# Patient Record
Sex: MALE | Race: WHITE | Employment: UNEMPLOYED | URBAN - METROPOLITAN AREA
[De-identification: names, ages, dates, MRNs, and addresses within clinical notes are randomized per-mention and may not be internally consistent; named-entity substitution may affect disease eponyms.]

---

## 2020-01-22 ENCOUNTER — HOSPITAL ENCOUNTER (INPATIENT)
Age: 75
LOS: 12 days | Discharge: HOME HEALTH | DRG: 233 | End: 2020-02-03
Attending: INTERNAL MEDICINE | Admitting: THORACIC SURGERY (CARDIOTHORACIC VASCULAR SURGERY)
Payer: MEDICARE

## 2020-01-22 ENCOUNTER — HOSPITAL ENCOUNTER (EMERGENCY)
Dept: HOSPITAL 83 - ED | Age: 75
Discharge: ANOTHER HOSPITAL | End: 2020-01-22
Payer: COMMERCIAL

## 2020-01-22 VITALS — WEIGHT: 191 LBS | BODY MASS INDEX: 29.98 KG/M2 | HEIGHT: 66.97 IN

## 2020-01-22 DIAGNOSIS — Z88.7: ICD-10-CM

## 2020-01-22 DIAGNOSIS — I21.9: Primary | ICD-10-CM

## 2020-01-22 DIAGNOSIS — K30: ICD-10-CM

## 2020-01-22 PROBLEM — I25.10 CAD IN NATIVE ARTERY: Status: ACTIVE | Noted: 2020-01-22

## 2020-01-22 LAB
ABO/RH: NORMAL
ALBUMIN SERPL-MCNC: 4.1 GM/DL (ref 3.1–4.5)
ALP SERPL-CCNC: 78 U/L (ref 45–117)
ALT SERPL W P-5'-P-CCNC: 35 U/L (ref 12–78)
ANTIBODY SCREEN: NORMAL
APTT PPP: 24.4 SECONDS (ref 20–32.1)
AST SERPL-CCNC: 35 IU/L (ref 3–35)
BASOPHILS # BLD AUTO: 0.1 10*3/UL (ref 0–0.1)
BASOPHILS NFR BLD AUTO: 0.5 % (ref 0–1)
BUN SERPL-MCNC: 11 MG/DL (ref 7–24)
CHLORIDE SERPL-SCNC: 103 MMOL/L (ref 98–107)
CREAT SERPL-MCNC: 1 MG/DL (ref 0.7–1.3)
EOSINOPHIL # BLD AUTO: 0.1 10*3/UL (ref 0–0.4)
EOSINOPHIL # BLD AUTO: 0.5 % (ref 1–4)
ERYTHROCYTE [DISTWIDTH] IN BLOOD BY AUTOMATED COUNT: 12.9 % (ref 0–14.5)
HCT VFR BLD AUTO: 56.5 % (ref 42–52)
HGB BLD-MCNC: 18.6 G/DL (ref 14–18)
INR BLD: 0.9 (ref 2–3.5)
LV EF: 20 %
LVEF MODALITY: NORMAL
LYMPHOCYTES # BLD AUTO: 1.7 10*3/UL (ref 1.3–4.4)
LYMPHOCYTES NFR BLD AUTO: 9.6 % (ref 27–41)
MCH RBC QN AUTO: 29 PG (ref 27–31)
MCHC RBC AUTO-ENTMCNC: 32.9 G/DL (ref 33–37)
MCV RBC AUTO: 88.1 FL (ref 80–94)
MONOCYTES # BLD AUTO: 0.8 10*3/UL (ref 0.1–1)
MONOCYTES NFR BLD MANUAL: 4.3 % (ref 3–9)
NEUT #: 14.9 10*3/UL (ref 2.3–7.9)
NEUT %: 84.4 % (ref 47–73)
NRBC BLD QL AUTO: 0 % (ref 0–0)
PLATELET # BLD AUTO: 363 10*3/UL (ref 130–400)
PMV BLD AUTO: 10.6 FL (ref 9.6–12.3)
POTASSIUM SERPL-SCNC: 4.6 MMOL/L (ref 3.5–5.1)
PROT SERPL-MCNC: 7.9 GM/DL (ref 6.4–8.2)
RBC # BLD AUTO: 6.41 10*6/UL (ref 4.5–5.9)
SODIUM SERPL-SCNC: 138 MMOL/L (ref 136–145)
TROPONIN I SERPL-MCNC: 1.42 NG/ML (ref ?–0.04)
WBC NRBC COR # BLD AUTO: 17.7 10*3/UL (ref 4.8–10.8)

## 2020-01-22 PROCEDURE — 2500000003 HC RX 250 WO HCPCS: Performed by: INTERNAL MEDICINE

## 2020-01-22 PROCEDURE — 93458 L HRT ARTERY/VENTRICLE ANGIO: CPT | Performed by: INTERNAL MEDICINE

## 2020-01-22 PROCEDURE — 2500000003 HC RX 250 WO HCPCS

## 2020-01-22 PROCEDURE — 33967 INSERT I-AORT PERCUT DEVICE: CPT | Performed by: INTERNAL MEDICINE

## 2020-01-22 PROCEDURE — 2580000003 HC RX 258: Performed by: INTERNAL MEDICINE

## 2020-01-22 PROCEDURE — 86850 RBC ANTIBODY SCREEN: CPT

## 2020-01-22 PROCEDURE — 6360000002 HC RX W HCPCS: Performed by: INTERNAL MEDICINE

## 2020-01-22 PROCEDURE — 4A023N7 MEASUREMENT OF CARDIAC SAMPLING AND PRESSURE, LEFT HEART, PERCUTANEOUS APPROACH: ICD-10-PCS | Performed by: INTERNAL MEDICINE

## 2020-01-22 PROCEDURE — C1894 INTRO/SHEATH, NON-LASER: HCPCS

## 2020-01-22 PROCEDURE — 6370000000 HC RX 637 (ALT 250 FOR IP): Performed by: INTERNAL MEDICINE

## 2020-01-22 PROCEDURE — 86900 BLOOD TYPING SEROLOGIC ABO: CPT

## 2020-01-22 PROCEDURE — 5A02210 ASSISTANCE WITH CARDIAC OUTPUT USING BALLOON PUMP, CONTINUOUS: ICD-10-PCS | Performed by: INTERNAL MEDICINE

## 2020-01-22 PROCEDURE — 99222 1ST HOSP IP/OBS MODERATE 55: CPT | Performed by: INTERNAL MEDICINE

## 2020-01-22 PROCEDURE — C1769 GUIDE WIRE: HCPCS

## 2020-01-22 PROCEDURE — B2151ZZ FLUOROSCOPY OF LEFT HEART USING LOW OSMOLAR CONTRAST: ICD-10-PCS | Performed by: INTERNAL MEDICINE

## 2020-01-22 PROCEDURE — 86923 COMPATIBILITY TEST ELECTRIC: CPT

## 2020-01-22 PROCEDURE — 93005 ELECTROCARDIOGRAM TRACING: CPT | Performed by: INTERNAL MEDICINE

## 2020-01-22 PROCEDURE — 2709999900 HC NON-CHARGEABLE SUPPLY

## 2020-01-22 PROCEDURE — 86901 BLOOD TYPING SEROLOGIC RH(D): CPT

## 2020-01-22 PROCEDURE — 6360000002 HC RX W HCPCS

## 2020-01-22 PROCEDURE — C1725 CATH, TRANSLUMIN NON-LASER: HCPCS

## 2020-01-22 PROCEDURE — 2000000000 HC ICU R&B

## 2020-01-22 PROCEDURE — B2111ZZ FLUOROSCOPY OF MULTIPLE CORONARY ARTERIES USING LOW OSMOLAR CONTRAST: ICD-10-PCS | Performed by: INTERNAL MEDICINE

## 2020-01-22 RX ORDER — SODIUM CHLORIDE 9 MG/ML
INJECTION, SOLUTION INTRAVENOUS CONTINUOUS
Status: DISCONTINUED | OUTPATIENT
Start: 2020-01-22 | End: 2020-01-22

## 2020-01-22 RX ORDER — ACETAMINOPHEN 325 MG/1
650 TABLET ORAL EVERY 4 HOURS PRN
Status: DISCONTINUED | OUTPATIENT
Start: 2020-01-22 | End: 2020-01-22 | Stop reason: SDUPTHER

## 2020-01-22 RX ORDER — NITROGLYCERIN 20 MG/100ML
10 INJECTION INTRAVENOUS CONTINUOUS
Status: DISCONTINUED | OUTPATIENT
Start: 2020-01-22 | End: 2020-01-22

## 2020-01-22 RX ORDER — ZOLPIDEM TARTRATE 5 MG/1
5 TABLET ORAL NIGHTLY PRN
Status: DISCONTINUED | OUTPATIENT
Start: 2020-01-22 | End: 2020-01-24

## 2020-01-22 RX ORDER — CALCIUM CARBONATE 200(500)MG
500 TABLET,CHEWABLE ORAL 3 TIMES DAILY PRN
Status: DISCONTINUED | OUTPATIENT
Start: 2020-01-22 | End: 2020-01-24

## 2020-01-22 RX ORDER — ATORVASTATIN CALCIUM 40 MG/1
80 TABLET, FILM COATED ORAL NIGHTLY
Status: DISCONTINUED | OUTPATIENT
Start: 2020-01-22 | End: 2020-02-03 | Stop reason: HOSPADM

## 2020-01-22 RX ORDER — ONDANSETRON 2 MG/ML
4 INJECTION INTRAMUSCULAR; INTRAVENOUS EVERY 6 HOURS PRN
Status: DISCONTINUED | OUTPATIENT
Start: 2020-01-22 | End: 2020-01-24

## 2020-01-22 RX ORDER — FUROSEMIDE 10 MG/ML
20 INJECTION INTRAMUSCULAR; INTRAVENOUS ONCE
Status: COMPLETED | OUTPATIENT
Start: 2020-01-22 | End: 2020-01-22

## 2020-01-22 RX ORDER — CARVEDILOL 6.25 MG/1
6.25 TABLET ORAL 2 TIMES DAILY WITH MEALS
Status: DISCONTINUED | OUTPATIENT
Start: 2020-01-22 | End: 2020-01-24

## 2020-01-22 RX ORDER — SODIUM CHLORIDE 0.9 % (FLUSH) 0.9 %
10 SYRINGE (ML) INJECTION PRN
Status: DISCONTINUED | OUTPATIENT
Start: 2020-01-22 | End: 2020-01-23 | Stop reason: SDUPTHER

## 2020-01-22 RX ORDER — SODIUM CHLORIDE 0.9 % (FLUSH) 0.9 %
10 SYRINGE (ML) INJECTION EVERY 12 HOURS SCHEDULED
Status: DISCONTINUED | OUTPATIENT
Start: 2020-01-22 | End: 2020-01-23 | Stop reason: SDUPTHER

## 2020-01-22 RX ORDER — PANTOPRAZOLE SODIUM 40 MG/1
40 TABLET, DELAYED RELEASE ORAL
Status: DISCONTINUED | OUTPATIENT
Start: 2020-01-23 | End: 2020-01-24

## 2020-01-22 RX ORDER — ACETAMINOPHEN 325 MG/1
650 TABLET ORAL EVERY 4 HOURS PRN
Status: DISCONTINUED | OUTPATIENT
Start: 2020-01-22 | End: 2020-01-24

## 2020-01-22 RX ORDER — ASPIRIN 81 MG/1
81 TABLET ORAL DAILY
Status: DISCONTINUED | OUTPATIENT
Start: 2020-01-22 | End: 2020-01-24

## 2020-01-22 RX ORDER — CHOLECALCIFEROL (VITAMIN D3) 125 MCG
500 CAPSULE ORAL DAILY
COMMUNITY

## 2020-01-22 RX ORDER — SPIRONOLACTONE 25 MG/1
25 TABLET ORAL DAILY
Status: DISCONTINUED | OUTPATIENT
Start: 2020-01-22 | End: 2020-01-24

## 2020-01-22 RX ORDER — MULTIVIT-MIN/IRON/FOLIC ACID/K 18-600-40
CAPSULE ORAL
COMMUNITY

## 2020-01-22 RX ADMIN — ATORVASTATIN CALCIUM 80 MG: 40 TABLET, FILM COATED ORAL at 21:39

## 2020-01-22 RX ADMIN — FUROSEMIDE 20 MG: 20 INJECTION, SOLUTION INTRAMUSCULAR; INTRAVENOUS at 21:40

## 2020-01-22 RX ADMIN — SODIUM CHLORIDE: 9 INJECTION, SOLUTION INTRAVENOUS at 12:47

## 2020-01-22 RX ADMIN — CARVEDILOL 6.25 MG: 6.25 TABLET, FILM COATED ORAL at 21:41

## 2020-01-22 RX ADMIN — SPIRONOLACTONE 25 MG: 25 TABLET ORAL at 19:22

## 2020-01-22 RX ADMIN — SODIUM CHLORIDE: 9 INJECTION, SOLUTION INTRAVENOUS at 12:48

## 2020-01-22 RX ADMIN — Medication 10 ML: at 21:40

## 2020-01-22 RX ADMIN — NITROGLYCERIN 10 MCG/MIN: 20 INJECTION INTRAVENOUS at 18:30

## 2020-01-22 RX ADMIN — ASPIRIN 81 MG: 81 TABLET, COATED ORAL at 21:41

## 2020-01-22 ASSESSMENT — PAIN SCALES - GENERAL: PAINLEVEL_OUTOF10: 0

## 2020-01-22 NOTE — H&P
then the rest of the mid segment of the   RCA is mildly diffusely diseased  5. Left ventriculography showed estimated ejection fraction of   20%, with apical dyskinesis, global wall hypokinesis, plus for   angiographic mitral valve regurgitation with enlarged left atrium  6. LVEDP of 27 and there was no gradient across aortic valve on   pullback. He is now being evaluated for cardiac bypass grafting  Past Medical History:          Diagnosis Date    CAD in native artery 1/22/2020    Hernia, umbilical     MI ([facility] infarction) [facility])        Past Surgical History:          Procedure Laterality Date    HERNIA REPAIR         Medications Prior to Admission:      Prior to Admission medications    Medication Sig Start Date End Date Taking? Authorizing Provider   vitamin B-12 (CYANOCOBALAMIN) 500 MCG tablet Take 500 mcg by mouth daily   Yes Historical Provider, MD   Cholecalciferol (VITAMIN D) 50 MCG (2000 UT) CAPS capsule Take by mouth   Yes Historical Provider, MD       Allergies:  Tetanus toxoids    Social History:      The patient currently lives with his wife and [facility]    TOBACCO:   reports that he quit smoking about 15 years ago. His smokeless tobacco use includes chew. ETOH:   has no history on file for alcohol. Family History:      Reviewed in detail and negative for DM, CAD, Cancer, CVA. Positive as follows:    History reviewed. No pertinent family history. REVIEW OF SYSTEMS:   Pertinent positives as noted in the HPI. All other systems reviewed and negative. PHYSICAL EXAM:    /77   Pulse 85   Temp 97.7 °F (36.5 °C) (Temporal)   Resp 20   Ht 5' 7\" (1.702 m)   Wt 173 lb (78.5 kg)   SpO2 98%   BMI 27.10 kg/m²     General appearance:  No apparent distress, appears stated age and cooperative. HEENT:  Normal cephalic, atraumatic without obvious deformity. Pupils equal, round, and reactive to light. Extra ocular muscles intact. Conjunctivae/corneas clear.   Neck: Supple, with full

## 2020-01-22 NOTE — PROGRESS NOTES
SOPHIA Suero Notified of CTS consult via perfect serve. Joi Ponce notified of admisstion via perfect serve.

## 2020-01-22 NOTE — PLAN OF CARE
Problem: Risk for Impaired Skin Integrity  Goal: Tissue integrity - skin and mucous membranes  Description  Structural intactness and normal physiological function of skin and  mucous membranes.   Outcome: Met This Shift  Note:   Reposition pt as oumou pt in reverse trendelenberg iabp intact to right groin     Problem: Cardiac Output - Decreased:  Goal: Hemodynamic stability will improve  Description  Hemodynamic stability will improve  Outcome: Met This Shift  Note:   Monitor vital sgns and monitor chest pain monitor iabp adm medications as ordered

## 2020-01-22 NOTE — PROCEDURES
PROVIDER:     Stacy Villa MD     DATE OF PROCEDURE:    1/22/2020     PROCEDURES PERFORMED:  1. Coronary angiography. 2.   Left heart catheterization  3. Left ventriculography  4. Insertion of intra-aortic balloon pump  5. Conscious sedation using Versed and fentanyl. INDICATION:    1.  Non-ST elevation MI  2. AUC score  for dianostic procedure is 8, AUC indication  for diagnostic procedure is 4    Patient 67years old male no significant past medical history, has been having chest pain described as indigestion for the last few weeks, pain got worse over the last 3 days, last night pain was significant enough that prompted him to seek medical attention, patient was found to have elevated troponin and continued to have chest pain, patient was transferred from [facility] to [facility] for urgent cardiac catheterization. DESCRIPTION OF PROCEDURE:  After the appropriate informed consent, the  right wrist area was prepped and draped in the usual sterile fashion. timeout was completed. Carol Mallet test was normal  The right wrist area was locally anesthetized with 3 mL of 2% lidocaine. A 21-gauge needle was used to access the right  radial artery. 6-Slovak introducer sheath was used to cannulate the right radial artery. 6-Slovak JL3.5 and 6-Slovak JR4 catheters were used for coronary angiography in multiple projections. A 6 Slovak pigtail catheter was used for left heart catheterization and left ventriculography in the CORDOVA 30 projection. At the end of the procedure the catheter and the wire were pulled out from the body, a VASC band was applied to the right wrist area with effective hemostasis.     After completing the coronary angiography and since patient continued to have mild chest pain, even after giving him IV metoprolol and start him on IV nitroglycerin, and due to the severity of his CAD and poor LV function, it was decided to insert intra-aortic balloon pump for regurgitation with enlarged left atrium  6. LVEDP of 27 and there was no gradient across aortic valve on pullback. COMPLICATIONS:  None. ESTIMATED TOTAL BLOOD LOSS:   20-30 mL. MEDICATIONS USED DURING THE PROCEDURE:  We used intraarterial heparin  for anticoagulation for the diagnostic procedure. We used intraarterial verapamil to prevent vasospasm. CONSCIOUS SEDATION:  We used Versed and fentanyl for conscious sedation. First dose was given at  1534. Procedure ended at  1630    . There were  56 minutes of direct face-to-face supervision during conscious sedation  Administration. Total fluoroscopy time was  7  minutes. Total contrast used was  100  mL. IMPRESSION:  1. Severe multivessel disease  2. Severe cardiomyopathy  3. Severe angiographic mitral valve regurgitation (the position of the pigtail catheter might have worsening the severity of the mitral valve regurgitation)  4. Elevated LVEDP  5. Intra-aortic balloon pump insertion for chest pain not responsive to medical therapy,       RECOMMENDATIONS:  1. Continue current treatment  2. CT surgery consultation for evaluation for possible CABG  3.    Echocardiogram  4.   Patient will be transferred to CVICU for further evaluation and management       Nieves Pacheco MD

## 2020-01-22 NOTE — CONSULTS
[facility] Cardiology consult  Dr. Stacy Villa      Reason for Consult: Non-ST elevation MI  Requesting Physician: Dr. Teri Jauregui: Chest pain  History Obtained From: patient  HISTORY OF PRESENT ILLNESS:   Patient is a 67years old male without significant past medical history, [facility] of [facility] pain, patient was found to have non-ST elevation MI with nonspecific T changes in the anterior leads, patient was transferred to [facility] on urgent basis for cardiac catheterization with intention to intervene as warranted. Patient stated for the last couple of weeks he has been having worsening chest pain he described as indigestion, he tried vinegar with some relief, discomfort was exertional, better with rest, associated symptoms included exertional dyspnea, easy fatigability, on further questioning patient stated he has been having these symptoms for the last few months, but they got progressively worse recently, denies any lightheadedness or dizziness, symptoms were significant enough that prompted him to seek medical attention, denies any pedal edema, no PND, no orthopnea, no syncope, no presyncopal episodes.         Past Medical History:   Diagnosis Date    CAD in native artery 1/22/2020    Hernia, umbilical     MI (myocardial infarction) [facility])        Past Surgical History:   Procedure Laterality Date    HERNIA REPAIR           Current Facility-Administered Medications:     0.9 % sodium chloride infusion, , Intravenous, Continuous, Stacy Villa MD, Last Rate: 75 mL/hr at 01/22/20 1800    0.9 % sodium chloride infusion, , Intravenous, Continuous, Stacy Villa MD, Last Rate: 20 mL/hr at 01/22/20 1247    sodium chloride flush 0.9 % injection 10 mL, 10 mL, Intravenous, 2 times per day, Stacy Villa MD    sodium chloride flush 0.9 % injection 10 mL, 10 mL, Intravenous, PRN, Stacy Villa MD    acetaminophen (TYLENOL) tablet 650 mg, 650 mg, Oral, Q4H PRN, Vivien Bush MD    magnesium hydroxide (MILK OF MAGNESIA) 400 MG/5ML suspension 30 mL, 30 mL, Oral, Daily PRN, Vivien Bush MD    zolpidem (AMBIEN) tablet 5 mg, 5 mg, Oral, Nightly PRN, Emely Chavez, DO    [facility]ron [facility] injection 4 mg, 4 mg, Intravenous, Q6H PRN, Emely Chavez, DO    calcium carbonate (TUMS) chewable tablet 500 mg, 500 mg, Oral, TID PRN, Emely Chavez, DO    [START ON 1/23/2020] pantoprazole (PROTONIX) tablet 40 mg, 40 mg, Oral, QAM AC, Emely Chavez, DO    nitroGLYCERIN 50 mg in dextrose 5% 250 mL infusion, 10 mcg/min, Intravenous, Continuous, Fransisco Castañeda MD    Allergies as of 01/22/2020 - Review Complete 01/22/2020   Allergen Reaction Noted    Tetanus toxoids  01/22/2020       Social History     Socioeconomic History    Marital status:      Spouse name: Not on file    Number of children: Not on file    Years of education: Not on file    Highest education level: Not on file   Occupational History    Not on file   Social Needs    Financial resource strain: Not on file    Food insecurity:     Worry: Not on file     Inability: Not on file    Transportation needs:     Medical: Not on file     Non-medical: Not on file   Tobacco Use    Smoking status: Former Smoker     Last attempt to quit: 1/1/2005     Years since quitting: 15.0    Smokeless tobacco: Current User     Types: Chew   Substance and Sexual Activity    Alcohol use: Not on file    Drug use: Not on file    Sexual activity: Not on file   Lifestyle    Physical activity:     Days per week: Not on file     Minutes per session: Not on file    Stress: Not on file   Relationships    Social connections:     Talks on phone: Not on file     Gets together: Not on file     Attends [employer] service: Not on file     Active member of club or organization: Not on file     Attends meetings of clubs or organizations: Not on file     Relationship status: Not on file    Intimate partner MI versus late presentation for ST elevation MI: Still having chest pain, will need urgent cardiac catheterization, procedure, alternatives, risks, benefits, discussed with him, is willing to proceed. RECOMMENDATIONS:   1. Cardiac catheterization: Indications, procedures, risks and benefits of cardiac catheterization were discussed with the patient with risks including, but not limited to, infection, vessel damage, bleeding, dye allergy, nephrotoxicity, dysrhythmia, MI, CVA and death as well as the potential need for emergent cardiac surgery. Patient verbalized understanding and is agreeable to proceed. 2.  Further cardiac recommendations will be forthcoming pending his clinical course and diagnostic test findings    I have reviewed my findings and recommendations with patient and his family at bedside    Thank you for the consult  Electronically signed by Rubin Jo MD on 1/22/2020 at 6:30 PM  NOTE: This report was transcribed using voice recognition software.  Every effort was made to ensure accuracy; however, inadvertent computerized transcription errors may be present

## 2020-01-23 ENCOUNTER — APPOINTMENT (OUTPATIENT)
Dept: CT IMAGING | Age: 75
DRG: 233 | End: 2020-01-23
Attending: INTERNAL MEDICINE
Payer: MEDICARE

## 2020-01-23 ENCOUNTER — APPOINTMENT (OUTPATIENT)
Dept: NON INVASIVE DIAGNOSTICS | Age: 75
DRG: 233 | End: 2020-01-23
Attending: INTERNAL MEDICINE
Payer: MEDICARE

## 2020-01-23 ENCOUNTER — ANESTHESIA (OUTPATIENT)
Dept: NON INVASIVE DIAGNOSTICS | Age: 75
DRG: 233 | End: 2020-01-23
Payer: MEDICARE

## 2020-01-23 ENCOUNTER — ANESTHESIA EVENT (OUTPATIENT)
Dept: OPERATING ROOM | Age: 75
DRG: 233 | End: 2020-01-23
Payer: MEDICARE

## 2020-01-23 ENCOUNTER — APPOINTMENT (OUTPATIENT)
Dept: INTERVENTIONAL RADIOLOGY/VASCULAR | Age: 75
DRG: 233 | End: 2020-01-23
Attending: INTERNAL MEDICINE
Payer: MEDICARE

## 2020-01-23 ENCOUNTER — APPOINTMENT (OUTPATIENT)
Dept: GENERAL RADIOLOGY | Age: 75
DRG: 233 | End: 2020-01-23
Attending: INTERNAL MEDICINE
Payer: MEDICARE

## 2020-01-23 ENCOUNTER — APPOINTMENT (OUTPATIENT)
Dept: ULTRASOUND IMAGING | Age: 75
DRG: 233 | End: 2020-01-23
Attending: INTERNAL MEDICINE
Payer: MEDICARE

## 2020-01-23 ENCOUNTER — ANESTHESIA EVENT (OUTPATIENT)
Dept: NON INVASIVE DIAGNOSTICS | Age: 75
DRG: 233 | End: 2020-01-23
Payer: MEDICARE

## 2020-01-23 VITALS
SYSTOLIC BLOOD PRESSURE: 120 MMHG | OXYGEN SATURATION: 96 % | DIASTOLIC BLOOD PRESSURE: 69 MMHG | RESPIRATION RATE: 19 BRPM

## 2020-01-23 LAB
ALBUMIN SERPL-MCNC: 3.5 G/DL (ref 3.5–5.2)
ALP BLD-CCNC: 63 U/L (ref 40–129)
ALT SERPL-CCNC: 62 U/L (ref 0–40)
ANION GAP SERPL CALCULATED.3IONS-SCNC: 16 MMOL/L (ref 7–16)
APTT: 30.2 SEC (ref 24.5–35.1)
AST SERPL-CCNC: 232 U/L (ref 0–39)
BACTERIA: NORMAL /HPF
BILIRUB SERPL-MCNC: 0.6 MG/DL (ref 0–1.2)
BILIRUBIN URINE: NEGATIVE
BLOOD, URINE: ABNORMAL
BUN BLDV-MCNC: 17 MG/DL (ref 8–23)
CALCIUM SERPL-MCNC: 8.5 MG/DL (ref 8.6–10.2)
CHLORIDE BLD-SCNC: 99 MMOL/L (ref 98–107)
CLARITY: CLEAR
CO2: 21 MMOL/L (ref 22–29)
COLOR: YELLOW
CREAT SERPL-MCNC: 1 MG/DL (ref 0.7–1.2)
EKG ATRIAL RATE: 80 BPM
EKG P AXIS: 72 DEGREES
EKG P-R INTERVAL: 144 MS
EKG Q-T INTERVAL: 404 MS
EKG QRS DURATION: 84 MS
EKG QTC CALCULATION (BAZETT): 465 MS
EKG R AXIS: -9 DEGREES
EKG T AXIS: -23 DEGREES
EKG VENTRICULAR RATE: 80 BPM
GFR AFRICAN AMERICAN: >60
GFR NON-AFRICAN AMERICAN: >60 ML/MIN/1.73
GLUCOSE BLD-MCNC: 141 MG/DL (ref 74–99)
GLUCOSE URINE: NEGATIVE MG/DL
HBA1C MFR BLD: 5.9 % (ref 4–5.6)
HCT VFR BLD CALC: 46.1 % (ref 37–54)
HEMOGLOBIN: 15.2 G/DL (ref 12.5–16.5)
INR BLD: 1.1
KETONES, URINE: NEGATIVE MG/DL
LEUKOCYTE ESTERASE, URINE: ABNORMAL
LV EF: 43 %
LVEF MODALITY: NORMAL
MAGNESIUM: 2 MG/DL (ref 1.6–2.6)
MCH RBC QN AUTO: 28.6 PG (ref 26–35)
MCHC RBC AUTO-ENTMCNC: 33 % (ref 32–34.5)
MCV RBC AUTO: 86.8 FL (ref 80–99.9)
NITRITE, URINE: NEGATIVE
PDW BLD-RTO: 13.5 FL (ref 11.5–15)
PH UA: 5.5 (ref 5–9)
PLATELET # BLD: 244 E9/L (ref 130–450)
PMV BLD AUTO: 10.6 FL (ref 7–12)
POTASSIUM REFLEX MAGNESIUM: 4.6 MMOL/L (ref 3.5–5)
POTASSIUM SERPL-SCNC: 4.6 MMOL/L (ref 3.5–5)
PROTEIN UA: NEGATIVE MG/DL
PROTHROMBIN TIME: 12 SEC (ref 9.3–12.4)
RBC # BLD: 5.31 E12/L (ref 3.8–5.8)
RBC UA: NORMAL /HPF (ref 0–2)
SODIUM BLD-SCNC: 136 MMOL/L (ref 132–146)
SPECIFIC GRAVITY UA: <=1.005 (ref 1–1.03)
TOTAL PROTEIN: 6.1 G/DL (ref 6.4–8.3)
TROPONIN: 6.45 NG/ML (ref 0–0.03)
UROBILINOGEN, URINE: 0.2 E.U./DL
WBC # BLD: 13.4 E9/L (ref 4.5–11.5)
WBC UA: NORMAL /HPF (ref 0–5)

## 2020-01-23 PROCEDURE — 36415 COLL VENOUS BLD VENIPUNCTURE: CPT

## 2020-01-23 PROCEDURE — 80053 COMPREHEN METABOLIC PANEL: CPT

## 2020-01-23 PROCEDURE — 85027 COMPLETE CBC AUTOMATED: CPT

## 2020-01-23 PROCEDURE — 93312 ECHO TRANSESOPHAGEAL: CPT | Performed by: INTERNAL MEDICINE

## 2020-01-23 PROCEDURE — 93321 DOPPLER ECHO F-UP/LMTD STD: CPT

## 2020-01-23 PROCEDURE — 84484 ASSAY OF TROPONIN QUANT: CPT

## 2020-01-23 PROCEDURE — 3700000001 HC ADD 15 MINUTES (ANESTHESIA)

## 2020-01-23 PROCEDURE — 93010 ELECTROCARDIOGRAM REPORT: CPT | Performed by: INTERNAL MEDICINE

## 2020-01-23 PROCEDURE — 3700000000 HC ANESTHESIA ATTENDED CARE

## 2020-01-23 PROCEDURE — 6370000000 HC RX 637 (ALT 250 FOR IP): Performed by: HOSPITALIST

## 2020-01-23 PROCEDURE — 93325 DOPPLER ECHO COLOR FLOW MAPG: CPT

## 2020-01-23 PROCEDURE — 2580000003 HC RX 258: Performed by: INTERNAL MEDICINE

## 2020-01-23 PROCEDURE — 99291 CRITICAL CARE FIRST HOUR: CPT | Performed by: INTERNAL MEDICINE

## 2020-01-23 PROCEDURE — 85730 THROMBOPLASTIN TIME PARTIAL: CPT

## 2020-01-23 PROCEDURE — 93880 EXTRACRANIAL BILAT STUDY: CPT

## 2020-01-23 PROCEDURE — 93325 DOPPLER ECHO COLOR FLOW MAPG: CPT | Performed by: INTERNAL MEDICINE

## 2020-01-23 PROCEDURE — 85610 PROTHROMBIN TIME: CPT

## 2020-01-23 PROCEDURE — 71045 X-RAY EXAM CHEST 1 VIEW: CPT

## 2020-01-23 PROCEDURE — 87088 URINE BACTERIA CULTURE: CPT

## 2020-01-23 PROCEDURE — 2000000000 HC ICU R&B

## 2020-01-23 PROCEDURE — 6360000002 HC RX W HCPCS: Performed by: NURSE ANESTHETIST, CERTIFIED REGISTERED

## 2020-01-23 PROCEDURE — 36591 DRAW BLOOD OFF VENOUS DEVICE: CPT

## 2020-01-23 PROCEDURE — 71250 CT THORAX DX C-: CPT

## 2020-01-23 PROCEDURE — 6370000000 HC RX 637 (ALT 250 FOR IP): Performed by: DENTIST

## 2020-01-23 PROCEDURE — 2580000003 HC RX 258: Performed by: NURSE ANESTHETIST, CERTIFIED REGISTERED

## 2020-01-23 PROCEDURE — 6370000000 HC RX 637 (ALT 250 FOR IP): Performed by: INTERNAL MEDICINE

## 2020-01-23 PROCEDURE — 93312 ECHO TRANSESOPHAGEAL: CPT

## 2020-01-23 PROCEDURE — 93922 UPR/L XTREMITY ART 2 LEVELS: CPT

## 2020-01-23 PROCEDURE — 83735 ASSAY OF MAGNESIUM: CPT

## 2020-01-23 PROCEDURE — 6370000000 HC RX 637 (ALT 250 FOR IP): Performed by: PHYSICIAN ASSISTANT

## 2020-01-23 PROCEDURE — 99222 1ST HOSP IP/OBS MODERATE 55: CPT | Performed by: THORACIC SURGERY (CARDIOTHORACIC VASCULAR SURGERY)

## 2020-01-23 PROCEDURE — 80048 BASIC METABOLIC PNL TOTAL CA: CPT

## 2020-01-23 PROCEDURE — 2580000003 HC RX 258: Performed by: PHYSICIAN ASSISTANT

## 2020-01-23 PROCEDURE — 83036 HEMOGLOBIN GLYCOSYLATED A1C: CPT

## 2020-01-23 PROCEDURE — 81001 URINALYSIS AUTO W/SCOPE: CPT

## 2020-01-23 PROCEDURE — 93970 EXTREMITY STUDY: CPT

## 2020-01-23 RX ORDER — CEFAZOLIN SODIUM 2 G/50ML
2 SOLUTION INTRAVENOUS
Status: DISCONTINUED | OUTPATIENT
Start: 2020-01-24 | End: 2020-01-24 | Stop reason: HOSPADM

## 2020-01-23 RX ORDER — CHLORHEXIDINE GLUCONATE 0.12 MG/ML
15 RINSE ORAL 2 TIMES DAILY
Status: DISCONTINUED | OUTPATIENT
Start: 2020-01-23 | End: 2020-01-24

## 2020-01-23 RX ORDER — CHLORHEXIDINE GLUCONATE 4 G/100ML
SOLUTION TOPICAL SEE ADMIN INSTRUCTIONS
Status: DISCONTINUED | OUTPATIENT
Start: 2020-01-23 | End: 2020-01-24 | Stop reason: HOSPADM

## 2020-01-23 RX ORDER — SODIUM CHLORIDE 0.9 % (FLUSH) 0.9 %
10 SYRINGE (ML) INJECTION PRN
Status: DISCONTINUED | OUTPATIENT
Start: 2020-01-23 | End: 2020-01-24

## 2020-01-23 RX ORDER — PROPOFOL 10 MG/ML
INJECTION, EMULSION INTRAVENOUS PRN
Status: DISCONTINUED | OUTPATIENT
Start: 2020-01-23 | End: 2020-01-23 | Stop reason: SDUPTHER

## 2020-01-23 RX ORDER — CHLORHEXIDINE GLUCONATE 0.12 MG/ML
15 RINSE ORAL ONCE
Status: COMPLETED | OUTPATIENT
Start: 2020-01-24 | End: 2020-01-24

## 2020-01-23 RX ORDER — SODIUM CHLORIDE 9 MG/ML
INJECTION, SOLUTION INTRAVENOUS CONTINUOUS PRN
Status: DISCONTINUED | OUTPATIENT
Start: 2020-01-23 | End: 2020-01-23 | Stop reason: SDUPTHER

## 2020-01-23 RX ORDER — SODIUM CHLORIDE 0.9 % (FLUSH) 0.9 %
10 SYRINGE (ML) INJECTION EVERY 12 HOURS SCHEDULED
Status: DISCONTINUED | OUTPATIENT
Start: 2020-01-23 | End: 2020-01-24

## 2020-01-23 RX ORDER — LANOLIN ALCOHOL/MO/W.PET/CERES
500 CREAM (GRAM) TOPICAL DAILY
Status: DISCONTINUED | OUTPATIENT
Start: 2020-01-23 | End: 2020-01-24

## 2020-01-23 RX ADMIN — PANTOPRAZOLE SODIUM 40 MG: 40 TABLET, DELAYED RELEASE ORAL at 06:45

## 2020-01-23 RX ADMIN — MUPIROCIN: 20 OINTMENT TOPICAL at 21:19

## 2020-01-23 RX ADMIN — Medication 10 ML: at 08:49

## 2020-01-23 RX ADMIN — CARVEDILOL 6.25 MG: 6.25 TABLET, FILM COATED ORAL at 17:21

## 2020-01-23 RX ADMIN — ZOLPIDEM TARTRATE 5 MG: 5 TABLET ORAL at 22:11

## 2020-01-23 RX ADMIN — CARVEDILOL 6.25 MG: 6.25 TABLET, FILM COATED ORAL at 07:48

## 2020-01-23 RX ADMIN — SODIUM CHLORIDE: 9 INJECTION, SOLUTION INTRAVENOUS at 15:57

## 2020-01-23 RX ADMIN — CHLORHEXIDINE GLUCONATE 0.12% ORAL RINSE 15 ML: 1.2 LIQUID ORAL at 21:18

## 2020-01-23 RX ADMIN — ATORVASTATIN CALCIUM 80 MG: 40 TABLET, FILM COATED ORAL at 21:18

## 2020-01-23 RX ADMIN — Medication 500 MCG: at 17:22

## 2020-01-23 RX ADMIN — SPIRONOLACTONE 25 MG: 25 TABLET ORAL at 08:48

## 2020-01-23 RX ADMIN — PROPOFOL 140 MG: 10 INJECTION, EMULSION INTRAVENOUS at 16:07

## 2020-01-23 RX ADMIN — ASPIRIN 81 MG: 81 TABLET, COATED ORAL at 08:48

## 2020-01-23 RX ADMIN — CHLORHEXIDINE GLUCONATE 0.12% ORAL RINSE 15 ML: 1.2 LIQUID ORAL at 15:13

## 2020-01-23 RX ADMIN — Medication 10 ML: at 21:18

## 2020-01-23 ASSESSMENT — PAIN SCALES - GENERAL
PAINLEVEL_OUTOF10: 0

## 2020-01-23 NOTE — CONSULTS
off to midsize OM branches, the distal left circumflex artery has 80% stenosis at the takeoff of OM branch and then there is another 90% stenosis distally, the first OM branch is totally occluded, the second OM branch is a large vessel with the inferior subbranch has 60 to 70% disease in the midsegment  4. The right coronary artery is a midsize vessel, otherwise normally from the right sinus of Valsalva, there is 80 to 90% lesion in the midsegment, then the rest of the mid segment of the RCA is mildly diffusely diseased  5. Left ventriculography showed estimated ejection fraction of 20%, with apical dyskinesis, global wall hypokinesis, plus for angiographic mitral valve regurgitation with enlarged left atrium  6. LVEDP of 27 and there was no gradient across aortic valve on pullback. Assessment/Plan:     I discussed the risks, benefits, and alternatives for surgery including the chance of mortality, and incidence of stroke. We also discussed the risks of infection, bleeding, transfusion risks, postoperative arrhythmias, and postoperative recovery. The patient understood the risks and agrees to proceed with surgery. Severe MVCAD, EF 20%, MR on LV gram.  He will need preop studies prior to surgery including dental clearance, vein mapping, carotid US, and KAMI to quantify LV function and MR to determine MVR/CABG vs PCI +/- MitraClip.

## 2020-01-23 NOTE — CONSULTS
ConsultationNote    Patient's Name/Date of Birth: Kushal Lee / 1945 (58 y.o.)    Date: January 23, 2020     Reason for Consult: Heart valve clearance    Requesting Physician:  Inocencio Kelly MD    HPI  Read & reviewed    Past Medical History:   Diagnosis Date    CAD in native artery 1/22/2020    Hernia, umbilical     [facility]) [facility])        Past Surgical History:   Procedure Laterality Date    HERNIA REPAIR         Prior to Admission medications    Medication Sig Start Date End Date Taking? Authorizing Provider   vitamin B-12 (CYANOCOBALAMIN) 500 MCG tablet Take 500 mcg by mouth daily   Yes Historical Provider, MD   Cholecalciferol (VITAMIN D) 50 MCG (2000 UT) CAPS capsule Take by mouth   Yes Historical Provider, MD       Allergies   Allergen Reactions    Tetanus Toxoids      Flu like symptoms       History reviewed. No pertinent family history. Vitals:    01/23/20 0900 01/23/20 1000 01/23/20 1100 01/23/20 1200   BP: 118/64 108/77 (!) 106/45 (!) 108/42   Pulse: 85 84 81 81   Resp: 22 20 23 15   Temp:    98.5 °F (36.9 °C)   TempSrc:    Temporal   SpO2: 95% 93% 93% 92%   Weight:       Height:         Physical Exam  Intraoral exam: severe attrition of remaining dentition and amalgam staining present on the the occlusal surfaces of previously treated teeth. Extraoral exam: flush and diaphoretic appearance. Assessment:  Active Problems:    CAD in native artery    Non-ST elevation MI (NSTEMI) ([facility])    Chest pain  Resolved Problems:    * No resolved hospital problems. *      Plan:  1. Oral exam:  1. Intraoral exam: severe attrition of remaining dentition and amalgam staining present on the the occlusal surfaces of previously treated teeth. Xray taken which shows no active infection. 2. Extraoral exam: flush and diaphoretic appearance. 2. Consult with Dr. Demetrius Monterroso.  Dr. Demetrius Monterroso reports that, although entire dentition is worn, there are no current signs of infection and advises that the patient is cleared for surgery from a dental standpoint. It is strongly advised that the patient seek dental treatment once health has improved for further evaluation and necessary extractions. 3. Patient requires meticulous oral hygiene including the use of chlorhexidine gluconate 0.12%. 4. Prescribed medication:  1. Chlorhexidine gluconate 0.12%, Disp 1 bottle, Apply to toothbrush and brush for 2 minutes. TID. Do not eat drink or rinse for 30 minutes. 5. Please feel free to contact the dental clinic with any further questions or concerns at [phone]      Electronically signed by Demetria Roa DMD on 1/23/20 at 12:58 PM    I personally evaluated the patient with the above resident and agree with the assessment and proposed treatment plan.   Electronically signed by Luisito Monique DDS on 1/23/2020 at 1:23 PM

## 2020-01-23 NOTE — FLOWSHEET NOTE
Hgb A1C drawn earlier at 1015 after multiple calls to the lab about result lab mo[facility] admits they have lost specimen will redraw after pt has KAMI

## 2020-01-23 NOTE — PLAN OF CARE
Problem: Risk for Impaired Skin Integrity  Goal: Tissue integrity - skin and mucous membranes  1/23/2020 0023 by Mae Leyva RN  Outcome: Met This Shift  1/22/2020 1831 by Cecilia Schulz RN  Outcome: Met This Shift  Note:   Reposition pt as oumou pt in reverse trendelenberg iabp intact to right groin     Problem: Cardiac Output - Decreased:  Goal: Hemodynamic stability will improve  1/23/2020 0023 by Mae Leyva RN  Outcome: Met This Shift  1/22/2020 1831 by Cecilia Schulz RN  Outcome: Met This Shift  Note:   Monitor vital sgns and monitor chest pain monitor iabp adm medications as ordered

## 2020-01-23 NOTE — FLOWSHEET NOTE
Dr Faisal Chakraborty  To do KAMI at 1600 today Anesthesia notified of KAMI at 1600 in room 3819, pt remains npo.

## 2020-01-23 NOTE — CARE COORDINATION
Met with wife and daughter in room, pt asleep. Per wife, pt follows with a Dr at the [facility]. (? Dr Kelley Giordano sp.)  [facility] and was in turn sent here for cardiac w/u. Notified [facility] of admission. Wife states that tentative plan is for cabg in am. [place] with a full set of straight stairs to the 2nd floor. Bathroom is on 1st floor, but pt will likely need a hosp bed or recliner to sleep in when discharged. Daughter inquired if the [place] would cover either bed or recliner. Awaiting return call from the [place] case mger. Will ask about coverage for bed or recliner. Provided wife with hhc and Ynes Solorzano to review. Will await post op therapy evals to determine needs.

## 2020-01-23 NOTE — PLAN OF CARE
Problem: Cardiac:  Goal: Ability to maintain an adequate cardiac output will improve  Description  Ability to maintain an adequate cardiac output will improve  Outcome: Met This Shift  Note:   IABP set to 1:1. Helps perfuse myocardial tissue. Maintains adequate cardiac output     Problem: Coping:  Goal: Ability to verbalize feelings about condition will improve  Description  Ability to verbalize feelings about condition will improve  Outcome: Ongoing  Note:   Pt does not wish to have CABG surgery. Dr Kiley Smart will speak with pt and family today     Problem: Coping:  Goal: Ability to identify strategies to decrease anxiety will improve  Description  Ability to identify strategies to decrease anxiety will improve  Outcome: Ongoing  Note:   Pt anxious about having CABG surgery.  Dr Kiley Smart will speak to pt and family today

## 2020-01-23 NOTE — ANESTHESIA PRE PROCEDURE
Department of Anesthesiology  Preprocedure Note       Name:  Yusuf Amos   Age:  67 y.o.  :  1945                                          MRN:  [id number]         Date:  2020      Surgeon: DR. Jasbir Grey  Procedure: KAMI (BEDSIDE)    Medications prior to admission:   Prior to Admission medications    Medication Sig Start Date End Date Taking? Authorizing Provider   vitamin B-12 (CYANOCOBALAMIN) 500 MCG tablet Take 500 mcg by mouth daily    Historical Provider, MD   Cholecalciferol (VITAMIN D) 50 MCG (2000) CAPS capsule Take by mouth    Historical Provider, MD       Current medications:    No current facility-administered medications for this visit. No current outpatient medications on file.      Facility-Administered Medications Ordered in Other Visits   Medication Dose Route Frequency Provider Last Rate Last Dose    vitamin B-12 (CYANOCOBALAMIN) tablet 500 mcg  500 mcg Oral Daily Flako Valenzuela MD        sodium chloride flush 0.9 % injection 10 mL  10 mL Intravenous 2 times per day Yun Cummins PA-C        sodium chloride flush 0.9 % injection 10 mL  10 mL Intravenous PRN Yun Cummins PA-C        chlorhexidine (PERIDEX) 0.12 % solution 15 mL  15 mL Mouth/Throat BID Mariana Celeste, DMD   15 mL at 20 1513    acetaminophen (TYLENOL) tablet 650 mg  650 mg Oral Q4H PRN Silvia Chavez MD        magnesium hydroxide (MILK OF MAGNESIA) 400 MG/5ML suspension 30 mL  30 mL Oral Daily PRN Silvia Chavez MD        zolpidem (AMBIEN) tablet 5 mg  5 mg Oral Nightly PRN Jose Alberto Kaplan DO        [facility]ansetron [facility]) injection 4 mg  4 mg Intravenous Q6H PRN Jose Alberto Kaplan DO        calcium carbonate (TUMS) chewable tablet 500 mg  500 mg Oral TID PRN Jose Alberto Kaplan DO        pantoprazole (PROTONIX) tablet 40 mg  40 mg Oral QAM AC Jose Alberto Kaplan DO   40 mg at 20 0645    aspirin EC tablet 81 mg  81 mg Oral Daily Silvia Chavez MD   81 mg at 20 0848    atorvastatin  100  mL.       IMPRESSION:  1. Severe multivessel disease  2. Severe cardiomyopathy  3. Severe angiographic mitral valve regurgitation (the position   of the pigtail catheter might have worsening the severity of the   mitral valve regurgitation)  4. Elevated LVEDP  5.  Intra-aortic balloon pump insertion for chest pain not   responsive to medical therapy,       RECOMMENDATIONS:  1.  Continue current treatment  2.    CT surgery consultation for evaluation for possible CABG  3.    Echocardiogram  4.  Patient will be transferred to CVICU for further evaluation   and management    EKG 1/22/20  Component Value Ref Range & Units Status Collected Lab   Ventricular Rate 80  BPM Preliminary 01/22/2020  6:33 PM [facility]   Atrial Rate 80  BPM Preliminary 01/22/2020  6:33 PM [facility]   P-R Interval 144  ms Preliminary 01/22/2020  6:33 PM [facility]   QRS Duration 84  ms Preliminary 01/22/2020  6:33 PM [facility]   Q-T Interval 404  ms Preliminary 01/22/2020  6:33 PM [facility]   QTc Calculation (Bazett) 465  ms Preliminary 01/22/2020  6:33 PM [facility]   P Winnie 72  degrees Preliminary 01/22/2020  6:33 PM [facility]   R Winnie -9  degrees Preliminary 01/22/2020  6:33 PM [facility]   T Winnie -23  degrees Preliminary 01/22/2020  6:33 PM [facility]   Testing Performed By     Lab - Abbreviation Name Director Address Valid Date Range   360-[facility] MUSE Unknown Unknown 04/18/16 0721-Present   Narrative & Impression     Normal sinus rhythm  Low voltage QRS  Septal infarct , age undetermined  T wave abnormality, consider lateral ischemia  Abnormal ECG  No previous ECGs available         NPO Status:                           pt instructed to be NPO at midnight                                                      BMI:   Wt Readings from Last 3 Encounters:   01/22/20 173 lb (78.5 kg)     There is no height or weight on file to calculate BMI.    CBC:   Lab Results   Component Value Date    WBC 13.4 01/23/2020    RBC 5.31 01/23/2020    HGB 15.2 20  PIV L AC 20)  Induction: intravenous. Anesthetic plan and risks discussed with patient. Plan discussed with CRNA.                   Kyle Chilel MD   1/23/2020

## 2020-01-24 ENCOUNTER — ANESTHESIA (OUTPATIENT)
Dept: OPERATING ROOM | Age: 75
DRG: 233 | End: 2020-01-24
Payer: MEDICARE

## 2020-01-24 ENCOUNTER — APPOINTMENT (OUTPATIENT)
Dept: GENERAL RADIOLOGY | Age: 75
DRG: 233 | End: 2020-01-24
Attending: INTERNAL MEDICINE
Payer: MEDICARE

## 2020-01-24 VITALS
SYSTOLIC BLOOD PRESSURE: 135 MMHG | OXYGEN SATURATION: 94 % | RESPIRATION RATE: 12 BRPM | DIASTOLIC BLOOD PRESSURE: 58 MMHG

## 2020-01-24 LAB
AADO2: 205.3 MMHG
AADO2: 330 MMHG
AADO2: 388.2 MMHG
AADO2: 534.1 MMHG
ACTIVATED CLOTTING TIME: 113 SECONDS (ref 99–130)
ACTIVATED CLOTTING TIME: 130 SECONDS (ref 99–130)
ACTIVATED CLOTTING TIME: 493 SECONDS (ref 99–130)
ACTIVATED CLOTTING TIME: 512 SECONDS (ref 99–130)
ACTIVATED CLOTTING TIME: 528 SECONDS (ref 99–130)
ALBUMIN SERPL-MCNC: 3.5 G/DL (ref 3.5–5.2)
ALP BLD-CCNC: 60 U/L (ref 40–129)
ALT SERPL-CCNC: 44 U/L (ref 0–40)
ANION GAP SERPL CALCULATED.3IONS-SCNC: 14 MMOL/L (ref 7–16)
ANION GAP SERPL CALCULATED.3IONS-SCNC: 8 MMOL/L (ref 7–16)
APTT: 32 SEC (ref 24.5–35.1)
AST SERPL-CCNC: 91 U/L (ref 0–39)
B.E.: -2.4 MMOL/L (ref -3–3)
B.E.: -2.6 MMOL/L (ref -3–0)
B.E.: -3 MMOL/L (ref -3–3)
B.E.: -5.3 MMOL/L (ref -3–3)
B.E.: -5.8 MMOL/L (ref -3–3)
B.E.: 0.5 MMOL/L (ref -3–0)
BILIRUB SERPL-MCNC: 0.6 MG/DL (ref 0–1.2)
BUN BLDV-MCNC: 15 MG/DL (ref 8–23)
BUN BLDV-MCNC: 15 MG/DL (ref 8–23)
CALCIUM SERPL-MCNC: 8.5 MG/DL (ref 8.6–10.2)
CALCIUM SERPL-MCNC: 8.8 MG/DL (ref 8.6–10.2)
CARDIOPULMONARY BYPASS: YES
CARDIOPULMONARY BYPASS: YES
CHLORIDE BLD-SCNC: 101 MMOL/L (ref 98–107)
CHLORIDE BLD-SCNC: 102 MMOL/L (ref 98–107)
CO2: 23 MMOL/L (ref 22–29)
CO2: 25 MMOL/L (ref 22–29)
COHB: 0.2 % (ref 0–1.5)
COHB: 0.4 % (ref 0–1.5)
COHB: 0.7 % (ref 0–1.5)
COHB: 1.6 % (ref 0–1.5)
CREAT SERPL-MCNC: 0.8 MG/DL (ref 0.7–1.2)
CREAT SERPL-MCNC: 0.8 MG/DL (ref 0.7–1.2)
CRITICAL: ABNORMAL
DATE ANALYZED: ABNORMAL
DATE OF COLLECTION: ABNORMAL
DEVICE: ABNORMAL
DEVICE: ABNORMAL
FIO2 ARTERIAL: 70
FIO2 ARTERIAL: 70
FIO2: 100 %
FIO2: 50 %
FIO2: 80 %
FIO2: 80 %
GFR AFRICAN AMERICAN: >60
GFR AFRICAN AMERICAN: >60
GFR NON-AFRICAN AMERICAN: >60 ML/MIN/1.73
GFR NON-AFRICAN AMERICAN: >60 ML/MIN/1.73
GLUCOSE BLD-MCNC: 130 MG/DL (ref 74–99)
GLUCOSE BLD-MCNC: 209 MG/DL (ref 74–99)
HCO3 ARTERIAL: 23.2 MMOL/L (ref 22–26)
HCO3 ARTERIAL: 26 MMOL/L (ref 22–26)
HCO3: 21.1 MMOL/L (ref 22–26)
HCO3: 22.4 MMOL/L (ref 22–26)
HCO3: 22.6 MMOL/L (ref 22–26)
HCO3: 23.7 MMOL/L (ref 22–26)
HCT (EST): 30 % (ref 37–54)
HCT (EST): 30 % (ref 37–54)
HCT VFR BLD CALC: 42.8 % (ref 37–54)
HCT VFR BLD CALC: 43 % (ref 37–54)
HEMOGLOBIN: 14 G/DL (ref 12.5–16.5)
HEMOGLOBIN: 14.1 G/DL (ref 12.5–16.5)
HGB, (EST): 10.3 G/DL (ref 12.5–15.5)
HGB, (EST): 10.3 G/DL (ref 12.5–15.5)
HHB: 1.6 % (ref 0–5)
HHB: 2.5 % (ref 0–5)
HHB: 3.4 % (ref 0–5)
HHB: 3.7 % (ref 0–5)
INR BLD: 1.1
LAB: ABNORMAL
MAGNESIUM: 3.2 MG/DL (ref 1.6–2.6)
MCH RBC QN AUTO: 28.6 PG (ref 26–35)
MCH RBC QN AUTO: 28.9 PG (ref 26–35)
MCHC RBC AUTO-ENTMCNC: 32.7 % (ref 32–34.5)
MCHC RBC AUTO-ENTMCNC: 32.8 % (ref 32–34.5)
MCV RBC AUTO: 87.2 FL (ref 80–99.9)
MCV RBC AUTO: 88.2 FL (ref 80–99.9)
METER GLUCOSE: 123 MG/DL (ref 74–99)
METER GLUCOSE: 125 MG/DL (ref 74–99)
METER GLUCOSE: 126 MG/DL (ref 74–99)
METER GLUCOSE: 139 MG/DL (ref 74–99)
METER GLUCOSE: 143 MG/DL (ref 74–99)
METER GLUCOSE: 145 MG/DL (ref 74–99)
METER GLUCOSE: 152 MG/DL (ref 74–99)
METER GLUCOSE: 163 MG/DL (ref 74–99)
METER GLUCOSE: 190 MG/DL (ref 74–99)
METHB: 0.1 % (ref 0–1.5)
METHB: 0.2 % (ref 0–1.5)
METHB: 0.2 % (ref 0–1.5)
METHB: 0.4 % (ref 0–1.5)
MODE: ABNORMAL
MODE: ABNORMAL
MODE: AC
MODE: AC
O2 CONTENT: 18.1 ML/DL
O2 CONTENT: 18.9 ML/DL
O2 CONTENT: 20.1 ML/DL
O2 CONTENT: 20.7 ML/DL
O2 SATURATION: 100 % (ref 92–98.5)
O2 SATURATION: 96.3 % (ref 92–98.5)
O2 SATURATION: 96.5 % (ref 92–98.5)
O2 SATURATION: 97.5 % (ref 92–98.5)
O2 SATURATION: 98.4 % (ref 92–98.5)
O2 SATURATION: 99.9 % (ref 92–98.5)
O2HB: 94.8 % (ref 94–97)
O2HB: 95.7 % (ref 94–97)
O2HB: 96.7 % (ref 94–97)
O2HB: 97.8 % (ref 94–97)
OPERATOR ID: 4510
OPERATOR ID: 4510
OPERATOR ID: 467
OPERATOR ID: 467
OPERATOR ID: ABNORMAL
OPERATOR ID: ABNORMAL
PATIENT TEMP: 37 C
PCO2 ARTERIAL: 43 MMHG (ref 35–45)
PCO2 ARTERIAL: 44.8 MMHG (ref 35–45)
PCO2: 42.3 MMHG (ref 35–45)
PCO2: 46 MMHG (ref 35–45)
PCO2: 46.4 MMHG (ref 35–45)
PCO2: 51.9 MMHG (ref 35–45)
PDW BLD-RTO: 13.3 FL (ref 11.5–15)
PDW BLD-RTO: 13.3 FL (ref 11.5–15)
PEEP/CPAP: 5 CMH2O
PEEP/CPAP: 8 CMH2O
PFO2: 1.02 MMHG/%
PFO2: 1.42 MMHG/%
PFO2: 1.82 MMHG/%
PFO2: 2.15 MMHG/%
PH BLOOD GAS: 7.25 (ref 7.35–7.45)
PH BLOOD GAS: 7.28 (ref 7.35–7.45)
PH BLOOD GAS: 7.33 (ref 7.35–7.45)
PH BLOOD GAS: 7.34 (ref 7.35–7.45)
PH BLOOD GAS: 7.35 (ref 7.35–7.45)
PH BLOOD GAS: 7.37 (ref 7.35–7.45)
PLATELET # BLD: 176 E9/L (ref 130–450)
PLATELET # BLD: 190 E9/L (ref 130–450)
PMV BLD AUTO: 10.5 FL (ref 7–12)
PMV BLD AUTO: 10.8 FL (ref 7–12)
PO2 ARTERIAL: 329 MMHG (ref 60–80)
PO2 ARTERIAL: 410.3 MMHG (ref 60–80)
PO2: 102 MMHG (ref 75–100)
PO2: 113.5 MMHG (ref 75–100)
PO2: 172.1 MMHG (ref 75–100)
PO2: 91.1 MMHG (ref 75–100)
POTASSIUM REFLEX MAGNESIUM: 4.4 MMOL/L (ref 3.5–5)
POTASSIUM SERPL-SCNC: 4.68 MMOL/L (ref 3.5–5)
POTASSIUM SERPL-SCNC: 4.8 MMOL/L (ref 3.5–5)
POTASSIUM SERPL-SCNC: 4.9 MMOL/L (ref 3.5–5)
POTASSIUM SERPL-SCNC: 4.93 MMOL/L (ref 3.5–5)
POTASSIUM SERPL-SCNC: 5.1 MMOL/L (ref 3.5–5.5)
POTASSIUM SERPL-SCNC: 5.5 MMOL/L (ref 3.5–5.5)
PROTHROMBIN TIME: 12.7 SEC (ref 9.3–12.4)
PS: 10 CMH20
PS: 15 CMH20
RBC # BLD: 4.85 E12/L (ref 3.8–5.8)
RBC # BLD: 4.93 E12/L (ref 3.8–5.8)
RI(T): 192 %
RI(T): 225 %
RI(T): 342 %
RI(T): 5.24
RR MECHANICAL: 14 B/MIN
RR MECHANICAL: 14 B/MIN
SODIUM BLD-SCNC: 134 MMOL/L (ref 132–146)
SODIUM BLD-SCNC: 139 MMOL/L (ref 132–146)
SOURCE, BLOOD GAS: ABNORMAL
THB: 13.4 G/DL (ref 11.5–16.5)
THB: 13.5 G/DL (ref 11.5–16.5)
THB: 15 G/DL (ref 11.5–16.5)
THB: 15.1 G/DL (ref 11.5–16.5)
TIME ANALYZED: 1109
TIME ANALYZED: 1221
TIME ANALYZED: 1511
TIME ANALYZED: 1654
TOTAL PROTEIN: 6 G/DL (ref 6.4–8.3)
VT MECHANICAL: 500 ML
VT MECHANICAL: 500 ML
WBC # BLD: 12.1 E9/L (ref 4.5–11.5)
WBC # BLD: 26.7 E9/L (ref 4.5–11.5)

## 2020-01-24 PROCEDURE — 2709999900 HC NON-CHARGEABLE SUPPLY: Performed by: THORACIC SURGERY (CARDIOTHORACIC VASCULAR SURGERY)

## 2020-01-24 PROCEDURE — 71045 X-RAY EXAM CHEST 1 VIEW: CPT

## 2020-01-24 PROCEDURE — 3600000018 HC SURGERY OHS ADDTL 15MIN: Performed by: THORACIC SURGERY (CARDIOTHORACIC VASCULAR SURGERY)

## 2020-01-24 PROCEDURE — 6360000002 HC RX W HCPCS: Performed by: NURSE ANESTHETIST, CERTIFIED REGISTERED

## 2020-01-24 PROCEDURE — 6370000000 HC RX 637 (ALT 250 FOR IP): Performed by: THORACIC SURGERY (CARDIOTHORACIC VASCULAR SURGERY)

## 2020-01-24 PROCEDURE — 82962 GLUCOSE BLOOD TEST: CPT

## 2020-01-24 PROCEDURE — 02100Z9 BYPASS CORONARY ARTERY, ONE ARTERY FROM LEFT INTERNAL MAMMARY, OPEN APPROACH: ICD-10-PCS | Performed by: THORACIC SURGERY (CARDIOTHORACIC VASCULAR SURGERY)

## 2020-01-24 PROCEDURE — C9113 INJ PANTOPRAZOLE SODIUM, VIA: HCPCS | Performed by: THORACIC SURGERY (CARDIOTHORACIC VASCULAR SURGERY)

## 2020-01-24 PROCEDURE — 84132 ASSAY OF SERUM POTASSIUM: CPT

## 2020-01-24 PROCEDURE — 6370000000 HC RX 637 (ALT 250 FOR IP): Performed by: NURSE ANESTHETIST, CERTIFIED REGISTERED

## 2020-01-24 PROCEDURE — 021009W BYPASS CORONARY ARTERY, ONE ARTERY FROM AORTA WITH AUTOLOGOUS VENOUS TISSUE, OPEN APPROACH: ICD-10-PCS | Performed by: THORACIC SURGERY (CARDIOTHORACIC VASCULAR SURGERY)

## 2020-01-24 PROCEDURE — 6360000002 HC RX W HCPCS

## 2020-01-24 PROCEDURE — 2580000003 HC RX 258

## 2020-01-24 PROCEDURE — 94664 DEMO&/EVAL PT USE INHALER: CPT

## 2020-01-24 PROCEDURE — 2720000010 HC SURG SUPPLY STERILE: Performed by: THORACIC SURGERY (CARDIOTHORACIC VASCULAR SURGERY)

## 2020-01-24 PROCEDURE — 82805 BLOOD GASES W/O2 SATURATION: CPT

## 2020-01-24 PROCEDURE — 80048 BASIC METABOLIC PNL TOTAL CA: CPT

## 2020-01-24 PROCEDURE — P9045 ALBUMIN (HUMAN), 5%, 250 ML: HCPCS | Performed by: THORACIC SURGERY (CARDIOTHORACIC VASCULAR SURGERY)

## 2020-01-24 PROCEDURE — 94640 AIRWAY INHALATION TREATMENT: CPT

## 2020-01-24 PROCEDURE — 3600000008 HC SURGERY OHS BASE: Performed by: THORACIC SURGERY (CARDIOTHORACIC VASCULAR SURGERY)

## 2020-01-24 PROCEDURE — 2580000003 HC RX 258: Performed by: NURSE PRACTITIONER

## 2020-01-24 PROCEDURE — 6360000002 HC RX W HCPCS: Performed by: THORACIC SURGERY (CARDIOTHORACIC VASCULAR SURGERY)

## 2020-01-24 PROCEDURE — 36415 COLL VENOUS BLD VENIPUNCTURE: CPT

## 2020-01-24 PROCEDURE — 94002 VENT MGMT INPAT INIT DAY: CPT

## 2020-01-24 PROCEDURE — 36556 INSERT NON-TUNNEL CV CATH: CPT

## 2020-01-24 PROCEDURE — 2700000000 HC OXYGEN THERAPY PER DAY

## 2020-01-24 PROCEDURE — 2580000003 HC RX 258: Performed by: THORACIC SURGERY (CARDIOTHORACIC VASCULAR SURGERY)

## 2020-01-24 PROCEDURE — 2500000003 HC RX 250 WO HCPCS

## 2020-01-24 PROCEDURE — 94660 CPAP INITIATION&MGMT: CPT

## 2020-01-24 PROCEDURE — B246ZZ4 ULTRASONOGRAPHY OF RIGHT AND LEFT HEART, TRANSESOPHAGEAL: ICD-10-PCS | Performed by: THORACIC SURGERY (CARDIOTHORACIC VASCULAR SURGERY)

## 2020-01-24 PROCEDURE — 2580000003 HC RX 258: Performed by: NURSE ANESTHETIST, CERTIFIED REGISTERED

## 2020-01-24 PROCEDURE — 2500000003 HC RX 250 WO HCPCS: Performed by: NURSE ANESTHETIST, CERTIFIED REGISTERED

## 2020-01-24 PROCEDURE — 85027 COMPLETE CBC AUTOMATED: CPT

## 2020-01-24 PROCEDURE — 82803 BLOOD GASES ANY COMBINATION: CPT

## 2020-01-24 PROCEDURE — P9045 ALBUMIN (HUMAN), 5%, 250 ML: HCPCS | Performed by: NURSE PRACTITIONER

## 2020-01-24 PROCEDURE — 7100000000 HC PACU RECOVERY - FIRST 15 MIN

## 2020-01-24 PROCEDURE — 80053 COMPREHEN METABOLIC PANEL: CPT

## 2020-01-24 PROCEDURE — 83735 ASSAY OF MAGNESIUM: CPT

## 2020-01-24 PROCEDURE — 7100000001 HC PACU RECOVERY - ADDTL 15 MIN

## 2020-01-24 PROCEDURE — 2000000000 HC ICU R&B

## 2020-01-24 PROCEDURE — 33517 CABG ARTERY-VEIN SINGLE: CPT | Performed by: THORACIC SURGERY (CARDIOTHORACIC VASCULAR SURGERY)

## 2020-01-24 PROCEDURE — 85730 THROMBOPLASTIN TIME PARTIAL: CPT

## 2020-01-24 PROCEDURE — 85610 PROTHROMBIN TIME: CPT

## 2020-01-24 PROCEDURE — 33533 CABG ARTERIAL SINGLE: CPT | Performed by: THORACIC SURGERY (CARDIOTHORACIC VASCULAR SURGERY)

## 2020-01-24 PROCEDURE — 3700000000 HC ANESTHESIA ATTENDED CARE: Performed by: THORACIC SURGERY (CARDIOTHORACIC VASCULAR SURGERY)

## 2020-01-24 PROCEDURE — 85347 COAGULATION TIME ACTIVATED: CPT

## 2020-01-24 PROCEDURE — 03HY32Z INSERTION OF MONITORING DEVICE INTO UPPER ARTERY, PERCUTANEOUS APPROACH: ICD-10-PCS | Performed by: THORACIC SURGERY (CARDIOTHORACIC VASCULAR SURGERY)

## 2020-01-24 PROCEDURE — 36620 INSERTION CATHETER ARTERY: CPT

## 2020-01-24 PROCEDURE — 5A1221Z PERFORMANCE OF CARDIAC OUTPUT, CONTINUOUS: ICD-10-PCS | Performed by: THORACIC SURGERY (CARDIOTHORACIC VASCULAR SURGERY)

## 2020-01-24 PROCEDURE — 02HV33Z INSERTION OF INFUSION DEVICE INTO SUPERIOR VENA CAVA, PERCUTANEOUS APPROACH: ICD-10-PCS | Performed by: THORACIC SURGERY (CARDIOTHORACIC VASCULAR SURGERY)

## 2020-01-24 PROCEDURE — 3700000001 HC ADD 15 MINUTES (ANESTHESIA): Performed by: THORACIC SURGERY (CARDIOTHORACIC VASCULAR SURGERY)

## 2020-01-24 PROCEDURE — 06BQ4ZZ EXCISION OF LEFT SAPHENOUS VEIN, PERCUTANEOUS ENDOSCOPIC APPROACH: ICD-10-PCS | Performed by: THORACIC SURGERY (CARDIOTHORACIC VASCULAR SURGERY)

## 2020-01-24 PROCEDURE — 33508 ENDOSCOPIC VEIN HARVEST: CPT | Performed by: THORACIC SURGERY (CARDIOTHORACIC VASCULAR SURGERY)

## 2020-01-24 PROCEDURE — 6370000000 HC RX 637 (ALT 250 FOR IP): Performed by: PHYSICIAN ASSISTANT

## 2020-01-24 PROCEDURE — 6360000002 HC RX W HCPCS: Performed by: NURSE PRACTITIONER

## 2020-01-24 RX ORDER — FENTANYL CITRATE 50 UG/ML
25 INJECTION, SOLUTION INTRAMUSCULAR; INTRAVENOUS
Status: DISCONTINUED | OUTPATIENT
Start: 2020-01-24 | End: 2020-01-28

## 2020-01-24 RX ORDER — FENTANYL CITRATE 0.05 MG/ML
INJECTION, SOLUTION INTRAMUSCULAR; INTRAVENOUS PRN
Status: DISCONTINUED | OUTPATIENT
Start: 2020-01-24 | End: 2020-01-24 | Stop reason: SDUPTHER

## 2020-01-24 RX ORDER — ASPIRIN 300 MG/1
300 SUPPOSITORY RECTAL ONCE
Status: COMPLETED | OUTPATIENT
Start: 2020-01-24 | End: 2020-01-24

## 2020-01-24 RX ORDER — SODIUM CHLORIDE 0.9 % (FLUSH) 0.9 %
10 SYRINGE (ML) INJECTION EVERY 12 HOURS SCHEDULED
Status: DISCONTINUED | OUTPATIENT
Start: 2020-01-24 | End: 2020-02-03 | Stop reason: HOSPADM

## 2020-01-24 RX ORDER — OXYCODONE HYDROCHLORIDE 5 MG/1
10 TABLET ORAL EVERY 4 HOURS PRN
Status: DISCONTINUED | OUTPATIENT
Start: 2020-01-24 | End: 2020-01-29

## 2020-01-24 RX ORDER — SODIUM CHLORIDE 0.9 % (FLUSH) 0.9 %
10 SYRINGE (ML) INJECTION PRN
Status: DISCONTINUED | OUTPATIENT
Start: 2020-01-24 | End: 2020-02-03 | Stop reason: HOSPADM

## 2020-01-24 RX ORDER — ALBUMIN, HUMAN INJ 5% 5 %
25 SOLUTION INTRAVENOUS PRN
Status: DISCONTINUED | OUTPATIENT
Start: 2020-01-24 | End: 2020-01-29

## 2020-01-24 RX ORDER — PANTOPRAZOLE SODIUM 40 MG/10ML
40 INJECTION, POWDER, LYOPHILIZED, FOR SOLUTION INTRAVENOUS DAILY
Status: COMPLETED | OUTPATIENT
Start: 2020-01-24 | End: 2020-01-24

## 2020-01-24 RX ORDER — ALBUMIN, HUMAN INJ 5% 5 %
25 SOLUTION INTRAVENOUS ONCE
Status: COMPLETED | OUTPATIENT
Start: 2020-01-24 | End: 2020-01-24

## 2020-01-24 RX ORDER — ACETAMINOPHEN 325 MG/1
650 TABLET ORAL EVERY 4 HOURS PRN
Status: DISCONTINUED | OUTPATIENT
Start: 2020-01-24 | End: 2020-01-29

## 2020-01-24 RX ORDER — MAGNESIUM SULFATE IN WATER 40 MG/ML
2 INJECTION, SOLUTION INTRAVENOUS PRN
Status: DISCONTINUED | OUTPATIENT
Start: 2020-01-24 | End: 2020-01-29

## 2020-01-24 RX ORDER — SODIUM CHLORIDE 9 MG/ML
INJECTION, SOLUTION INTRAVENOUS CONTINUOUS PRN
Status: DISCONTINUED | OUTPATIENT
Start: 2020-01-24 | End: 2020-01-24 | Stop reason: SDUPTHER

## 2020-01-24 RX ORDER — GLYCOPYRROLATE 1 MG/5 ML
SYRINGE (ML) INTRAVENOUS PRN
Status: DISCONTINUED | OUTPATIENT
Start: 2020-01-24 | End: 2020-01-24 | Stop reason: SDUPTHER

## 2020-01-24 RX ORDER — DEXTROSE MONOHYDRATE 50 MG/ML
100 INJECTION, SOLUTION INTRAVENOUS PRN
Status: DISCONTINUED | OUTPATIENT
Start: 2020-01-24 | End: 2020-02-03 | Stop reason: HOSPADM

## 2020-01-24 RX ORDER — DEXTROSE MONOHYDRATE 25 G/50ML
12.5 INJECTION, SOLUTION INTRAVENOUS PRN
Status: DISCONTINUED | OUTPATIENT
Start: 2020-01-24 | End: 2020-02-03 | Stop reason: HOSPADM

## 2020-01-24 RX ORDER — NEOSTIGMINE METHYLSULFATE 1 MG/ML
INJECTION, SOLUTION INTRAVENOUS PRN
Status: DISCONTINUED | OUTPATIENT
Start: 2020-01-24 | End: 2020-01-24 | Stop reason: SDUPTHER

## 2020-01-24 RX ORDER — KETOROLAC TROMETHAMINE 30 MG/ML
15 INJECTION, SOLUTION INTRAMUSCULAR; INTRAVENOUS EVERY 6 HOURS PRN
Status: DISPENSED | OUTPATIENT
Start: 2020-01-24 | End: 2020-01-27

## 2020-01-24 RX ORDER — HEPARIN SODIUM 10000 [USP'U]/ML
INJECTION, SOLUTION INTRAVENOUS; SUBCUTANEOUS PRN
Status: DISCONTINUED | OUTPATIENT
Start: 2020-01-24 | End: 2020-01-24 | Stop reason: SDUPTHER

## 2020-01-24 RX ORDER — TAMSULOSIN HYDROCHLORIDE 0.4 MG/1
0.4 CAPSULE ORAL DAILY
Status: DISCONTINUED | OUTPATIENT
Start: 2020-01-25 | End: 2020-02-03 | Stop reason: HOSPADM

## 2020-01-24 RX ORDER — SENNA PLUS 8.6 MG/1
1 TABLET ORAL NIGHTLY
Status: DISCONTINUED | OUTPATIENT
Start: 2020-01-25 | End: 2020-01-26

## 2020-01-24 RX ORDER — CEFAZOLIN SODIUM 1 G/3ML
INJECTION, POWDER, FOR SOLUTION INTRAMUSCULAR; INTRAVENOUS PRN
Status: DISCONTINUED | OUTPATIENT
Start: 2020-01-24 | End: 2020-01-24 | Stop reason: SDUPTHER

## 2020-01-24 RX ORDER — SODIUM CHLORIDE, SODIUM LACTATE, POTASSIUM CHLORIDE, CALCIUM CHLORIDE 600; 310; 30; 20 MG/100ML; MG/100ML; MG/100ML; MG/100ML
INJECTION, SOLUTION INTRAVENOUS CONTINUOUS PRN
Status: DISCONTINUED | OUTPATIENT
Start: 2020-01-24 | End: 2020-01-24 | Stop reason: SDUPTHER

## 2020-01-24 RX ORDER — SODIUM CHLORIDE 9 MG/ML
30 INJECTION, SOLUTION INTRAVENOUS CONTINUOUS
Status: DISCONTINUED | OUTPATIENT
Start: 2020-01-24 | End: 2020-01-29

## 2020-01-24 RX ORDER — MAGNESIUM HYDROXIDE/ALUMINUM HYDROXICE/SIMETHICONE 120; 1200; 1200 MG/30ML; MG/30ML; MG/30ML
30 SUSPENSION ORAL EVERY 4 HOURS PRN
Status: DISCONTINUED | OUTPATIENT
Start: 2020-01-24 | End: 2020-01-29

## 2020-01-24 RX ORDER — MIDAZOLAM HYDROCHLORIDE 1 MG/ML
INJECTION INTRAMUSCULAR; INTRAVENOUS
Status: COMPLETED
Start: 2020-01-24 | End: 2020-01-24

## 2020-01-24 RX ORDER — MIDAZOLAM HYDROCHLORIDE 1 MG/ML
2 INJECTION INTRAMUSCULAR; INTRAVENOUS ONCE
Status: COMPLETED | OUTPATIENT
Start: 2020-01-24 | End: 2020-01-24

## 2020-01-24 RX ORDER — DOCUSATE SODIUM 100 MG/1
100 CAPSULE, LIQUID FILLED ORAL 2 TIMES DAILY
Status: DISCONTINUED | OUTPATIENT
Start: 2020-01-24 | End: 2020-01-29

## 2020-01-24 RX ORDER — CALCIUM CHLORIDE 100 MG/ML
INJECTION INTRAVENOUS; INTRAVENTRICULAR PRN
Status: DISCONTINUED | OUTPATIENT
Start: 2020-01-24 | End: 2020-01-24 | Stop reason: SDUPTHER

## 2020-01-24 RX ORDER — 0.9 % SODIUM CHLORIDE 0.9 %
250 INTRAVENOUS SOLUTION INTRAVENOUS CONTINUOUS PRN
Status: DISCONTINUED | OUTPATIENT
Start: 2020-01-24 | End: 2020-01-29

## 2020-01-24 RX ORDER — ETOMIDATE 2 MG/ML
INJECTION INTRAVENOUS PRN
Status: DISCONTINUED | OUTPATIENT
Start: 2020-01-24 | End: 2020-01-24 | Stop reason: SDUPTHER

## 2020-01-24 RX ORDER — PROPOFOL 10 MG/ML
10 INJECTION, EMULSION INTRAVENOUS CONTINUOUS PRN
Status: DISCONTINUED | OUTPATIENT
Start: 2020-01-24 | End: 2020-01-25

## 2020-01-24 RX ORDER — CLOPIDOGREL BISULFATE 75 MG/1
75 TABLET ORAL DAILY
Status: DISCONTINUED | OUTPATIENT
Start: 2020-01-25 | End: 2020-01-27

## 2020-01-24 RX ORDER — POTASSIUM CHLORIDE 29.8 MG/ML
20 INJECTION INTRAVENOUS PRN
Status: DISCONTINUED | OUTPATIENT
Start: 2020-01-24 | End: 2020-01-29

## 2020-01-24 RX ORDER — MEPERIDINE HYDROCHLORIDE 50 MG/ML
25 INJECTION INTRAMUSCULAR; INTRAVENOUS; SUBCUTANEOUS
Status: ACTIVE | OUTPATIENT
Start: 2020-01-24 | End: 2020-01-24

## 2020-01-24 RX ORDER — NITROGLYCERIN 20 MG/100ML
10 INJECTION INTRAVENOUS CONTINUOUS PRN
Status: DISCONTINUED | OUTPATIENT
Start: 2020-01-24 | End: 2020-01-27

## 2020-01-24 RX ORDER — DEXTROSE MONOHYDRATE 25 G/50ML
INJECTION, SOLUTION INTRAVENOUS PRN
Status: DISCONTINUED | OUTPATIENT
Start: 2020-01-24 | End: 2020-01-24 | Stop reason: SDUPTHER

## 2020-01-24 RX ORDER — PROPOFOL 10 MG/ML
INJECTION, EMULSION INTRAVENOUS PRN
Status: DISCONTINUED | OUTPATIENT
Start: 2020-01-24 | End: 2020-01-24 | Stop reason: SDUPTHER

## 2020-01-24 RX ORDER — KETOROLAC TROMETHAMINE 30 MG/ML
INJECTION, SOLUTION INTRAMUSCULAR; INTRAVENOUS
Status: COMPLETED
Start: 2020-01-24 | End: 2020-01-24

## 2020-01-24 RX ORDER — ACETAMINOPHEN 650 MG/1
650 SUPPOSITORY RECTAL EVERY 4 HOURS PRN
Status: DISCONTINUED | OUTPATIENT
Start: 2020-01-24 | End: 2020-01-29

## 2020-01-24 RX ORDER — IPRATROPIUM BROMIDE AND ALBUTEROL SULFATE 2.5; .5 MG/3ML; MG/3ML
1 SOLUTION RESPIRATORY (INHALATION)
Status: DISCONTINUED | OUTPATIENT
Start: 2020-01-24 | End: 2020-02-03 | Stop reason: HOSPADM

## 2020-01-24 RX ORDER — INSULIN GLARGINE 100 [IU]/ML
0.15 INJECTION, SOLUTION SUBCUTANEOUS NIGHTLY
Status: DISCONTINUED | OUTPATIENT
Start: 2020-01-25 | End: 2020-02-03 | Stop reason: HOSPADM

## 2020-01-24 RX ORDER — PROPOFOL 10 MG/ML
INJECTION, EMULSION INTRAVENOUS
Status: COMPLETED
Start: 2020-01-24 | End: 2020-01-24

## 2020-01-24 RX ORDER — CHLORHEXIDINE GLUCONATE 0.12 MG/ML
15 RINSE ORAL 2 TIMES DAILY
Status: DISCONTINUED | OUTPATIENT
Start: 2020-01-24 | End: 2020-01-25

## 2020-01-24 RX ORDER — PROTAMINE SULFATE 10 MG/ML
INJECTION, SOLUTION INTRAVENOUS PRN
Status: DISCONTINUED | OUTPATIENT
Start: 2020-01-24 | End: 2020-01-24 | Stop reason: SDUPTHER

## 2020-01-24 RX ORDER — SODIUM CHLORIDE 9 MG/ML
10 INJECTION INTRAVENOUS DAILY
Status: COMPLETED | OUTPATIENT
Start: 2020-01-24 | End: 2020-01-24

## 2020-01-24 RX ORDER — NICOTINE POLACRILEX 4 MG
15 LOZENGE BUCCAL PRN
Status: DISCONTINUED | OUTPATIENT
Start: 2020-01-24 | End: 2020-02-03 | Stop reason: HOSPADM

## 2020-01-24 RX ORDER — ASPIRIN 81 MG/1
81 TABLET ORAL DAILY
Status: DISCONTINUED | OUTPATIENT
Start: 2020-01-25 | End: 2020-01-29

## 2020-01-24 RX ORDER — FENTANYL CITRATE 50 UG/ML
100 INJECTION, SOLUTION INTRAMUSCULAR; INTRAVENOUS ONCE
Status: COMPLETED | OUTPATIENT
Start: 2020-01-24 | End: 2020-01-24

## 2020-01-24 RX ORDER — CEFAZOLIN SODIUM 2 G/50ML
2 SOLUTION INTRAVENOUS EVERY 8 HOURS
Status: COMPLETED | OUTPATIENT
Start: 2020-01-24 | End: 2020-01-26

## 2020-01-24 RX ORDER — MIDAZOLAM HYDROCHLORIDE 1 MG/ML
INJECTION INTRAMUSCULAR; INTRAVENOUS PRN
Status: DISCONTINUED | OUTPATIENT
Start: 2020-01-24 | End: 2020-01-24 | Stop reason: SDUPTHER

## 2020-01-24 RX ORDER — PHENYLEPHRINE HYDROCHLORIDE 10 MG/ML
INJECTION INTRAVENOUS PRN
Status: DISCONTINUED | OUTPATIENT
Start: 2020-01-24 | End: 2020-01-24 | Stop reason: SDUPTHER

## 2020-01-24 RX ORDER — ONDANSETRON 2 MG/ML
4 INJECTION INTRAMUSCULAR; INTRAVENOUS EVERY 8 HOURS PRN
Status: DISCONTINUED | OUTPATIENT
Start: 2020-01-24 | End: 2020-01-29

## 2020-01-24 RX ORDER — FENTANYL CITRATE 50 UG/ML
50 INJECTION, SOLUTION INTRAMUSCULAR; INTRAVENOUS
Status: DISCONTINUED | OUTPATIENT
Start: 2020-01-24 | End: 2020-01-28

## 2020-01-24 RX ORDER — VECURONIUM BROMIDE 1 MG/ML
INJECTION, POWDER, LYOPHILIZED, FOR SOLUTION INTRAVENOUS PRN
Status: DISCONTINUED | OUTPATIENT
Start: 2020-01-24 | End: 2020-01-24 | Stop reason: SDUPTHER

## 2020-01-24 RX ORDER — OXYCODONE HYDROCHLORIDE 5 MG/1
5 TABLET ORAL EVERY 4 HOURS PRN
Status: DISCONTINUED | OUTPATIENT
Start: 2020-01-24 | End: 2020-01-29

## 2020-01-24 RX ORDER — AMINOCAPROIC ACID 250 MG/ML
INJECTION, SOLUTION INTRAVENOUS PRN
Status: DISCONTINUED | OUTPATIENT
Start: 2020-01-24 | End: 2020-01-24 | Stop reason: SDUPTHER

## 2020-01-24 RX ORDER — PANTOPRAZOLE SODIUM 40 MG/1
40 TABLET, DELAYED RELEASE ORAL DAILY
Status: DISCONTINUED | OUTPATIENT
Start: 2020-01-25 | End: 2020-01-29

## 2020-01-24 RX ADMIN — INSULIN LISPRO 3 UNITS: 100 INJECTION, SOLUTION INTRAVENOUS; SUBCUTANEOUS at 20:12

## 2020-01-24 RX ADMIN — MIDAZOLAM 2 MG: 1 INJECTION INTRAMUSCULAR; INTRAVENOUS at 06:38

## 2020-01-24 RX ADMIN — PHENYLEPHRINE HYDROCHLORIDE 100 MCG: 10 INJECTION INTRAVENOUS at 07:21

## 2020-01-24 RX ADMIN — CHLORHEXIDINE GLUCONATE 0.12% ORAL RINSE 15 ML: 1.2 LIQUID ORAL at 05:43

## 2020-01-24 RX ADMIN — VECURONIUM BROMIDE FOR INJECTION 20 MG: 1 INJECTION, POWDER, LYOPHILIZED, FOR SOLUTION INTRAVENOUS at 06:58

## 2020-01-24 RX ADMIN — KETOROLAC TROMETHAMINE 15 MG: 30 INJECTION, SOLUTION INTRAMUSCULAR; INTRAVENOUS at 17:26

## 2020-01-24 RX ADMIN — PROPOFOL 50 MG: 10 INJECTION, EMULSION INTRAVENOUS at 10:43

## 2020-01-24 RX ADMIN — MUPIROCIN: 20 OINTMENT TOPICAL at 12:41

## 2020-01-24 RX ADMIN — ATORVASTATIN CALCIUM 80 MG: 40 TABLET, FILM COATED ORAL at 21:22

## 2020-01-24 RX ADMIN — SODIUM CHLORIDE, PRESERVATIVE FREE 10 ML: 5 INJECTION INTRAVENOUS at 11:56

## 2020-01-24 RX ADMIN — PHENYLEPHRINE HYDROCHLORIDE 100 MCG: 10 INJECTION INTRAVENOUS at 07:56

## 2020-01-24 RX ADMIN — SODIUM CHLORIDE, PRESERVATIVE FREE 10 ML: 5 INJECTION INTRAVENOUS at 21:23

## 2020-01-24 RX ADMIN — HEPARIN SODIUM 35000 UNITS: 10000 INJECTION, SOLUTION INTRAVENOUS; SUBCUTANEOUS at 07:53

## 2020-01-24 RX ADMIN — CHLORHEXIDINE GLUCONATE 0.12% ORAL RINSE 15 ML: 1.2 LIQUID ORAL at 12:41

## 2020-01-24 RX ADMIN — FENTANYL CITRATE 250 MCG: 50 INJECTION, SOLUTION INTRAMUSCULAR; INTRAVENOUS at 07:27

## 2020-01-24 RX ADMIN — MIDAZOLAM 2 MG: 1 INJECTION INTRAMUSCULAR; INTRAVENOUS at 06:49

## 2020-01-24 RX ADMIN — FENTANYL CITRATE 50 MCG: 50 INJECTION, SOLUTION INTRAMUSCULAR; INTRAVENOUS at 06:58

## 2020-01-24 RX ADMIN — PHENYLEPHRINE HYDROCHLORIDE 100 MCG: 10 INJECTION INTRAVENOUS at 07:59

## 2020-01-24 RX ADMIN — PHENYLEPHRINE HYDROCHLORIDE 100 MCG: 10 INJECTION INTRAVENOUS at 07:34

## 2020-01-24 RX ADMIN — SODIUM CHLORIDE, POTASSIUM CHLORIDE, SODIUM LACTATE AND CALCIUM CHLORIDE: 600; 310; 30; 20 INJECTION, SOLUTION INTRAVENOUS at 06:54

## 2020-01-24 RX ADMIN — CALCIUM CHLORIDE 1 G: 100 INJECTION INTRAVENOUS; INTRAVENTRICULAR at 09:30

## 2020-01-24 RX ADMIN — SODIUM CHLORIDE 30 ML/HR: 9 INJECTION, SOLUTION INTRAVENOUS at 11:49

## 2020-01-24 RX ADMIN — PROTAMINE SULFATE 300 MG: 10 INJECTION, SOLUTION INTRAVENOUS at 09:21

## 2020-01-24 RX ADMIN — SODIUM CHLORIDE, POTASSIUM CHLORIDE, SODIUM LACTATE AND CALCIUM CHLORIDE: 600; 310; 30; 20 INJECTION, SOLUTION INTRAVENOUS at 06:55

## 2020-01-24 RX ADMIN — EPINEPHRINE 0.04 MCG/KG/MIN: 1 INJECTION PARENTERAL at 09:09

## 2020-01-24 RX ADMIN — FENTANYL CITRATE 50 MCG: 50 INJECTION, SOLUTION INTRAMUSCULAR; INTRAVENOUS at 12:59

## 2020-01-24 RX ADMIN — PHENYLEPHRINE HYDROCHLORIDE 100 MCG: 10 INJECTION INTRAVENOUS at 07:31

## 2020-01-24 RX ADMIN — ETOMIDATE 16 MG: 2 INJECTION, SOLUTION INTRAVENOUS at 06:58

## 2020-01-24 RX ADMIN — IPRATROPIUM BROMIDE AND ALBUTEROL SULFATE 1 AMPULE: 2.5; .5 SOLUTION RESPIRATORY (INHALATION) at 16:00

## 2020-01-24 RX ADMIN — CHLORHEXIDINE GLUCONATE 0.12% ORAL RINSE 15 ML: 1.2 LIQUID ORAL at 21:22

## 2020-01-24 RX ADMIN — PHENYLEPHRINE HYDROCHLORIDE 100 MCG: 10 INJECTION INTRAVENOUS at 08:22

## 2020-01-24 RX ADMIN — DEXTROSE MONOHYDRATE 6.25 G: 25 INJECTION, SOLUTION INTRAVENOUS at 08:53

## 2020-01-24 RX ADMIN — SODIUM CHLORIDE, POTASSIUM CHLORIDE, SODIUM LACTATE AND CALCIUM CHLORIDE: 600; 310; 30; 20 INJECTION, SOLUTION INTRAVENOUS at 07:17

## 2020-01-24 RX ADMIN — Medication 3 MG: at 10:43

## 2020-01-24 RX ADMIN — FENTANYL CITRATE 250 MCG: 50 INJECTION, SOLUTION INTRAMUSCULAR; INTRAVENOUS at 07:25

## 2020-01-24 RX ADMIN — CEFAZOLIN 2000 MG: 1 INJECTION, POWDER, FOR SOLUTION INTRAMUSCULAR; INTRAVENOUS at 07:06

## 2020-01-24 RX ADMIN — EPINEPHRINE 3 MCG/MIN: 1 INJECTION INTRAMUSCULAR; INTRAVENOUS; SUBCUTANEOUS at 12:45

## 2020-01-24 RX ADMIN — ASPIRIN 300 MG: 300 SUPPOSITORY RECTAL at 12:41

## 2020-01-24 RX ADMIN — MIDAZOLAM HYDROCHLORIDE 2 MG: 1 INJECTION INTRAMUSCULAR; INTRAVENOUS at 11:51

## 2020-01-24 RX ADMIN — INSULIN LISPRO 3 UNITS: 100 INJECTION, SOLUTION INTRAVENOUS; SUBCUTANEOUS at 15:39

## 2020-01-24 RX ADMIN — Medication 0.6 MG: at 10:43

## 2020-01-24 RX ADMIN — MIDAZOLAM 2 MG: 1 INJECTION INTRAMUSCULAR; INTRAVENOUS at 11:51

## 2020-01-24 RX ADMIN — DOCUSATE SODIUM 100 MG: 100 CAPSULE, LIQUID FILLED ORAL at 21:22

## 2020-01-24 RX ADMIN — ALBUMIN (HUMAN) 25 G: 12.5 INJECTION, SOLUTION INTRAVENOUS at 12:46

## 2020-01-24 RX ADMIN — CEFAZOLIN 2000 MG: 1 INJECTION, POWDER, FOR SOLUTION INTRAMUSCULAR; INTRAVENOUS at 09:27

## 2020-01-24 RX ADMIN — PROPOFOL 10 MCG/KG/MIN: 10 INJECTION, EMULSION INTRAVENOUS at 11:00

## 2020-01-24 RX ADMIN — PANTOPRAZOLE SODIUM 40 MG: 40 INJECTION, POWDER, FOR SOLUTION INTRAVENOUS at 11:55

## 2020-01-24 RX ADMIN — FENTANYL CITRATE 25 MCG: 50 INJECTION, SOLUTION INTRAMUSCULAR; INTRAVENOUS at 20:10

## 2020-01-24 RX ADMIN — FENTANYL CITRATE 200 MCG: 50 INJECTION, SOLUTION INTRAMUSCULAR; INTRAVENOUS at 07:24

## 2020-01-24 RX ADMIN — CEFAZOLIN SODIUM 2 G: 2 SOLUTION INTRAVENOUS at 17:27

## 2020-01-24 RX ADMIN — PROPOFOL INJECTABLE EMULSION 10 MCG/KG/MIN: 10 INJECTION, EMULSION INTRAVENOUS at 11:00

## 2020-01-24 RX ADMIN — PHENYLEPHRINE HYDROCHLORIDE 100 MCG: 10 INJECTION INTRAVENOUS at 07:52

## 2020-01-24 RX ADMIN — INSULIN HUMAN 5 UNITS: 100 INJECTION, SOLUTION PARENTERAL at 08:53

## 2020-01-24 RX ADMIN — FENTANYL CITRATE 100 MCG: 50 INJECTION, SOLUTION INTRAMUSCULAR; INTRAVENOUS at 11:50

## 2020-01-24 RX ADMIN — INSULIN LISPRO 3 UNITS: 100 INJECTION, SOLUTION INTRAVENOUS; SUBCUTANEOUS at 11:59

## 2020-01-24 RX ADMIN — VASOPRESSIN 0.02 UNITS/MIN: 20 INJECTION INTRAVENOUS at 09:35

## 2020-01-24 RX ADMIN — ALBUMIN (HUMAN) 12.5 G: 12.5 INJECTION, SOLUTION INTRAVENOUS at 16:26

## 2020-01-24 RX ADMIN — AMINOCAPROIC ACID 5000 MG: 250 INJECTION, SOLUTION INTRAVENOUS at 07:00

## 2020-01-24 RX ADMIN — SODIUM CHLORIDE: 9 INJECTION, SOLUTION INTRAVENOUS at 06:34

## 2020-01-24 RX ADMIN — MUPIROCIN: 20 OINTMENT TOPICAL at 21:22

## 2020-01-24 RX ADMIN — AMINOCAPROIC ACID 1 G/HR: 250 INJECTION, SOLUTION INTRAVENOUS at 07:17

## 2020-01-24 ASSESSMENT — PULMONARY FUNCTION TESTS
PIF_VALUE: 24
PIF_VALUE: 24
PIF_VALUE: 23
PIF_VALUE: 0
PIF_VALUE: 23
PIF_VALUE: 1
PIF_VALUE: 0
PIF_VALUE: 0
PIF_VALUE: 1
PIF_VALUE: 27
PIF_VALUE: 26
PIF_VALUE: 1
PIF_VALUE: 1
PIF_VALUE: 23
PIF_VALUE: 1
PIF_VALUE: 23
PIF_VALUE: 23
PIF_VALUE: 25
PIF_VALUE: 25
PIF_VALUE: 23
PIF_VALUE: 1
PIF_VALUE: 0
PIF_VALUE: 1
PIF_VALUE: 24
PIF_VALUE: 23
PIF_VALUE: 24
PIF_VALUE: 23
PIF_VALUE: 22
PIF_VALUE: 1
PIF_VALUE: 25
PIF_VALUE: 1
PIF_VALUE: 24
PIF_VALUE: 24
PIF_VALUE: 26
PIF_VALUE: 1
PIF_VALUE: 24
PIF_VALUE: 17
PIF_VALUE: 1
PIF_VALUE: 20
PIF_VALUE: 4
PIF_VALUE: 25
PIF_VALUE: 27
PIF_VALUE: 26
PIF_VALUE: 21
PIF_VALUE: 1
PIF_VALUE: 23
PIF_VALUE: 21
PIF_VALUE: 23
PIF_VALUE: 22
PIF_VALUE: 24
PIF_VALUE: 18
PIF_VALUE: 24
PIF_VALUE: 24
PIF_VALUE: 1
PIF_VALUE: 2
PIF_VALUE: 23
PIF_VALUE: 1
PIF_VALUE: 15
PIF_VALUE: 30
PIF_VALUE: 1
PIF_VALUE: 1
PIF_VALUE: 31
PIF_VALUE: 1
PIF_VALUE: 32
PIF_VALUE: 22
PIF_VALUE: 1
PIF_VALUE: 24
PIF_VALUE: 1
PIF_VALUE: 23
PIF_VALUE: 25
PIF_VALUE: 25
PIF_VALUE: 24
PIF_VALUE: 1
PIF_VALUE: 27
PIF_VALUE: 28
PIF_VALUE: 25
PIF_VALUE: 24
PIF_VALUE: 29
PIF_VALUE: 1
PIF_VALUE: 1
PIF_VALUE: 23
PIF_VALUE: 25
PIF_VALUE: 1
PIF_VALUE: 1
PIF_VALUE: 24
PIF_VALUE: 22
PIF_VALUE: 23
PIF_VALUE: 27
PIF_VALUE: 25
PIF_VALUE: 30
PIF_VALUE: 1
PIF_VALUE: 27
PIF_VALUE: 25
PIF_VALUE: 24
PIF_VALUE: 1
PIF_VALUE: 29
PIF_VALUE: 20
PIF_VALUE: 2
PIF_VALUE: 28
PIF_VALUE: 23
PIF_VALUE: 23
PIF_VALUE: 32
PIF_VALUE: 22
PIF_VALUE: 23
PIF_VALUE: 25
PIF_VALUE: 23
PIF_VALUE: 24
PIF_VALUE: 23
PIF_VALUE: 14
PIF_VALUE: 23
PIF_VALUE: 24
PIF_VALUE: 23
PIF_VALUE: 27
PIF_VALUE: 22
PIF_VALUE: 3
PIF_VALUE: 26
PIF_VALUE: 18
PIF_VALUE: 24
PIF_VALUE: 26
PIF_VALUE: 24
PIF_VALUE: 24
PIF_VALUE: 1
PIF_VALUE: 26
PIF_VALUE: 25
PIF_VALUE: 37
PIF_VALUE: 1
PIF_VALUE: 24
PIF_VALUE: 25
PIF_VALUE: 24
PIF_VALUE: 24
PIF_VALUE: 31
PIF_VALUE: 23
PIF_VALUE: 23
PIF_VALUE: 19
PIF_VALUE: 25
PIF_VALUE: 27
PIF_VALUE: 24
PIF_VALUE: 23
PIF_VALUE: 25
PIF_VALUE: 24
PIF_VALUE: 1
PIF_VALUE: 20
PIF_VALUE: 1
PIF_VALUE: 1
PIF_VALUE: 21
PIF_VALUE: 24
PIF_VALUE: 24
PIF_VALUE: 1
PIF_VALUE: 21
PIF_VALUE: 26
PIF_VALUE: 1
PIF_VALUE: 15
PIF_VALUE: 22
PIF_VALUE: 19
PIF_VALUE: 24
PIF_VALUE: 20
PIF_VALUE: 1
PIF_VALUE: 25
PIF_VALUE: 19
PIF_VALUE: 24
PIF_VALUE: 1
PIF_VALUE: 25
PIF_VALUE: 24
PIF_VALUE: 24
PIF_VALUE: 25
PIF_VALUE: 24
PIF_VALUE: 1
PIF_VALUE: 23
PIF_VALUE: 0
PIF_VALUE: 24
PIF_VALUE: 1
PIF_VALUE: 1
PIF_VALUE: 22
PIF_VALUE: 27
PIF_VALUE: 0
PIF_VALUE: 3
PIF_VALUE: 25
PIF_VALUE: 26
PIF_VALUE: 24
PIF_VALUE: 30
PIF_VALUE: 1
PIF_VALUE: 23
PIF_VALUE: 12
PIF_VALUE: 26
PIF_VALUE: 23
PIF_VALUE: 24
PIF_VALUE: 33
PIF_VALUE: 26
PIF_VALUE: 2
PIF_VALUE: 23
PIF_VALUE: 0
PIF_VALUE: 26
PIF_VALUE: 25
PIF_VALUE: 28
PIF_VALUE: 1
PIF_VALUE: 19
PIF_VALUE: 25
PIF_VALUE: 0
PIF_VALUE: 34
PIF_VALUE: 23
PIF_VALUE: 23
PIF_VALUE: 25
PIF_VALUE: 19
PIF_VALUE: 24
PIF_VALUE: 0
PIF_VALUE: 23
PIF_VALUE: 25
PIF_VALUE: 28
PIF_VALUE: 24
PIF_VALUE: 25
PIF_VALUE: 2
PIF_VALUE: 27
PIF_VALUE: 0
PIF_VALUE: 22
PIF_VALUE: 27
PIF_VALUE: 24
PIF_VALUE: 22
PIF_VALUE: 0
PIF_VALUE: 27
PIF_VALUE: 34
PIF_VALUE: 1
PIF_VALUE: 1
PIF_VALUE: 16
PIF_VALUE: 1
PIF_VALUE: 21
PIF_VALUE: 23
PIF_VALUE: 27
PIF_VALUE: 25
PIF_VALUE: 24
PIF_VALUE: 23
PIF_VALUE: 26
PIF_VALUE: 23
PIF_VALUE: 2
PIF_VALUE: 21
PIF_VALUE: 22
PIF_VALUE: 23
PIF_VALUE: 25
PIF_VALUE: 1
PIF_VALUE: 24
PIF_VALUE: 23
PIF_VALUE: 24
PIF_VALUE: 22
PIF_VALUE: 22
PIF_VALUE: 24
PIF_VALUE: 21
PIF_VALUE: 22
PIF_VALUE: 24
PIF_VALUE: 26
PIF_VALUE: 23
PIF_VALUE: 23
PIF_VALUE: 28
PIF_VALUE: 22
PIF_VALUE: 22
PIF_VALUE: 24
PIF_VALUE: 33
PIF_VALUE: 24

## 2020-01-24 ASSESSMENT — PAIN SCALES - GENERAL
PAINLEVEL_OUTOF10: 0
PAINLEVEL_OUTOF10: 6
PAINLEVEL_OUTOF10: 0
PAINLEVEL_OUTOF10: 3
PAINLEVEL_OUTOF10: 7
PAINLEVEL_OUTOF10: 0
PAINLEVEL_OUTOF10: 0
PAINLEVEL_OUTOF10: 5
PAINLEVEL_OUTOF10: 8

## 2020-01-24 ASSESSMENT — PAIN DESCRIPTION - PROGRESSION: CLINICAL_PROGRESSION: NOT CHANGED

## 2020-01-24 ASSESSMENT — PAIN DESCRIPTION - FREQUENCY: FREQUENCY: CONTINUOUS

## 2020-01-24 ASSESSMENT — PAIN DESCRIPTION - ONSET: ONSET: ON-GOING

## 2020-01-24 ASSESSMENT — PAIN - FUNCTIONAL ASSESSMENT: PAIN_FUNCTIONAL_ASSESSMENT: PREVENTS OR INTERFERES SOME ACTIVE ACTIVITIES AND ADLS

## 2020-01-24 ASSESSMENT — PAIN DESCRIPTION - PAIN TYPE: TYPE: SURGICAL PAIN;ACUTE PAIN

## 2020-01-24 ASSESSMENT — PAIN DESCRIPTION - LOCATION: LOCATION: CHEST;GENERALIZED;INCISION

## 2020-01-24 ASSESSMENT — PAIN DESCRIPTION - DESCRIPTORS: DESCRIPTORS: ACHING;CONSTANT;SHARP

## 2020-01-24 NOTE — PROGRESS NOTES
Pt. Signed out of anesthesia, patient alert, follows commands, CASTREJON, hand grasps equal bilaterally. Restraints discontinued.

## 2020-01-24 NOTE — PROGRESS NOTES
Patient is seen in follow-up for Non-ST elevation MI    Subjective:     Mr. Karen Carrasco better today, denies any chest pain, no shortness of breath  Laying in bed in no apparent distress    ROS:  CONSTITUTIONAL:  negative for  fevers, chills  HEENT:  negative for earaches, nasal congestion and epistaxis  RESPIRATORY:  negative for  dry cough, cough with sputum,wheezing and hemoptysis  GASTROINTESTINAL:  negative for nausea, vomiting  MUSCULOSKELETAL:  negative for  myalgias, arthralgias  NEUROLOGICAL:  negative for visual disturbance, dysphagia    Medication side effects:none    Scheduled Meds:   vitamin B-12  500 mcg Oral Daily    sodium chloride flush  10 mL Intravenous 2 times per day    [START ON 1/24/2020] ceFAZolin (ANCEF) IVPB  2 g Intravenous On Call to OR    mupirocin   Nasal BID    [START ON 1/24/2020] chlorhexidine  15 mL Mouth/Throat Once    chlorhexidine   Topical See Admin Instructions    chlorhexidine  15 mL Mouth/Throat BID    pantoprazole  40 mg Oral QAM AC    aspirin  81 mg Oral Daily    atorvastatin  80 mg Oral Nightly    carvedilol  6.25 mg Oral BID WC    spironolactone  25 mg Oral Daily     Continuous Infusions:  PRN Meds:sodium chloride flush, acetaminophen, magnesium hydroxide, zolpidem, ondansetron, calcium carbonate      Objective:      Physical Exam:   BP (!) 119/56   Pulse 83   Temp 97.8 °F (36.6 °C) (Temporal)   Resp 21   Ht 5' 7\" (1.702 m)   Wt 183 lb 6.8 oz (83.2 kg)   SpO2 92%   BMI 28.73 kg/m²   CONSTITUTIONAL:  awake, alert, cooperative, no apparent distress, and appears stated age  HEAD:  normocepalic, without obvious abnormality, atraumatic  NECK:  Supple, symmetrical, trachea midline, no adenopathy, thyroid symmetric, not enlarged and no tenderness, skin normal  LUNGS:  No increased work of breathing, good air exchange, clear to auscultation bilaterally, no crackles or wheezing  CARDIOVASCULAR:  Normal apical impulse, regular rate and rhythm, normal S1 and S2, testing as outlined above:    Assessment:     1. Non-ST elevation MI: Chest pain free, stable on current medications and hemodynamic support of intra-aortic balloon pump  2. Multivessel CAD: For possible CABG, pending evaluation by CT surgery  3. Severe ischemic cardiomyopathy: We'll start Aldactone, would continue beta blocker, continue Intra-aortic balloon pump support  4. Severe mitral valve regurgitation noted on ventriculography: Will obtain echocardiogram for further evaluation      Recommendations:     1. Aldactone 25 mg one per month. 2.  Continue aggressive medications  3. Continue intra-aortic balloon pump Hemodynamic support  4. Further recommendations will be forthcoming pending his clinical course and diagnostic test findings    Discussed with patient and family at bedside  Discussed with nursing staff    Critical care time spent reviewing labs/films, examining patient,disussion with nursing staff and family, is 32 minutes. Electronically signed by Rubin Jo MD on 1/23/2020 at 10:38 PM  NOTE: This report was transcribed using voice recognition software.  Every effort was made to ensure accuracy; however, inadvertent computerized transcription errors may be present

## 2020-01-24 NOTE — FLOWSHEET NOTE
Verified with blood bank--2 units RBCs will be available for surgery tomorrow. Current crossmatch expires at midnight on 1/25/2020.

## 2020-01-24 NOTE — PROGRESS NOTES
01/23/2020    GLUCOSEU Negative 01/23/2020       Radiology:  US DUP LOWER EXTREMITY MAPPING BILAT VENOUS   Final Result   Mapping of the right and left great saphenous veins as detailed in the   body of the report. US CAROTID ARTERY BILATERAL   Final Result   Spectral Doppler sonographic findings consistent with the presence of   an intra-aortic balloon pump. Mild bilateral carotid atherosclerosis. Estimated stenosis in the right and left internal carotid arteries is   0-49% by NASCET criteria. VL ERROL BILATERAL LIMITED 1-2 LEVELS   Final Result      CT Chest WO Contrast   Final Result      1. Small bilateral pleural effusions and bibasilar atelectasis right   more than left. 2. Aorta and pulmonary arteries have normal size. Mild CAD. 3. Probable few tiny noncalcified gallstones.                XR CHEST PORTABLE   Final Result   Small amount of pleural thickening left costophrenic angle                       Assessment/Plan:    Active Hospital Problems    Diagnosis    CAD in native artery [I25.10]    Non-ST elevation MI (NSTEMI) ([facility] [address] [I21.4]    Chest pain [R07.9]     66 yo male hx ex smoker, vit d/b12 def, came to hospital with chest pain elevated trop, nstemi and s/p cath showing severe multivessel CAD, severe CMP EF 20% needing IABP, severe MR,     NSTEMI  CAD  Severe MR on angio  Ex smoker  VIT D/B12 def     PLAN  To OR today  Coreg/asa/statin/aldactone  Dental cleared for OR      DVT Prophylaxis: scd  Diet: Diet NPO Time Specified Exceptions are: Sips with Meds  Code Status: Full Code    PT/OT Eval Status: cvicu    Dispo - ip    Erin Ghosh MD

## 2020-01-24 NOTE — ANESTHESIA POSTPROCEDURE EVALUATION
Department of Anesthesiology  Postprocedure Note    Patient: Brandee De La Rosa  MRN: [id number]  YOB: 1945  Date of evaluation: 1/24/2020  Time:  6:10 AM     Procedure Summary     Date:  01/23/20 [facility] / [facility]    Anesthesia Start:  3229 Anesthesia Stop:  1629    Procedure:  KAMI (BEDSIDE) Diagnosis:      Scheduled Providers:   Responsible Provider:  Bri Rebollar MD    Anesthesia Type:  MAC ASA Status:  4          Anesthesia Type: MAC    Dax Phase I:      Dax Phase II:      Last vitals: Reviewed and per EMR flowsheets.        Anesthesia Post Evaluation    Patient location during evaluation: ICU  Patient participation: complete - patient participated  Level of consciousness: awake  Airway patency: patent  Nausea & Vomiting: no nausea and no vomiting  Complications: no  Cardiovascular status: hemodynamically stable  Respiratory status: acceptable  Hydration status: stable

## 2020-01-24 NOTE — ANESTHESIA PROCEDURE NOTES
Arterial Line:    An arterial line was placed using surface landmarks, in the OR for the following indication(s): Rach Evans A 20 gauge (size), 12 cm (length), (type) catheter was placed, Seldinger technique not used, into the right brachial artery, secured by tape and Tegaderm.   Anesthesia type: Local  Local infiltration: Injection  Anesthesiologist: Jimmy Hernandes DO  Resident/CRNA: SATHYA Corcoran - CRNA  Other anesthesia staff: Nedra Jain RN  Performed: Resident/CRNA and Other anesthesia staff   Preanesthetic Checklist  Completed: patient identified, site marked, surgical consent, pre-op evaluation, timeout performed, IV checked, risks and benefits discussed, monitors and equipment checked, anesthesia consent given, oxygen available and patient being monitored

## 2020-01-24 NOTE — ANESTHESIA PROCEDURE NOTES
Procedure Performed: KAMI     Start Time:        End Time:      Preanesthesia Checklist:  Patient identified, IV assessed, risks and benefits discussed, monitors and equipment assessed, procedure being performed at surgeon's request and anesthesia consent obtained. General Procedure Information  Diagnostic Indications for Echo:  assessment of surgical repair, hemodynamic monitoring and assessment of valve function  Physician Requesting Echo: Elisa Watson DO  Location performed:  OR  Intubated  Bite block placed  Heart visualized  Probe Insertion:  Easy  Probe Type:  3D and mulitplane  Modalities:  3D, color flow mapping, pulse wave Doppler and continuous wave Doppler    Echocardiographic and Doppler Measurements    Ventricles    Right Ventricle:  Cavity size normal.  Hypertrophy not present. Thrombus not present. Global function normal.    Left Ventricle:  Cavity size normal.  Hypertrophy not present. Thrombus not present. Global Function moderately impaired. Ejection Fraction 40%. Ventricular Regional Function:  1- Basal Anteroseptal:  hypokinetic  2- Basal Anterior:  hypokinetic  3- Basal Anterolateral:  hypokinetic  4- Basal Inferolateral:  normal  5- Basal Inferior:  hypokinetic  6- Basal Inferoseptal:  hypokinetic  7- Mid Anteroseptal:  akinetic  8- Mid Anterior:  hypokinetic  9- Mid Anterolateral:  hypokinetic  10- Mid Inferolateral:  normal  11- Mid Inferior:  hypokinetic  12- Mid Inferoseptal:  hypokinetic  13- Apical Anterior:  hypokinetic  14- Apical Lateral:  hypokinetic  15- Apical Inferior:  hypokinetic  16- Apical Septal:  akinetic  17- Wall:  hypokinetic      Valves    Aortic Valve: Annulus normal.  Stenosis not present. Leaflets normal.  Leaflet motions normal.      Mitral Valve: Annulus normal.  Stenosis not present. Regurgitation mild. Leaflets normal.  Leaflet motions normal.      Tricuspid Valve: Annulus normal.  Stenosis not present. Regurgitation mild.   Leaflets normal.

## 2020-01-24 NOTE — PROGRESS NOTES
The pt was suctioned prior to extubation. The pt was extubated to a 6 lpm nasal cannula with no stridor.

## 2020-01-24 NOTE — ANESTHESIA POSTPROCEDURE EVALUATION
Department of Anesthesiology  Postprocedure Note    Patient: Eric Clark  MRN: [id number]  YOB: 1945  Date of evaluation: 1/24/2020  Time:  1:20 PM     Procedure Summary     Date:  01/24/20 Room / Location:  [facility] OR 01 / [facility]    Anesthesia Start:  0582 Anesthesia Stop:  8764    Procedure:  , CORONARY ARTERY BYPASS, KAMI (N/A ) Diagnosis:  (.)    Surgeon:  Casandra Casas DO Responsible Provider:  Lani Patel DO    Anesthesia Type:  general ASA Status:  4          Anesthesia Type: general    Dax Phase I:      Dax Phase II:      Last vitals: Reviewed and per EMR flowsheets.        Anesthesia Post Evaluation    Patient location during evaluation: ICU  Patient participation: complete - patient cannot participate  Level of consciousness: sedated and ventilated  Airway patency: patent  Nausea & Vomiting: no nausea and no vomiting  Complications: no  Cardiovascular status: blood pressure returned to baseline  Respiratory status: acceptable  Hydration status: euvolemic

## 2020-01-24 NOTE — PROGRESS NOTES
Patient extubated to 6L NC per RT. Lung sounds clear, patient following commands, CASTREJON equally.  Voice intact

## 2020-01-25 ENCOUNTER — APPOINTMENT (OUTPATIENT)
Dept: GENERAL RADIOLOGY | Age: 75
DRG: 233 | End: 2020-01-25
Attending: INTERNAL MEDICINE
Payer: MEDICARE

## 2020-01-25 LAB
ANION GAP SERPL CALCULATED.3IONS-SCNC: 16 MMOL/L (ref 7–16)
BUN BLDV-MCNC: 23 MG/DL (ref 8–23)
CALCIUM SERPL-MCNC: 8 MG/DL (ref 8.6–10.2)
CHLORIDE BLD-SCNC: 106 MMOL/L (ref 98–107)
CO2: 19 MMOL/L (ref 22–29)
CREAT SERPL-MCNC: 1.1 MG/DL (ref 0.7–1.2)
GFR AFRICAN AMERICAN: >60
GFR NON-AFRICAN AMERICAN: >60 ML/MIN/1.73
GLUCOSE BLD-MCNC: 163 MG/DL (ref 74–99)
HCT VFR BLD CALC: 36.9 % (ref 37–54)
HEMOGLOBIN: 11.6 G/DL (ref 12.5–16.5)
MAGNESIUM: 2.5 MG/DL (ref 1.6–2.6)
MCH RBC QN AUTO: 28 PG (ref 26–35)
MCHC RBC AUTO-ENTMCNC: 31.4 % (ref 32–34.5)
MCV RBC AUTO: 88.9 FL (ref 80–99.9)
METER GLUCOSE: 121 MG/DL (ref 74–99)
METER GLUCOSE: 156 MG/DL (ref 74–99)
METER GLUCOSE: 230 MG/DL (ref 74–99)
METER GLUCOSE: 235 MG/DL (ref 74–99)
METER GLUCOSE: 254 MG/DL (ref 74–99)
METER GLUCOSE: 276 MG/DL (ref 74–99)
PDW BLD-RTO: 13.7 FL (ref 11.5–15)
PLATELET # BLD: 173 E9/L (ref 130–450)
PMV BLD AUTO: 11.1 FL (ref 7–12)
POTASSIUM SERPL-SCNC: 4.7 MMOL/L (ref 3.5–5)
RBC # BLD: 4.15 E12/L (ref 3.8–5.8)
SODIUM BLD-SCNC: 141 MMOL/L (ref 132–146)
URINE CULTURE, ROUTINE: NORMAL
WBC # BLD: 23.6 E9/L (ref 4.5–11.5)

## 2020-01-25 PROCEDURE — 83735 ASSAY OF MAGNESIUM: CPT

## 2020-01-25 PROCEDURE — 36415 COLL VENOUS BLD VENIPUNCTURE: CPT

## 2020-01-25 PROCEDURE — 6370000000 HC RX 637 (ALT 250 FOR IP): Performed by: THORACIC SURGERY (CARDIOTHORACIC VASCULAR SURGERY)

## 2020-01-25 PROCEDURE — 71045 X-RAY EXAM CHEST 1 VIEW: CPT

## 2020-01-25 PROCEDURE — 6360000002 HC RX W HCPCS: Performed by: INTERNAL MEDICINE

## 2020-01-25 PROCEDURE — 2000000000 HC ICU R&B

## 2020-01-25 PROCEDURE — 6360000002 HC RX W HCPCS: Performed by: THORACIC SURGERY (CARDIOTHORACIC VASCULAR SURGERY)

## 2020-01-25 PROCEDURE — 2580000003 HC RX 258: Performed by: THORACIC SURGERY (CARDIOTHORACIC VASCULAR SURGERY)

## 2020-01-25 PROCEDURE — 2580000003 HC RX 258: Performed by: INTERNAL MEDICINE

## 2020-01-25 PROCEDURE — P9045 ALBUMIN (HUMAN), 5%, 250 ML: HCPCS | Performed by: THORACIC SURGERY (CARDIOTHORACIC VASCULAR SURGERY)

## 2020-01-25 PROCEDURE — 94660 CPAP INITIATION&MGMT: CPT

## 2020-01-25 PROCEDURE — 85027 COMPLETE CBC AUTOMATED: CPT

## 2020-01-25 PROCEDURE — P9047 ALBUMIN (HUMAN), 25%, 50ML: HCPCS | Performed by: THORACIC SURGERY (CARDIOTHORACIC VASCULAR SURGERY)

## 2020-01-25 PROCEDURE — 82962 GLUCOSE BLOOD TEST: CPT

## 2020-01-25 PROCEDURE — 99233 SBSQ HOSP IP/OBS HIGH 50: CPT | Performed by: INTERNAL MEDICINE

## 2020-01-25 PROCEDURE — 2700000000 HC OXYGEN THERAPY PER DAY

## 2020-01-25 PROCEDURE — 80048 BASIC METABOLIC PNL TOTAL CA: CPT

## 2020-01-25 PROCEDURE — 94669 MECHANICAL CHEST WALL OSCILL: CPT

## 2020-01-25 PROCEDURE — 2580000003 HC RX 258: Performed by: NURSE PRACTITIONER

## 2020-01-25 PROCEDURE — 94640 AIRWAY INHALATION TREATMENT: CPT

## 2020-01-25 PROCEDURE — 6360000002 HC RX W HCPCS: Performed by: NURSE PRACTITIONER

## 2020-01-25 RX ORDER — DIGOXIN 0.25 MG/ML
125 INJECTION INTRAMUSCULAR; INTRAVENOUS ONCE
Status: COMPLETED | OUTPATIENT
Start: 2020-01-25 | End: 2020-01-25

## 2020-01-25 RX ORDER — ALBUMIN (HUMAN) 12.5 G/50ML
25 SOLUTION INTRAVENOUS ONCE
Status: COMPLETED | OUTPATIENT
Start: 2020-01-25 | End: 2020-01-25

## 2020-01-25 RX ORDER — DIGOXIN 0.25 MG/ML
125 INJECTION INTRAMUSCULAR; INTRAVENOUS DAILY
Status: DISCONTINUED | OUTPATIENT
Start: 2020-01-25 | End: 2020-01-25

## 2020-01-25 RX ORDER — DIGOXIN 0.25 MG/ML
250 INJECTION INTRAMUSCULAR; INTRAVENOUS ONCE
Status: COMPLETED | OUTPATIENT
Start: 2020-01-25 | End: 2020-01-25

## 2020-01-25 RX ADMIN — INSULIN LISPRO 6 UNITS: 100 INJECTION, SOLUTION INTRAVENOUS; SUBCUTANEOUS at 14:42

## 2020-01-25 RX ADMIN — IPRATROPIUM BROMIDE AND ALBUTEROL SULFATE 1 AMPULE: 2.5; .5 SOLUTION RESPIRATORY (INHALATION) at 19:10

## 2020-01-25 RX ADMIN — ACETAMINOPHEN 650 MG: 650 SUPPOSITORY RECTAL at 21:09

## 2020-01-25 RX ADMIN — TAMSULOSIN HYDROCHLORIDE 0.4 MG: 0.4 CAPSULE ORAL at 09:04

## 2020-01-25 RX ADMIN — INSULIN GLARGINE 12 UNITS: 100 INJECTION, SOLUTION SUBCUTANEOUS at 21:08

## 2020-01-25 RX ADMIN — CEFAZOLIN SODIUM 2 G: 2 SOLUTION INTRAVENOUS at 01:36

## 2020-01-25 RX ADMIN — IPRATROPIUM BROMIDE AND ALBUTEROL SULFATE 1 AMPULE: 2.5; .5 SOLUTION RESPIRATORY (INHALATION) at 13:45

## 2020-01-25 RX ADMIN — AMIODARONE HYDROCHLORIDE 1 MG/MIN: 50 INJECTION, SOLUTION INTRAVENOUS at 09:45

## 2020-01-25 RX ADMIN — AMIODARONE HYDROCHLORIDE 0.5 MG/MIN: 50 INJECTION, SOLUTION INTRAVENOUS at 18:24

## 2020-01-25 RX ADMIN — FENTANYL CITRATE 25 MCG: 50 INJECTION, SOLUTION INTRAMUSCULAR; INTRAVENOUS at 03:07

## 2020-01-25 RX ADMIN — DOCUSATE SODIUM 100 MG: 100 CAPSULE, LIQUID FILLED ORAL at 09:04

## 2020-01-25 RX ADMIN — SODIUM CHLORIDE, PRESERVATIVE FREE 10 ML: 5 INJECTION INTRAVENOUS at 21:09

## 2020-01-25 RX ADMIN — MAGNESIUM GLUCONATE 500 MG ORAL TABLET 400 MG: 500 TABLET ORAL at 09:01

## 2020-01-25 RX ADMIN — INSULIN LISPRO 6 UNITS: 100 INJECTION, SOLUTION INTRAVENOUS; SUBCUTANEOUS at 21:09

## 2020-01-25 RX ADMIN — IPRATROPIUM BROMIDE AND ALBUTEROL SULFATE 1 AMPULE: 2.5; .5 SOLUTION RESPIRATORY (INHALATION) at 08:10

## 2020-01-25 RX ADMIN — AMIODARONE HYDROCHLORIDE 150 MG: 50 INJECTION, SOLUTION INTRAVENOUS at 09:26

## 2020-01-25 RX ADMIN — PANTOPRAZOLE SODIUM 40 MG: 40 TABLET, DELAYED RELEASE ORAL at 09:04

## 2020-01-25 RX ADMIN — INSULIN LISPRO 3 UNITS: 100 INJECTION, SOLUTION INTRAVENOUS; SUBCUTANEOUS at 10:07

## 2020-01-25 RX ADMIN — KETOROLAC TROMETHAMINE 15 MG: 30 INJECTION, SOLUTION INTRAMUSCULAR; INTRAVENOUS at 00:05

## 2020-01-25 RX ADMIN — DIGOXIN 125 MCG: 0.25 INJECTION INTRAMUSCULAR; INTRAVENOUS at 21:29

## 2020-01-25 RX ADMIN — ALBUMIN (HUMAN) 25 G: 0.25 INJECTION, SOLUTION INTRAVENOUS at 15:53

## 2020-01-25 RX ADMIN — ALBUMIN (HUMAN) 12.5 G: 12.5 INJECTION, SOLUTION INTRAVENOUS at 23:45

## 2020-01-25 RX ADMIN — CEFAZOLIN SODIUM 2 G: 2 SOLUTION INTRAVENOUS at 18:30

## 2020-01-25 RX ADMIN — CEFAZOLIN SODIUM 2 G: 2 SOLUTION INTRAVENOUS at 09:04

## 2020-01-25 RX ADMIN — EPINEPHRINE 3 MCG/MIN: 1 INJECTION INTRAMUSCULAR; INTRAVENOUS; SUBCUTANEOUS at 05:07

## 2020-01-25 RX ADMIN — SODIUM CHLORIDE, PRESERVATIVE FREE 10 ML: 5 INJECTION INTRAVENOUS at 09:00

## 2020-01-25 RX ADMIN — KETOROLAC TROMETHAMINE 15 MG: 30 INJECTION, SOLUTION INTRAMUSCULAR; INTRAVENOUS at 21:29

## 2020-01-25 RX ADMIN — MUPIROCIN: 20 OINTMENT TOPICAL at 09:00

## 2020-01-25 RX ADMIN — INSULIN LISPRO 3 UNITS: 100 INJECTION, SOLUTION INTRAVENOUS; SUBCUTANEOUS at 03:45

## 2020-01-25 RX ADMIN — INSULIN LISPRO 9 UNITS: 100 INJECTION, SOLUTION INTRAVENOUS; SUBCUTANEOUS at 17:52

## 2020-01-25 RX ADMIN — MUPIROCIN: 20 OINTMENT TOPICAL at 21:24

## 2020-01-25 RX ADMIN — DOCUSATE SODIUM 100 MG: 100 CAPSULE, LIQUID FILLED ORAL at 21:09

## 2020-01-25 RX ADMIN — FENTANYL CITRATE 25 MCG: 50 INJECTION, SOLUTION INTRAMUSCULAR; INTRAVENOUS at 09:32

## 2020-01-25 RX ADMIN — ASPIRIN 81 MG: 81 TABLET ORAL at 09:01

## 2020-01-25 RX ADMIN — DIGOXIN 250 MCG: 0.25 INJECTION INTRAMUSCULAR; INTRAVENOUS at 22:27

## 2020-01-25 RX ADMIN — ATORVASTATIN CALCIUM 80 MG: 40 TABLET, FILM COATED ORAL at 21:09

## 2020-01-25 RX ADMIN — CLOPIDOGREL 75 MG: 75 TABLET, FILM COATED ORAL at 09:01

## 2020-01-25 RX ADMIN — IPRATROPIUM BROMIDE AND ALBUTEROL SULFATE 1 AMPULE: 2.5; .5 SOLUTION RESPIRATORY (INHALATION) at 15:30

## 2020-01-25 RX ADMIN — ONDANSETRON 4 MG: 2 INJECTION INTRAMUSCULAR; INTRAVENOUS at 12:16

## 2020-01-25 RX ADMIN — SENNOSIDES 8.6 MG: 8.6 TABLET, FILM COATED ORAL at 21:29

## 2020-01-25 ASSESSMENT — PAIN - FUNCTIONAL ASSESSMENT
PAIN_FUNCTIONAL_ASSESSMENT: PREVENTS OR INTERFERES SOME ACTIVE ACTIVITIES AND ADLS
PAIN_FUNCTIONAL_ASSESSMENT: PREVENTS OR INTERFERES SOME ACTIVE ACTIVITIES AND ADLS

## 2020-01-25 ASSESSMENT — PAIN SCALES - GENERAL
PAINLEVEL_OUTOF10: 5
PAINLEVEL_OUTOF10: 4
PAINLEVEL_OUTOF10: 3
PAINLEVEL_OUTOF10: 6
PAINLEVEL_OUTOF10: 6
PAINLEVEL_OUTOF10: 3

## 2020-01-25 ASSESSMENT — PAIN DESCRIPTION - PAIN TYPE
TYPE: SURGICAL PAIN;ACUTE PAIN
TYPE: SURGICAL PAIN;ACUTE PAIN

## 2020-01-25 ASSESSMENT — PAIN DESCRIPTION - PROGRESSION
CLINICAL_PROGRESSION: GRADUALLY IMPROVING
CLINICAL_PROGRESSION: GRADUALLY IMPROVING

## 2020-01-25 ASSESSMENT — PAIN DESCRIPTION - DESCRIPTORS
DESCRIPTORS: SHARP;ACHING
DESCRIPTORS: TENDER;SORE;ACHING

## 2020-01-25 ASSESSMENT — PAIN DESCRIPTION - ONSET
ONSET: ON-GOING
ONSET: ON-GOING

## 2020-01-25 ASSESSMENT — PAIN DESCRIPTION - LOCATION
LOCATION: CHEST;GENERALIZED;INCISION
LOCATION: GENERALIZED;CHEST;INCISION

## 2020-01-25 ASSESSMENT — PAIN DESCRIPTION - FREQUENCY
FREQUENCY: CONTINUOUS
FREQUENCY: CONTINUOUS

## 2020-01-25 NOTE — PLAN OF CARE
improve  Outcome: Ongoing     Problem: Discharge Planning:  Goal: Discharged to appropriate level of care  Outcome: Ongoing     Problem: Gas Exchange - Impaired:  Goal: Levels of oxygenation will improve  Outcome: Ongoing     Problem: Tobacco Use:  Goal: Will participate in inpatient tobacco-use cessation counseling  Outcome: Ongoing     Problem: Preoperative Routine:  Goal: Will comply with preparation for surgery or procedure  1/24/2020 2107 by Terry Busby RN  Outcome: Completed  1/24/2020 2106 by Terry Busby RN  Outcome: Completed     Problem: Pain:  Goal: Control of chronic pain  Outcome: Completed

## 2020-01-25 NOTE — PROGRESS NOTES
Patient went into afib rvr. Dr. Jamari Aguilar and Dr. Violetta Siemens notified. New orders obtained.

## 2020-01-25 NOTE — PLAN OF CARE
Problem: Preoperative Routine:  Goal: Will comply with preparation for surgery or procedure  Outcome: Completed

## 2020-01-25 NOTE — OP NOTE
dissected free from the undersurface of the left chest wall using a  pedicle technique. All branches were clipped and divided. The patient  was heparinized systemically for adequate ACT for bypass. Once this was  completed, the distal aspect of the left internal mammary artery was  clipped and divided. Next, the mammary retractor was removed, sternal  retractor was placed, and the pericardium was opened. The ascending  aorta was palpated and noted to be free of atherosclerotic disease. Suitable areas for cannulation, cross-clamping, and proximal anastomoses  were apparent. Next, the central cannulation was commenced in the  ascending aorta and the right atrium. The antegrade cardioplegia line  as well as aortic root vent were inserted as well. The patient was  placed on cardiopulmonary bypass. The targets were inspected. The LAD  was noted to be adequate for bypass in its mid portion, as well as the  right coronary artery system and the posterior descending artery was  noted to be the best suitable target for this distribution. The  circumflex distribution was inspected at length as well and the obtuse  marginal branches that were visible were severely atretic and not of  adequate size for bypass grafting. Next, the cross-clamp was applied. Heart was arrested with [place] cardioplegia and a single shot  antegrade. First, our attention was turned to the  right coronary  artery system. The posterior descending artery underwent arteriotomy  and an end-to-side vein graft anastomosis was created with a running 7-0  Prolene suture. This when tested had adequate flow and was hemostatic. Next, our attention was turned to the mid LAD and after arteriotomy, an  end-to-side LIMA to LAD anastomosis was created with a running 7-0  Prolene suture. This when tested had excellent runoff into the diagonal  branches as well as the distal LAD. Next, our attention was turned to  the proximal anastomosis.   One

## 2020-01-25 NOTE — PROGRESS NOTES
Date: 1/24/2020    Time: 11:48 PM    Patient Placed On BIPAP/CPAP/ Non-Invasive Ventilation? No    If no must comment. Facial area red/color change? No           If YES are Blister/Lesion present? No   If yes must notify nursing staff  BIPAP/CPAP skin barrier?   Yes    Skin barrier type:mepilex       Comments:pt wearing bipap from previous shift        Leatha Shells

## 2020-01-25 NOTE — FLOWSHEET NOTE
Patient placed on 6L high-flow NC during post-op bath. SpO2 maintained between 92-94%, but dropped to 88% with activity. Resps remain unlabored but shallow. Patient encouraged to take deep breaths and splint incision. Placed back on PAP mask at 50% O2.

## 2020-01-25 NOTE — PROGRESS NOTES
Hospitalist Progress Note      PCP: Yobani Mojica DO    Date of Admission: 1/22/2020      Subjective:     Postop on NC  aox3  No distress  Wants to eat  Got IABP out this am  Has bit chest tightness   He is on epi/amio/nitro/    Medications:  Reviewed    Infusion Medications    amiodarone 450mg/250ml D5W infusion 1 mg/min (01/25/20 0945)    sodium chloride 30 mL/hr (01/25/20 0000)    propofol Stopped (01/24/20 1240)    sodium chloride      norepinephrine      insulin      dextrose      nitroGLYCERIN      EPINEPHrine infusion 1 mcg/min (01/25/20 1000)     Scheduled Medications    sodium chloride flush  10 mL Intravenous 2 times per day    ceFAZolin (ANCEF) IVPB  2 g Intravenous Q8H    docusate sodium  100 mg Oral BID    pantoprazole  40 mg Oral Daily    chlorhexidine  15 mL Mouth/Throat BID    magnesium oxide  400 mg Oral Daily    mupirocin   Nasal BID    aspirin  81 mg Oral Daily    ipratropium-albuterol  1 ampule Inhalation Q4H WA    insulin glargine  0.15 Units/kg Subcutaneous Nightly    clopidogrel  75 mg Oral Daily    insulin lispro  0-18 Units Subcutaneous Q4H    insulin lispro  0-18 Units Subcutaneous Q4H    tamsulosin  0.4 mg Oral Daily    senna  1 tablet Oral Nightly    atorvastatin  80 mg Oral Nightly     PRN Meds: sodium chloride flush, potassium chloride, magnesium sulfate, acetaminophen, acetaminophen, oxyCODONE **OR** oxyCODONE, fentanNYL **OR** fentanNYL, magnesium hydroxide, ondansetron, propofol, albumin human, sodium chloride, norepinephrine, insulin, glucose, dextrose, glucagon (rDNA), dextrose, nitroGLYCERIN, aluminum & magnesium hydroxide-simethicone, calcium gluconate IVPB, ketorolac      Intake/Output Summary (Last 24 hours) at 1/25/2020 1033  Last data filed at 1/25/2020 1000  Gross per 24 hour   Intake 2725 ml   Output 2394 ml   Net 331 ml       Physical Exam Performed:    BP (!) 108/48   Pulse 85   Temp 99.4 °F (37.4 °C) (Oral)   Resp 23   Ht 5' 7\" (1.702 45 Juanita Oconnor 2-5 01/23/2020    BACTERIA NONE 01/23/2020    RBCUA NONE 01/23/2020    BLOODU TRACE-INTACT 01/23/2020    SPECGRAV <=1.005 01/23/2020    GLUCOSEU Negative 01/23/2020       Radiology:  XR CHEST PORTABLE   Final Result   Extubated. Postoperative changes. Two mediastinal drains and left pleural catheter stable in position. Bilateral pleural effusions and atelectasis, worse on the right   compared to the previous study. XR CHEST PORTABLE   Final Result      1. Postoperative changes from recent CABG. 2. Bibasilar opacities, somewhat increased along the left lung base   likely represent atelectasis. Pneumonia cannot be excluded. 3. The life support lines and tubes appear well positioned. US DUP LOWER EXTREMITY MAPPING BILAT VENOUS   Final Result   Mapping of the right and left great saphenous veins as detailed in the   body of the report. US CAROTID ARTERY BILATERAL   Final Result   Spectral Doppler sonographic findings consistent with the presence of   an intra-aortic balloon pump. Mild bilateral carotid atherosclerosis. Estimated stenosis in the right and left internal carotid arteries is   0-49% by NASCET criteria. VL ERROL BILATERAL LIMITED 1-2 LEVELS   Final Result      CT Chest WO Contrast   Final Result      1. Small bilateral pleural effusions and bibasilar atelectasis right   more than left. 2. Aorta and pulmonary arteries have normal size. Mild CAD. 3. Probable few tiny noncalcified gallstones.                XR CHEST PORTABLE   Final Result   Small amount of pleural thickening left costophrenic angle               XR CHEST PORTABLE    (Results Pending)           Assessment/Plan:    Active Hospital Problems    Diagnosis    CAD in native artery [I25.10]    Non-ST elevation MI (NSTEMI) ([address] [I21.4]    Chest pain [R07.9]     66 yo male hx ex smoker, vit d/b12 def, came to hospital with chest pain elevated trop, nstemi and s/p cath showing severe

## 2020-01-26 ENCOUNTER — APPOINTMENT (OUTPATIENT)
Dept: GENERAL RADIOLOGY | Age: 75
DRG: 233 | End: 2020-01-26
Attending: INTERNAL MEDICINE
Payer: MEDICARE

## 2020-01-26 LAB
ANION GAP SERPL CALCULATED.3IONS-SCNC: 17 MMOL/L (ref 7–16)
BLOOD BANK DISPENSE STATUS: NORMAL
BLOOD BANK PRODUCT CODE: NORMAL
BPU ID: NORMAL
BUN BLDV-MCNC: 36 MG/DL (ref 8–23)
CALCIUM SERPL-MCNC: 8.4 MG/DL (ref 8.6–10.2)
CHLORIDE BLD-SCNC: 101 MMOL/L (ref 98–107)
CO2: 18 MMOL/L (ref 22–29)
CREAT SERPL-MCNC: 1.1 MG/DL (ref 0.7–1.2)
DESCRIPTION BLOOD BANK: NORMAL
GFR AFRICAN AMERICAN: >60
GFR NON-AFRICAN AMERICAN: >60 ML/MIN/1.73
GLUCOSE BLD-MCNC: 203 MG/DL (ref 74–99)
HCT VFR BLD CALC: 34.3 % (ref 37–54)
HEMOGLOBIN: 11 G/DL (ref 12.5–16.5)
MAGNESIUM: 2.6 MG/DL (ref 1.6–2.6)
MCH RBC QN AUTO: 28.3 PG (ref 26–35)
MCHC RBC AUTO-ENTMCNC: 32.1 % (ref 32–34.5)
MCV RBC AUTO: 88.2 FL (ref 80–99.9)
METER GLUCOSE: 119 MG/DL (ref 74–99)
METER GLUCOSE: 143 MG/DL (ref 74–99)
METER GLUCOSE: 167 MG/DL (ref 74–99)
METER GLUCOSE: 174 MG/DL (ref 74–99)
METER GLUCOSE: 180 MG/DL (ref 74–99)
METER GLUCOSE: 182 MG/DL (ref 74–99)
PDW BLD-RTO: 13.5 FL (ref 11.5–15)
PLATELET # BLD: 163 E9/L (ref 130–450)
PMV BLD AUTO: 12 FL (ref 7–12)
POTASSIUM SERPL-SCNC: 4.3 MMOL/L (ref 3.5–5)
RBC # BLD: 3.89 E12/L (ref 3.8–5.8)
SODIUM BLD-SCNC: 136 MMOL/L (ref 132–146)
WBC # BLD: 9.8 E9/L (ref 4.5–11.5)

## 2020-01-26 PROCEDURE — 93798 PHYS/QHP OP CAR RHAB W/ECG: CPT

## 2020-01-26 PROCEDURE — 85027 COMPLETE CBC AUTOMATED: CPT

## 2020-01-26 PROCEDURE — 94669 MECHANICAL CHEST WALL OSCILL: CPT

## 2020-01-26 PROCEDURE — 2000000000 HC ICU R&B

## 2020-01-26 PROCEDURE — 6370000000 HC RX 637 (ALT 250 FOR IP): Performed by: THORACIC SURGERY (CARDIOTHORACIC VASCULAR SURGERY)

## 2020-01-26 PROCEDURE — 99233 SBSQ HOSP IP/OBS HIGH 50: CPT | Performed by: INTERNAL MEDICINE

## 2020-01-26 PROCEDURE — 82962 GLUCOSE BLOOD TEST: CPT

## 2020-01-26 PROCEDURE — 6360000002 HC RX W HCPCS: Performed by: THORACIC SURGERY (CARDIOTHORACIC VASCULAR SURGERY)

## 2020-01-26 PROCEDURE — 83735 ASSAY OF MAGNESIUM: CPT

## 2020-01-26 PROCEDURE — 2580000003 HC RX 258: Performed by: INTERNAL MEDICINE

## 2020-01-26 PROCEDURE — 94660 CPAP INITIATION&MGMT: CPT

## 2020-01-26 PROCEDURE — 6360000002 HC RX W HCPCS: Performed by: INTERNAL MEDICINE

## 2020-01-26 PROCEDURE — 2700000000 HC OXYGEN THERAPY PER DAY

## 2020-01-26 PROCEDURE — 71045 X-RAY EXAM CHEST 1 VIEW: CPT

## 2020-01-26 PROCEDURE — 94640 AIRWAY INHALATION TREATMENT: CPT

## 2020-01-26 PROCEDURE — 36415 COLL VENOUS BLD VENIPUNCTURE: CPT

## 2020-01-26 PROCEDURE — 2580000003 HC RX 258: Performed by: THORACIC SURGERY (CARDIOTHORACIC VASCULAR SURGERY)

## 2020-01-26 PROCEDURE — 6370000000 HC RX 637 (ALT 250 FOR IP): Performed by: HOSPITALIST

## 2020-01-26 PROCEDURE — 80048 BASIC METABOLIC PNL TOTAL CA: CPT

## 2020-01-26 RX ORDER — SENNA PLUS 8.6 MG/1
1 TABLET ORAL 2 TIMES DAILY
Status: DISCONTINUED | OUTPATIENT
Start: 2020-01-26 | End: 2020-01-27

## 2020-01-26 RX ADMIN — INSULIN LISPRO 3 UNITS: 100 INJECTION, SOLUTION INTRAVENOUS; SUBCUTANEOUS at 18:49

## 2020-01-26 RX ADMIN — IPRATROPIUM BROMIDE AND ALBUTEROL SULFATE 1 AMPULE: 2.5; .5 SOLUTION RESPIRATORY (INHALATION) at 15:40

## 2020-01-26 RX ADMIN — TAMSULOSIN HYDROCHLORIDE 0.4 MG: 0.4 CAPSULE ORAL at 10:25

## 2020-01-26 RX ADMIN — OXYCODONE 10 MG: 5 TABLET ORAL at 10:24

## 2020-01-26 RX ADMIN — ATORVASTATIN CALCIUM 80 MG: 40 TABLET, FILM COATED ORAL at 20:13

## 2020-01-26 RX ADMIN — IPRATROPIUM BROMIDE AND ALBUTEROL SULFATE 1 AMPULE: 2.5; .5 SOLUTION RESPIRATORY (INHALATION) at 12:44

## 2020-01-26 RX ADMIN — MAGNESIUM GLUCONATE 500 MG ORAL TABLET 400 MG: 500 TABLET ORAL at 09:00

## 2020-01-26 RX ADMIN — SODIUM CHLORIDE, PRESERVATIVE FREE 10 ML: 5 INJECTION INTRAVENOUS at 20:14

## 2020-01-26 RX ADMIN — SODIUM CHLORIDE, PRESERVATIVE FREE 10 ML: 5 INJECTION INTRAVENOUS at 10:25

## 2020-01-26 RX ADMIN — INSULIN LISPRO 3 UNITS: 100 INJECTION, SOLUTION INTRAVENOUS; SUBCUTANEOUS at 05:17

## 2020-01-26 RX ADMIN — SENNOSIDES 8.6 MG: 8.6 TABLET, FILM COATED ORAL at 20:14

## 2020-01-26 RX ADMIN — SODIUM CHLORIDE 30 ML/HR: 9 INJECTION, SOLUTION INTRAVENOUS at 01:55

## 2020-01-26 RX ADMIN — AMIODARONE HYDROCHLORIDE 1 MG/MIN: 50 INJECTION, SOLUTION INTRAVENOUS at 23:56

## 2020-01-26 RX ADMIN — OXYCODONE 5 MG: 5 TABLET ORAL at 20:15

## 2020-01-26 RX ADMIN — IPRATROPIUM BROMIDE AND ALBUTEROL SULFATE 1 AMPULE: 2.5; .5 SOLUTION RESPIRATORY (INHALATION) at 08:16

## 2020-01-26 RX ADMIN — CALCIUM GLUCONATE 1 G: 98 INJECTION, SOLUTION INTRAVENOUS at 07:26

## 2020-01-26 RX ADMIN — ASPIRIN 81 MG: 81 TABLET ORAL at 09:00

## 2020-01-26 RX ADMIN — DOCUSATE SODIUM 100 MG: 100 CAPSULE, LIQUID FILLED ORAL at 20:13

## 2020-01-26 RX ADMIN — MUPIROCIN: 20 OINTMENT TOPICAL at 20:38

## 2020-01-26 RX ADMIN — PANTOPRAZOLE SODIUM 40 MG: 40 TABLET, DELAYED RELEASE ORAL at 10:25

## 2020-01-26 RX ADMIN — DOCUSATE SODIUM 100 MG: 100 CAPSULE, LIQUID FILLED ORAL at 10:25

## 2020-01-26 RX ADMIN — SODIUM CHLORIDE 30 ML: 9 INJECTION, SOLUTION INTRAVENOUS at 20:05

## 2020-01-26 RX ADMIN — MUPIROCIN: 20 OINTMENT TOPICAL at 10:25

## 2020-01-26 RX ADMIN — INSULIN GLARGINE 12 UNITS: 100 INJECTION, SOLUTION SUBCUTANEOUS at 20:38

## 2020-01-26 RX ADMIN — IPRATROPIUM BROMIDE AND ALBUTEROL SULFATE 1 AMPULE: 2.5; .5 SOLUTION RESPIRATORY (INHALATION) at 21:45

## 2020-01-26 RX ADMIN — AMIODARONE HYDROCHLORIDE 150 MG: 50 INJECTION, SOLUTION INTRAVENOUS at 23:56

## 2020-01-26 RX ADMIN — CLOPIDOGREL 75 MG: 75 TABLET, FILM COATED ORAL at 09:00

## 2020-01-26 RX ADMIN — INSULIN LISPRO 3 UNITS: 100 INJECTION, SOLUTION INTRAVENOUS; SUBCUTANEOUS at 01:12

## 2020-01-26 RX ADMIN — INSULIN LISPRO 3 UNITS: 100 INJECTION, SOLUTION INTRAVENOUS; SUBCUTANEOUS at 08:47

## 2020-01-26 RX ADMIN — INSULIN LISPRO 3 UNITS: 100 INJECTION, SOLUTION INTRAVENOUS; SUBCUTANEOUS at 14:50

## 2020-01-26 RX ADMIN — CEFAZOLIN SODIUM 2 G: 2 SOLUTION INTRAVENOUS at 01:55

## 2020-01-26 ASSESSMENT — PAIN SCALES - GENERAL
PAINLEVEL_OUTOF10: 8
PAINLEVEL_OUTOF10: 0
PAINLEVEL_OUTOF10: 5
PAINLEVEL_OUTOF10: 0
PAINLEVEL_OUTOF10: 0

## 2020-01-26 ASSESSMENT — PAIN DESCRIPTION - DESCRIPTORS: DESCRIPTORS: ACHING

## 2020-01-26 ASSESSMENT — PAIN DESCRIPTION - FREQUENCY: FREQUENCY: OTHER (COMMENT)

## 2020-01-26 ASSESSMENT — PAIN DESCRIPTION - LOCATION: LOCATION: CHEST

## 2020-01-26 ASSESSMENT — PAIN DESCRIPTION - PROGRESSION: CLINICAL_PROGRESSION: GRADUALLY WORSENING

## 2020-01-26 ASSESSMENT — PAIN DESCRIPTION - PAIN TYPE: TYPE: SURGICAL PAIN

## 2020-01-26 NOTE — PROGRESS NOTES
Afib 120s - HR improved from yesterday  On Amio gtt  U/o adequate     NAD  CTA  Sternum stable     Wean epi   Cont amio gtt - repeat bolus later if remains in afib   D/C CT today   Consider anticoagulation if afib persists until tomorrow  Keep in ICU today

## 2020-01-26 NOTE — PROGRESS NOTES
Hospitalist Progress Note      PCP: Savannah Laboy DO    Date of Admission: 1/22/2020      Subjective:     Feels good  On NC  On amio gtt for afib/rvr/sr  S/p iv dig prn  Dc CT today  Consider anticoagulation if afib persists until tomorrow    Medications:  Reviewed    Infusion Medications    amiodarone 450mg/250ml D5W infusion 0.5 mg/min (01/25/20 1957)    sodium chloride 30 mL/hr (01/26/20 0155)    sodium chloride      norepinephrine      insulin      dextrose      nitroGLYCERIN      EPINEPHrine infusion 2 mcg/min (01/26/20 0731)     Scheduled Medications    senna  1 tablet Oral BID    sodium chloride flush  10 mL Intravenous 2 times per day    docusate sodium  100 mg Oral BID    pantoprazole  40 mg Oral Daily    magnesium oxide  400 mg Oral Daily    mupirocin   Nasal BID    aspirin  81 mg Oral Daily    ipratropium-albuterol  1 ampule Inhalation Q4H WA    insulin glargine  0.15 Units/kg Subcutaneous Nightly    clopidogrel  75 mg Oral Daily    insulin lispro  0-18 Units Subcutaneous Q4H    tamsulosin  0.4 mg Oral Daily    atorvastatin  80 mg Oral Nightly     PRN Meds: sodium chloride flush, potassium chloride, magnesium sulfate, acetaminophen, acetaminophen, oxyCODONE **OR** oxyCODONE, fentanNYL **OR** fentanNYL, magnesium hydroxide, ondansetron, albumin human, sodium chloride, norepinephrine, insulin, glucose, dextrose, glucagon (rDNA), dextrose, nitroGLYCERIN, aluminum & magnesium hydroxide-simethicone, calcium gluconate IVPB, ketorolac      Intake/Output Summary (Last 24 hours) at 1/26/2020 1013  Last data filed at 1/26/2020 0800  Gross per 24 hour   Intake 1824.87 ml   Output 740 ml   Net 1084.87 ml       Physical Exam Performed:    BP (!) 108/48   Pulse 119   Temp 98.4 °F (36.9 °C) (Temporal)   Resp 25   Ht 5' 7\" (1.702 m)   Wt 182 lb 9.6 oz (82.8 kg)   SpO2 93%   BMI 28.60 kg/m²     General appearance:  No apparent distress, appears stated age and cooperative.   HEENT:  Normal cephalic, atraumatic without obvious deformity. Pupils equal, round, and reactive to light.  Extra ocular muscles intact. Conjunctivae/corneas clear. Neck: Supple, with full range of motion. No jugular venous distention. Trachea midline. Respiratory:  Normal respiratory effort. Clear to auscultation, bilaterally without Rales/Wheezes/Rhonchi. Cardiovascular:  Regular rate and rhythm with normal S1/S2 without murmurs, rubs or gallops. Abdomen: Soft, non-tender, non-distended with normal bowel sounds. Musculoskeletal:  No clubbing, cyanosis or edema bilaterally.  Full range of motion without deformity. Skin: Skin color, texture, turgor normal.  No rashes or lesions. Neurologic:  Neurovascularly intact without any focal sensory/motor deficits. Cranial nerves: II-XII intact, grossly non-focal.  Psychiatric:  Alert and oriented, thought content appropriate, normal insight    Labs:   Recent Labs     01/24/20  1056 01/25/20  0450 01/26/20  0512   WBC 26.7* 23.6* 9.8   HGB 14.0 11.6* 11.0*   HCT 42.8 36.9* 34.3*    173 163     Recent Labs     01/24/20  1056  01/24/20  1511 01/25/20  0450 01/26/20  0512     --   --  141 136   K 4.9   < > 4.68 4.7 4.3     --   --  106 101   CO2 25  --   --  19* 18*   BUN 15  --   --  23 36*   CREATININE 0.8  --   --  1.1 1.1   CALCIUM 8.8  --   --  8.0* 8.4*    < > = values in this interval not displayed. Recent Labs     01/24/20  0445   AST 91*   ALT 44*   BILITOT 0.6   ALKPHOS 60     Recent Labs     01/23/20  1230 01/24/20  1056   INR 1.1 1.1     No results for input(s): Monda Sadalilae in the last 72 hours. Urinalysis:      Lab Results   Component Value Date    NITRU Negative 01/23/2020    WBCUA 2-5 01/23/2020    BACTERIA NONE 01/23/2020    RBCUA NONE 01/23/2020    BLOODU TRACE-INTACT 01/23/2020    SPECGRAV <=1.005 01/23/2020    GLUCOSEU Negative 01/23/2020       Radiology:  XR CHEST PORTABLE   Final Result   Extubated. Postoperative changes.    Two BB/ENTRESTO when able  ppi  Life vest at dc  Postop care  Iv amio-- to po amio per cards-  Iv hep if okay w/ ct sx  CT tube out today    DVT Prophylaxis: scd  Diet: DIET CARDIAC; Carb Control: 5 carbs/meal (approximate 2000 kcals/day)  Dietary Nutrition Supplements: Diabetic Oral Supplement  Code Status: Full Code    PT/OT Eval Status: cvicu    Dispo - ip    Ryan Greenwood MD

## 2020-01-26 NOTE — PROGRESS NOTES
PT REFUSING BIPAP FOR TONIGHT, PT STATES HE IS SWEATING , RN NOTIFIED AND STATED THAT PT HAS A FEVER.

## 2020-01-27 ENCOUNTER — APPOINTMENT (OUTPATIENT)
Dept: GENERAL RADIOLOGY | Age: 75
DRG: 233 | End: 2020-01-27
Attending: INTERNAL MEDICINE
Payer: MEDICARE

## 2020-01-27 LAB
ANION GAP SERPL CALCULATED.3IONS-SCNC: 15 MMOL/L (ref 7–16)
APTT: 33.7 SEC (ref 24.5–35.1)
APTT: 46.8 SEC (ref 24.5–35.1)
APTT: 55.3 SEC (ref 24.5–35.1)
B.E.: -1.4 MMOL/L (ref -3–3)
BUN BLDV-MCNC: 30 MG/DL (ref 8–23)
CALCIUM SERPL-MCNC: 8.7 MG/DL (ref 8.6–10.2)
CHLORIDE BLD-SCNC: 100 MMOL/L (ref 98–107)
CO2: 20 MMOL/L (ref 22–29)
COHB: 0.6 % (ref 0–1.5)
CREAT SERPL-MCNC: 0.9 MG/DL (ref 0.7–1.2)
CRITICAL: ABNORMAL
DATE ANALYZED: ABNORMAL
DATE OF COLLECTION: ABNORMAL
GFR AFRICAN AMERICAN: >60
GFR NON-AFRICAN AMERICAN: >60 ML/MIN/1.73
GLUCOSE BLD-MCNC: 141 MG/DL (ref 74–99)
HCO3: 22.7 MMOL/L (ref 22–26)
HCT VFR BLD CALC: 32.9 % (ref 37–54)
HEMOGLOBIN: 10.6 G/DL (ref 12.5–16.5)
HHB: 5.8 % (ref 0–5)
LAB: ABNORMAL
Lab: ABNORMAL
MAGNESIUM: 2.5 MG/DL (ref 1.6–2.6)
MCH RBC QN AUTO: 28 PG (ref 26–35)
MCHC RBC AUTO-ENTMCNC: 32.2 % (ref 32–34.5)
MCV RBC AUTO: 86.8 FL (ref 80–99.9)
METER GLUCOSE: 129 MG/DL (ref 74–99)
METER GLUCOSE: 141 MG/DL (ref 74–99)
METER GLUCOSE: 151 MG/DL (ref 74–99)
METER GLUCOSE: 158 MG/DL (ref 74–99)
METER GLUCOSE: 176 MG/DL (ref 74–99)
METER GLUCOSE: 215 MG/DL (ref 74–99)
METHB: 0.1 % (ref 0–1.5)
MODE: ABNORMAL
O2 CONTENT: 15.4 ML/DL
O2 SATURATION: 94.2 % (ref 92–98.5)
O2HB: 93.5 % (ref 94–97)
OPERATOR ID: 7278
PATIENT TEMP: 37 C
PCO2: 36.2 MMHG (ref 35–45)
PDW BLD-RTO: 13.3 FL (ref 11.5–15)
PH BLOOD GAS: 7.42 (ref 7.35–7.45)
PLATELET # BLD: 168 E9/L (ref 130–450)
PMV BLD AUTO: 12.4 FL (ref 7–12)
PO2: 71 MMHG (ref 75–100)
POTASSIUM SERPL-SCNC: 4.17 MMOL/L (ref 3.5–5)
POTASSIUM SERPL-SCNC: 4.4 MMOL/L (ref 3.5–5)
POTASSIUM SERPL-SCNC: 4.4 MMOL/L (ref 3.5–5)
RBC # BLD: 3.79 E12/L (ref 3.8–5.8)
SODIUM BLD-SCNC: 135 MMOL/L (ref 132–146)
SOURCE, BLOOD GAS: ABNORMAL
THB: 11.7 G/DL (ref 11.5–16.5)
TIME ANALYZED: 1249
WBC # BLD: 12.3 E9/L (ref 4.5–11.5)

## 2020-01-27 PROCEDURE — 94640 AIRWAY INHALATION TREATMENT: CPT

## 2020-01-27 PROCEDURE — 83735 ASSAY OF MAGNESIUM: CPT

## 2020-01-27 PROCEDURE — 6370000000 HC RX 637 (ALT 250 FOR IP): Performed by: THORACIC SURGERY (CARDIOTHORACIC VASCULAR SURGERY)

## 2020-01-27 PROCEDURE — 6360000002 HC RX W HCPCS: Performed by: THORACIC SURGERY (CARDIOTHORACIC VASCULAR SURGERY)

## 2020-01-27 PROCEDURE — 97162 PT EVAL MOD COMPLEX 30 MIN: CPT

## 2020-01-27 PROCEDURE — 97530 THERAPEUTIC ACTIVITIES: CPT

## 2020-01-27 PROCEDURE — 80048 BASIC METABOLIC PNL TOTAL CA: CPT

## 2020-01-27 PROCEDURE — 85730 THROMBOPLASTIN TIME PARTIAL: CPT

## 2020-01-27 PROCEDURE — 6360000002 HC RX W HCPCS: Performed by: NURSE PRACTITIONER

## 2020-01-27 PROCEDURE — 84132 ASSAY OF SERUM POTASSIUM: CPT

## 2020-01-27 PROCEDURE — 36569 INSJ PICC 5 YR+ W/O IMAGING: CPT

## 2020-01-27 PROCEDURE — 2580000003 HC RX 258: Performed by: THORACIC SURGERY (CARDIOTHORACIC VASCULAR SURGERY)

## 2020-01-27 PROCEDURE — 36592 COLLECT BLOOD FROM PICC: CPT

## 2020-01-27 PROCEDURE — 2000000000 HC ICU R&B

## 2020-01-27 PROCEDURE — 6370000000 HC RX 637 (ALT 250 FOR IP): Performed by: HOSPITALIST

## 2020-01-27 PROCEDURE — 97166 OT EVAL MOD COMPLEX 45 MIN: CPT

## 2020-01-27 PROCEDURE — 97535 SELF CARE MNGMENT TRAINING: CPT

## 2020-01-27 PROCEDURE — 82962 GLUCOSE BLOOD TEST: CPT

## 2020-01-27 PROCEDURE — 2700000000 HC OXYGEN THERAPY PER DAY

## 2020-01-27 PROCEDURE — 36415 COLL VENOUS BLD VENIPUNCTURE: CPT

## 2020-01-27 PROCEDURE — 82805 BLOOD GASES W/O2 SATURATION: CPT

## 2020-01-27 PROCEDURE — 76937 US GUIDE VASCULAR ACCESS: CPT

## 2020-01-27 PROCEDURE — 94660 CPAP INITIATION&MGMT: CPT

## 2020-01-27 PROCEDURE — 71045 X-RAY EXAM CHEST 1 VIEW: CPT

## 2020-01-27 PROCEDURE — 85027 COMPLETE CBC AUTOMATED: CPT

## 2020-01-27 PROCEDURE — C1751 CATH, INF, PER/CENT/MIDLINE: HCPCS

## 2020-01-27 PROCEDURE — 99233 SBSQ HOSP IP/OBS HIGH 50: CPT | Performed by: INTERNAL MEDICINE

## 2020-01-27 RX ORDER — HEPARIN SODIUM (PORCINE) LOCK FLUSH IV SOLN 100 UNIT/ML 100 UNIT/ML
3 SOLUTION INTRAVENOUS PRN
Status: DISCONTINUED | OUTPATIENT
Start: 2020-01-27 | End: 2020-02-03 | Stop reason: HOSPADM

## 2020-01-27 RX ORDER — LIDOCAINE HYDROCHLORIDE 10 MG/ML
5 INJECTION, SOLUTION EPIDURAL; INFILTRATION; INTRACAUDAL; PERINEURAL ONCE
Status: DISCONTINUED | OUTPATIENT
Start: 2020-01-27 | End: 2020-01-29

## 2020-01-27 RX ORDER — SENNA PLUS 8.6 MG/1
2 TABLET ORAL 2 TIMES DAILY
Status: DISCONTINUED | OUTPATIENT
Start: 2020-01-27 | End: 2020-01-30

## 2020-01-27 RX ORDER — HEPARIN SODIUM 10000 [USP'U]/100ML
12 INJECTION, SOLUTION INTRAVENOUS CONTINUOUS
Status: DISCONTINUED | OUTPATIENT
Start: 2020-01-27 | End: 2020-02-03 | Stop reason: HOSPADM

## 2020-01-27 RX ORDER — FUROSEMIDE 10 MG/ML
40 INJECTION INTRAMUSCULAR; INTRAVENOUS 3 TIMES DAILY
Status: COMPLETED | OUTPATIENT
Start: 2020-01-27 | End: 2020-01-27

## 2020-01-27 RX ORDER — HEPARIN SODIUM (PORCINE) LOCK FLUSH IV SOLN 100 UNIT/ML 100 UNIT/ML
3 SOLUTION INTRAVENOUS EVERY 12 HOURS SCHEDULED
Status: DISCONTINUED | OUTPATIENT
Start: 2020-01-27 | End: 2020-02-03 | Stop reason: HOSPADM

## 2020-01-27 RX ORDER — SODIUM CHLORIDE 0.9 % (FLUSH) 0.9 %
10 SYRINGE (ML) INJECTION PRN
Status: DISCONTINUED | OUTPATIENT
Start: 2020-01-27 | End: 2020-02-03 | Stop reason: HOSPADM

## 2020-01-27 RX ORDER — METOPROLOL SUCCINATE 25 MG/1
25 TABLET, EXTENDED RELEASE ORAL DAILY
Status: DISCONTINUED | OUTPATIENT
Start: 2020-01-27 | End: 2020-02-03 | Stop reason: HOSPADM

## 2020-01-27 RX ADMIN — SENNOSIDES 8.6 MG: 8.6 TABLET, FILM COATED ORAL at 08:46

## 2020-01-27 RX ADMIN — IPRATROPIUM BROMIDE AND ALBUTEROL SULFATE 1 AMPULE: 2.5; .5 SOLUTION RESPIRATORY (INHALATION) at 08:01

## 2020-01-27 RX ADMIN — MUPIROCIN: 20 OINTMENT TOPICAL at 08:46

## 2020-01-27 RX ADMIN — SODIUM CHLORIDE, PRESERVATIVE FREE 10 ML: 5 INJECTION INTRAVENOUS at 08:48

## 2020-01-27 RX ADMIN — AMIODARONE HYDROCHLORIDE 1 MG/MIN: 50 INJECTION, SOLUTION INTRAVENOUS at 09:09

## 2020-01-27 RX ADMIN — METOPROLOL SUCCINATE 25 MG: 25 TABLET, EXTENDED RELEASE ORAL at 08:47

## 2020-01-27 RX ADMIN — INSULIN LISPRO 6 UNITS: 100 INJECTION, SOLUTION INTRAVENOUS; SUBCUTANEOUS at 00:28

## 2020-01-27 RX ADMIN — HEPARIN SODIUM 12 UNITS/KG/HR: 10000 INJECTION, SOLUTION INTRAVENOUS at 09:04

## 2020-01-27 RX ADMIN — INSULIN GLARGINE 12 UNITS: 100 INJECTION, SOLUTION SUBCUTANEOUS at 21:14

## 2020-01-27 RX ADMIN — OXYCODONE 10 MG: 5 TABLET ORAL at 05:01

## 2020-01-27 RX ADMIN — MUPIROCIN: 20 OINTMENT TOPICAL at 21:10

## 2020-01-27 RX ADMIN — IPRATROPIUM BROMIDE AND ALBUTEROL SULFATE 1 AMPULE: 2.5; .5 SOLUTION RESPIRATORY (INHALATION) at 11:10

## 2020-01-27 RX ADMIN — FUROSEMIDE 40 MG: 10 INJECTION, SOLUTION INTRAMUSCULAR; INTRAVENOUS at 08:47

## 2020-01-27 RX ADMIN — OXYCODONE 10 MG: 5 TABLET ORAL at 22:12

## 2020-01-27 RX ADMIN — INSULIN LISPRO 3 UNITS: 100 INJECTION, SOLUTION INTRAVENOUS; SUBCUTANEOUS at 12:40

## 2020-01-27 RX ADMIN — FUROSEMIDE 40 MG: 10 INJECTION, SOLUTION INTRAMUSCULAR; INTRAVENOUS at 21:09

## 2020-01-27 RX ADMIN — ASPIRIN 81 MG: 81 TABLET ORAL at 08:46

## 2020-01-27 RX ADMIN — AMIODARONE HYDROCHLORIDE 1 MG/MIN: 50 INJECTION, SOLUTION INTRAVENOUS at 22:11

## 2020-01-27 RX ADMIN — IPRATROPIUM BROMIDE AND ALBUTEROL SULFATE 1 AMPULE: 2.5; .5 SOLUTION RESPIRATORY (INHALATION) at 22:30

## 2020-01-27 RX ADMIN — OXYCODONE 10 MG: 5 TABLET ORAL at 00:12

## 2020-01-27 RX ADMIN — DOCUSATE SODIUM 100 MG: 100 CAPSULE, LIQUID FILLED ORAL at 08:46

## 2020-01-27 RX ADMIN — PANTOPRAZOLE SODIUM 40 MG: 40 TABLET, DELAYED RELEASE ORAL at 08:46

## 2020-01-27 RX ADMIN — POTASSIUM CHLORIDE 20 MEQ: 29.8 INJECTION, SOLUTION INTRAVENOUS at 13:18

## 2020-01-27 RX ADMIN — TAMSULOSIN HYDROCHLORIDE 0.4 MG: 0.4 CAPSULE ORAL at 08:46

## 2020-01-27 RX ADMIN — INSULIN LISPRO 3 UNITS: 100 INJECTION, SOLUTION INTRAVENOUS; SUBCUTANEOUS at 16:33

## 2020-01-27 RX ADMIN — POTASSIUM CHLORIDE 20 MEQ: 29.8 INJECTION, SOLUTION INTRAVENOUS at 14:16

## 2020-01-27 RX ADMIN — MAGNESIUM GLUCONATE 500 MG ORAL TABLET 400 MG: 500 TABLET ORAL at 08:46

## 2020-01-27 RX ADMIN — ATORVASTATIN CALCIUM 80 MG: 40 TABLET, FILM COATED ORAL at 21:09

## 2020-01-27 RX ADMIN — FUROSEMIDE 40 MG: 10 INJECTION, SOLUTION INTRAMUSCULAR; INTRAVENOUS at 13:19

## 2020-01-27 RX ADMIN — DOCUSATE SODIUM 100 MG: 100 CAPSULE, LIQUID FILLED ORAL at 21:09

## 2020-01-27 RX ADMIN — INSULIN LISPRO 3 UNITS: 100 INJECTION, SOLUTION INTRAVENOUS; SUBCUTANEOUS at 21:14

## 2020-01-27 RX ADMIN — IPRATROPIUM BROMIDE AND ALBUTEROL SULFATE 1 AMPULE: 2.5; .5 SOLUTION RESPIRATORY (INHALATION) at 18:10

## 2020-01-27 RX ADMIN — INSULIN LISPRO 3 UNITS: 100 INJECTION, SOLUTION INTRAVENOUS; SUBCUTANEOUS at 08:07

## 2020-01-27 RX ADMIN — SENNOSIDES 17.2 MG: 8.6 TABLET, FILM COATED ORAL at 21:11

## 2020-01-27 RX ADMIN — OXYCODONE 10 MG: 5 TABLET ORAL at 08:47

## 2020-01-27 ASSESSMENT — PAIN DESCRIPTION - DESCRIPTORS
DESCRIPTORS: DISCOMFORT;SORE
DESCRIPTORS: DISCOMFORT;ACHING
DESCRIPTORS: DISCOMFORT;SORE
DESCRIPTORS: ACHING;NAGGING;SORE

## 2020-01-27 ASSESSMENT — PAIN DESCRIPTION - PROGRESSION
CLINICAL_PROGRESSION: GRADUALLY WORSENING
CLINICAL_PROGRESSION: GRADUALLY IMPROVING
CLINICAL_PROGRESSION: NOT CHANGED
CLINICAL_PROGRESSION: NOT CHANGED

## 2020-01-27 ASSESSMENT — PAIN DESCRIPTION - PAIN TYPE
TYPE: SURGICAL PAIN
TYPE: SURGICAL PAIN;ACUTE PAIN

## 2020-01-27 ASSESSMENT — PAIN SCALES - GENERAL
PAINLEVEL_OUTOF10: 0
PAINLEVEL_OUTOF10: 6
PAINLEVEL_OUTOF10: 0
PAINLEVEL_OUTOF10: 7
PAINLEVEL_OUTOF10: 2
PAINLEVEL_OUTOF10: 4
PAINLEVEL_OUTOF10: 0
PAINLEVEL_OUTOF10: 8
PAINLEVEL_OUTOF10: 7
PAINLEVEL_OUTOF10: 0
PAINLEVEL_OUTOF10: 0

## 2020-01-27 ASSESSMENT — PAIN DESCRIPTION - LOCATION
LOCATION: GENERALIZED;CHEST;INCISION
LOCATION: CHEST

## 2020-01-27 ASSESSMENT — PAIN DESCRIPTION - ORIENTATION: ORIENTATION: MID

## 2020-01-27 ASSESSMENT — PAIN DESCRIPTION - ONSET
ONSET: ON-GOING
ONSET: GRADUAL
ONSET: GRADUAL
ONSET: PROGRESSIVE

## 2020-01-27 ASSESSMENT — PAIN DESCRIPTION - FREQUENCY
FREQUENCY: INTERMITTENT
FREQUENCY: CONTINUOUS

## 2020-01-27 ASSESSMENT — PAIN - FUNCTIONAL ASSESSMENT
PAIN_FUNCTIONAL_ASSESSMENT: PREVENTS OR INTERFERES SOME ACTIVE ACTIVITIES AND ADLS

## 2020-01-27 NOTE — PROGRESS NOTES
PICC Placement 1/27/2020    Product number: [id number]   Lot Number: [id number]      Ultrasound: yes   L Basilic      Upper Arm Circumference: 33cm    Size: 5f    Exposed Length: 3cm    Internal Length: 47cm   Cut: 0   Vein Measurement: .55cm    Kathya Mckeon  1/27/2020  12:36 PM                    Atrial Fib   Xray required

## 2020-01-27 NOTE — PROGRESS NOTES
OCCUPATIONAL THERAPY INITIAL EVALUATION      Date:2020  Patient Name: Rossy Langston  MRN: [id number]  : 1945  Room: [address] [place] [address]A      Evaluating OT: Marry Priest OTR/L [id number]    AM-PAC Daily Activity Raw Score:     Recommended Adaptive Equipment:  LB dressing AE, shower seat, raised commode as needed    Comments: Based on patient's functional performance as documented below and prior level of function, patient would benefit from continued skilled OT during/following hospital stay in an effort to increase functional safety, independence with ADLS/IADLS, and overall quality of life. Diagnosis: CAD; NSTEMI  Surgery:  CORONARY ARTERY BYPASS x2 (LIMA-LAD, SVG-PDA), LLE EVH, KAMI      Pertinent Medical History: hernia, MI, Afib/RVR    Precautions:  Falls, sternal, O2, arterial line     Home Living: Pt lives with wife  in a 2 floor plan with threshold  to enter and no rail(s); bed/bath on 2nd floor; pt reports his bed will be moved down to first floor. Full bath available on first floor. Bathroom setup: tub/shower; standard commode height   Equipment owned: no DME    Prior Level of Function: IND with ADLs;  IND with IADLs. No devce for ambulation.    Driving: yes  Occupation: retired; works out at [employer]; stays active    Pain Level: pt c/o 0/10 chest pain at rest; increasing with episode of coughing and mobiltiy this session- does not rate      Cognition: A&O: 4/4    Follows 1-2 step commands appropriately   Memory: G   Comprehension G   Problem solving: G; min cues to maintain precautions during transfers   Judgement/safety: G               Communication skills: WFL           Vision: WFL               Glasses:readers                                                   Hearing: WFL     RASS: 0  CAM-ICU: (NT) Delirium    UE Assessment:  Hand Dominance: Right [x]  Left []     ROM Strength STM goal: PRN   RUE  Grossly WFL within precautions Not formally tested; grossly WFL              WNL for ADLS LUE Grossly WFL within precautions Not formally tested; grossly WFL              WNL for ADLS       Sensation: No c/o numbness or tingling in extremities   Tone: WNL   Edema: [facility]     Functional Assessment   Initial Eval Status  Date: 1/27 Treatment Status  Date: Short Term Goals  Treatment frequency: PRN 1-3 tx/wk   Feeding S; set up                          IND   while seated up in chair to increase activity tolerance        Grooming Mod A  after set up                          Mod I   while standing sink level requiring no assist for balance and demonstrating G tolerance      UB dressing/bathing Max A                          Mod I    demonstrating G knowledge of precautions during tasks     LB dressing/bathing Dep    Max A after instruct on AE; reacher and sock aid segundo/doff socks  *pt interested in AE for ADLs                          Mod I   using AE as needed for safe reach/ energy conservation       Toileting NT                          Mod I     Bed Mobility  Supine to sit: NT    Sit to supine: Max A+2                         Min A  in prep of ADL tasks & transfers   Functional Transfers Sit to stand: Max A from lower chair surface    Stand to sit: Min A onto higher bed surface                        Mod I  sit<>stand/functional bathroom transfers using AD/DME as needed for balance and safety   Functional Mobility Mod A no device;   short shuffled steps due to LE weakness                        Mod I   functional/bathroom mobility using AD as needed & demonstrating G safety     Balance Sitting:     Static:  SBA    Dynamic:Min A  Standing:  Mod A     Chel dynamic sitting balance; Chel dynamic standing balance  during ADL tasks & transfers   Endurance/Activity Tolerance   F tolerance with moderate activity     G   tolerance with moderate activity/self care routine   Visual/  Perceptual               WFL                            Vitals:   HR at rest: 125 bpm HR at end of session: 116 bpm   Spo2 at rest:92% Spo2 at end of session 91%   BP at rest:124/57 mmHg BP at end of session 120/54 mmHg     Comments/Treatment: OK from RN to see patient. Upon arrival, patient found sitting up in chair eating breakfast.   Pt agreeable to session. Pt instructed on sternal precautions regarding ADLs and functional transfers prior to mobility. Pt perfomred functional transfer from chair; mobility in collaboration with PT. Pt instructed on breathing techniques throughout session due to min SOB. Pt then assisted to bedside chair and educated on energy conservation and sternal precautions regarding LB ADLs. Pt practiced using AE. Pt demo G follow through; required short breaks for breathing. At end of session, Pt returned to bed per nursing request for procedure. Pt with no complaints. All devices within reach, all lines and tubes intact. Pt provided with handouts regarding maintaining sternal precautions during functional activities for safe return home. Line management and environmental modifications made prior to and end of session to ensure patient safety and increased efficiency of treatment session. Eval Complexity: Moderate    · History: Expanded chart review of consults, imaging, and psychosocial history related to current functional performance. · Exam: 5+ performance deficits identified limiting functional independence and safe return home   · Assistance/Modification: Min/mod assistance or modifications required to perform tasks. May have comorbidities that affect occupational performance.     Assessment of current deficits:   Functional mobility [x]  ADLs [x] Strength [x]  Cognition []  Functional transfers  [x] IADLs [x] Safety Awareness [x]  Endurance [x]  Fine Motor Coordination [] Balance [x] Vision/perception [] Sensation []   Gross Motor Coordination [] ROM [x] Delirium []                  Communication []    Plan of Care:  ADL retraining [x]   Equipment needs [x]   Neuromuscular re-education [] Energy Conservation Techniques [x]  Functional Transfer training [x] Patient and/or Family Education [x]  Functional Mobility training [x]  Environmental Modifications [x]  Cognitive re-training []   Compensatory techniques for ADLs [x]  Splinting Needs []   Positioning to improve overall function [x]   Therapeutic Activity [x]                       Therapeutic Exercise  [x]  Visual/Perceptual: []    Delirium prevention/treatment  [x]   Other:  []    Rehab Potential: good for established goals    Patient / Family Goal: to get stronger before discharge home    Patient and/or family were instructed/educated on diagnosis, prognosis/goals and plan of care. Pt demonstrated G understanding. [] Malnutrition indicators have been identified and nursing has been notified to ensure a dietitian consult is ordered.       Moderate Complexity Evaluation + 40 min timed tx    Tx time in: 1000  Tx time out: 2178 Chris Rocha, OTR/L [id number]

## 2020-01-27 NOTE — PROGRESS NOTES
Date: 1/27/2020    Time: 8:13 AM    Patient Placed On BIPAP/CPAP/ Non-Invasive Ventilation?   No    Comments:  BIPAP in room on standby ,, patient currently on [address] ([place] [address])

## 2020-01-27 NOTE — PLAN OF CARE
pain  Description  Control of acute pain  Outcome: Ongoing     Problem:  Activity Intolerance:  Goal: Able to perform prescribed physical activity  Description  Able to perform prescribed physical activity  Outcome: Met This Shift  Goal: Ability to tolerate increased activity will improve  Description  Ability to tolerate increased activity will improve  Outcome: Met This Shift     Problem: Fluid Volume - Imbalance:  Goal: Ability to achieve a balanced intake and output will improve  Description  Ability to achieve a balanced intake and output will improve  Outcome: Met This Shift  Goal: Chest tube drainage is within specified parameters  Description  Chest tube drainage is within specified parameters  Outcome: Completed     Problem: Gas Exchange - Impaired:  Goal: Levels of oxygenation will improve  Description  Levels of oxygenation will improve  Outcome: Ongoing     Problem: Tissue Perfusion - Cardiopulmonary, Altered:  Goal: Absence of angina  Description  Absence of angina  Outcome: Met This Shift  Goal: Hemodynamic stability will improve  Description  Hemodynamic stability will improve  Outcome: Met This Shift  Goal: Will show no evidence of cardiac arrhythmias  Description  Will show no evidence of cardiac arrhythmias  Outcome: Met This Shift     Problem: Tobacco Use:  Goal: Will participate in inpatient tobacco-use cessation counseling  Description  Will participate in inpatient tobacco-use cessation counseling  Outcome: Ongoing

## 2020-01-27 NOTE — PLAN OF CARE
Problem: Cardiac:  Goal: Ability to maintain an adequate cardiac output will improve  Description  Ability to maintain an adequate cardiac output will improve  1/27/2020 0749 by Adelina Alcocer RN  Outcome: Met This Shift  Note:   Monitor ekg adm meds as ordered monitor vital signs  1/27/2020 0118 by Zen Corrales RN  Outcome: Met This Shift     Problem: Gas Exchange - Impaired:  Goal: Levels of oxygenation will improve  Description  Levels of oxygenation will improve  1/27/2020 0749 by Adelina Alcocer RN  Note:   Monitor sao2 and cxr encourage pt to cough and deep breathe instruct pt on smi and need for resp treatments  1/27/2020 0118 by Zen Corrales RN  Outcome: Ongoing

## 2020-01-27 NOTE — PROGRESS NOTES
mL  10 mL Intravenous 2 times per day Lori Nguyen, DO   10 mL at 01/27/20 0848    sodium chloride flush 0.9 % injection 10 mL  10 mL Intravenous PRN Lori Nguyen DO        potassium chloride 20 mEq/50 mL IVPB (Central Line)  20 mEq Intravenous PRN Lori Nguyen DO        magnesium sulfate 2 g in 50 mL IVPB premix  2 g Intravenous PRN Lori Nguyen, DO        acetaminophen (TYLENOL) tablet 650 mg  650 mg Oral Q4H PRN Lori Nguyen, DO        acetaminophen (TYLENOL) suppository 650 mg  650 mg Rectal Q4H PRN Lori Nguyen DO   650 mg at 01/25/20 2109    oxyCODONE (ROXICODONE) immediate release tablet 5 mg  5 mg Oral Q4H PRN Lori Nguyen DO   5 mg at 01/26/20 2015    Or    oxyCODONE (ROXICODONE) immediate release tablet 10 mg  10 mg Oral Q4H PRN Lori Nguyen, DO   10 mg at 01/27/20 0847    fentaNYL (SUBLIMAZE) injection 25 mcg  25 mcg Intravenous Q1H PRN Lori Nguyen DO   25 mcg at 01/25/20 0932    Or    fentaNYL (SUBLIMAZE) injection 50 mcg  50 mcg Intravenous Q1H PRN Lori Nguyen DO   50 mcg at 01/24/20 1259    docusate sodium (COLACE) capsule 100 mg  100 mg Oral BID Lori Nguyen DO   100 mg at 01/27/20 0846    magnesium hydroxide (MILK OF MAGNESIA) 400 MG/5ML suspension 30 mL  30 mL Oral Daily PRN Lori Nguyen DO        ondansetron [facility]) injection 4 mg  4 mg Intravenous Q8H PRN Lori Nguyen DO   4 mg at 01/25/20 1216    pantoprazole (PROTONIX) tablet 40 mg  40 mg Oral Daily Lori Nguyen DO   40 mg at 01/27/20 0846    magnesium oxide (MAG-OX) tablet 400 mg  400 mg Oral Daily Lori Nguyen DO   400 mg at 01/27/20 0846    mupirocin (BACTROBAN) 2 % ointment   Nasal BID Lori Nguyen DO        aspirin EC tablet 81 mg  81 mg Oral Daily Darshan Packer, DO   81 mg at 01/27/20 0846    ipratropium-albuterol (DUONEB) nebulizer solution 1 ampule  1 ampule Inhalation Q4H WA Darshan Packer DO   1 ampule at 01/27/20 0801    albumin human 5 % IV solution 25 g  25 g Intravenous PRN Margorie Fan

## 2020-01-27 NOTE — PROGRESS NOTES
Hospitalist Progress Note      PCP: Gracie Squires DO    Date of Admission: 1/22/2020      Subjective:     Feels good  Still refusing bipap  On NC  No bm yet      Medications:  Reviewed    Infusion Medications    heparin (porcine) 12 Units/kg/hr (01/27/20 0904)    amiodarone 450mg/250ml D5W infusion 1 mg/min (01/27/20 0909)    amiodarone 450mg/250ml D5W infusion 1 mg/min (01/26/20 2356)    sodium chloride 30 mL (01/26/20 2005)    sodium chloride      dextrose       Scheduled Medications    metoprolol succinate  25 mg Oral Daily    furosemide  40 mg Intravenous TID    lidocaine PF  5 mL Intradermal Once    heparin flush  3 mL Intravenous 2 times per day    senna  1 tablet Oral BID    sodium chloride flush  10 mL Intravenous 2 times per day    docusate sodium  100 mg Oral BID    pantoprazole  40 mg Oral Daily    magnesium oxide  400 mg Oral Daily    mupirocin   Nasal BID    aspirin  81 mg Oral Daily    ipratropium-albuterol  1 ampule Inhalation Q4H WA    insulin glargine  0.15 Units/kg Subcutaneous Nightly    insulin lispro  0-18 Units Subcutaneous Q4H    tamsulosin  0.4 mg Oral Daily    atorvastatin  80 mg Oral Nightly     PRN Meds: sodium chloride flush, heparin flush, sodium chloride flush, potassium chloride, magnesium sulfate, acetaminophen, acetaminophen, oxyCODONE **OR** oxyCODONE, fentanNYL **OR** fentanNYL, magnesium hydroxide, ondansetron, albumin human, sodium chloride, glucose, dextrose, glucagon (rDNA), dextrose, aluminum & magnesium hydroxide-simethicone, calcium gluconate IVPB, ketorolac      Intake/Output Summary (Last 24 hours) at 1/27/2020 1014  Last data filed at 1/27/2020 0900  Gross per 24 hour   Intake 2105.3 ml   Output 865 ml   Net 1240.3 ml       Physical Exam Performed:    BP (!) 122/50   Pulse 116   Temp 98.2 °F (36.8 °C) (Temporal)   Resp 26   Ht 5' 7\" (1.702 m)   Wt 182 lb 6.4 oz (82.7 kg)   SpO2 92%   BMI 28.57 kg/m²     General appearance:  No apparent distress, appears stated age and cooperative. HEENT:  Normal cephalic, atraumatic without obvious deformity. Pupils equal, round, and reactive to light.  Extra ocular muscles intact. Conjunctivae/corneas clear. Neck: Supple, with full range of motion. No jugular venous distention. Trachea midline. Respiratory:  Normal respiratory effort. Clear to auscultation, bilaterally without Rales/Wheezes/Rhonchi. Cardiovascular:  Regular rate and rhythm with normal S1/S2 without murmurs, rubs or gallops. Abdomen: Soft, non-tender, non-distended with normal bowel sounds. Musculoskeletal:  No clubbing, cyanosis or edema bilaterally.  Full range of motion without deformity. Skin: Skin color, texture, turgor normal.  No rashes or lesions. Neurologic:  Neurovascularly intact without any focal sensory/motor deficits. Cranial nerves: II-XII intact, grossly non-focal.  Psychiatric:  Alert and oriented, thought content appropriate, normal insight    Labs:   Recent Labs     01/25/20  0450 01/26/20  0512 01/27/20  0430   WBC 23.6* 9.8 12.3*   HGB 11.6* 11.0* 10.6*   HCT 36.9* 34.3* 32.9*    163 168     Recent Labs     01/25/20  0450 01/26/20  0512 01/27/20  0430    136 135   K 4.7 4.3 4.4    101 100   CO2 19* 18* 20*   BUN 23 36* 30*   CREATININE 1.1 1.1 0.9   CALCIUM 8.0* 8.4* 8.7     No results for input(s): AST, ALT, BILIDIR, BILITOT, ALKPHOS in the last 72 hours. Recent Labs     01/24/20  1056   INR 1.1     No results for input(s): Juan Pablo Jared in the last 72 hours. Urinalysis:      Lab Results   Component Value Date    NITRU Negative 01/23/2020    WBCUA 2-5 01/23/2020    BACTERIA NONE 01/23/2020    RBCUA NONE 01/23/2020    BLOODU TRACE-INTACT 01/23/2020    SPECGRAV <=1.005 01/23/2020    GLUCOSEU Negative 01/23/2020       Radiology:  XR CHEST PORTABLE   Final Result   Stable postoperative chest with  CHF.             XR CHEST PORTABLE   Final Result   Bibasilar atelectasis with a small left-sided

## 2020-01-27 NOTE — PROGRESS NOTES
CVICU Progress Note    Name: Joe Zurita  MRN: [id number]    CC: Postoperative Critical Care Management     Indication for Surgery/Procedure: CAD, Mild MR  LVEF:  20% preop; 40-45% postop on IABP/ inotropic support   RVF:  Normal      Important/Relevant PMH/PSH: hernia repair, former smoker (quit~15years ago), GERD     Procedure/Surgeries:  1/24/2020 CABGx2 (LIMA-LAD), (SVG-PDA), pre-op IABP      Pacing wires: Ventricular         Intake/Output Summary (Last 24 hours) at 1/27/2020 0802  Last data filed at 1/27/2020 0700  Gross per 24 hour   Intake 1675.3 ml   Output 875 ml   Net 800.3 ml       Recent Labs     01/25/20  0450 01/26/20  0512 01/27/20  0430   WBC 23.6* 9.8 12.3*   HGB 11.6* 11.0* 10.6*   HCT 36.9* 34.3* 32.9*    163 168      Lab Results   Component Value Date     01/27/2020    K 4.4 01/27/2020    K 4.4 01/24/2020     01/27/2020    CO2 20 01/27/2020    BUN 30 01/27/2020    CREATININE 0.9 01/27/2020    GLUCOSE 141 01/27/2020    CALCIUM 8.7 01/27/2020      Recent Labs     01/25/20  0450 01/26/20  0512 01/27/20  0430   BUN 23 36* 30*   CREATININE 1.1 1.1 0.9       Physical Exam:    BP (!) 108/48   Pulse 108   Temp 98.1 °F (36.7 °C) (Oral)   Resp 25   Ht 5' 7\" (1.702 m)   Wt 182 lb 6.4 oz (82.7 kg)   SpO2 94%   BMI 28.57 kg/m²       CXR Findings: 1/27/2020       FINDINGS:   There is cardiomegaly. Postoperative changes are identified in the   chest. Right IJ catheter is unchanged. There is vascular congestion   with perihilar and bibasilar infiltrates and pleural effusions. There   is no pneumothorax. --- CXR personally viewed and interpreted by ICU Nurse Practitioner, agree with above findings     General: Awake, alert. Eyes: PERRL, anicteric   Pulmonary: Diminished bibasilar.  No wheezes, no accessory muscle use noted on 8L HFNC  Cardiovascular:  IRR, no heaves or thrills on palpation  Tele: Afib   Abdomen: Soft, nontender, + BS   Extremities: Palpable pulses all extremities, no edema   Neurologic/Psych: A&Ox3, CASTREJON to command   Skin: Warm and dry  Incisions: GIGI dressing intact, LLE SVG sites well approximated, left groin dressing intact       Assessment/Plan: POD #3  1. CAD S/p CABGx2 (LIMA-LAD), (SVG-PDA)  -ASA (Plavix stopped), Lipitor, toprol XL   -PT/OT  -PICC Ordered     2. Acute Postoperative Respiratory Insufficiency  -2/2 hypoxia; On 8L HF NC, CXR congested with bilateral pleural effusions--40mg LasixX3 today   -Ezpap, IS, nebs, PT/OT     3. ICM  -LV estimated at 20% preop, improved with IABP to 40-45%  -IABP removed POD1  -Epi off 1/26  -Started on toprol XL, diuresis  -Will need life vest prior to discharge per cardiology (ordered)       4. Atrial fibrillation   - Developed 1/25. Remains Afib on Amio infusion at 1mg/min (VVI back up at 30bpm). Has received two amio boluses and Digoxin.   -Started on toprol XL today   -Heparin infusion per nomogram to be started.      Dispo: CVICU until oxygenation improves     Electronically signed by SATHYA Mcfadden - CNP on 1/27/2020 at 8:02 AM

## 2020-01-27 NOTE — PROGRESS NOTES
precautions and PLB prior to activity. Pt was instructed on use of momentum to complete transfers from lower surfaces. Pt required increased assistance to stand from chair and was mildly unsteady once standing. Unsteadiness continued into ambulation with cues to decrease speed to improve balance. Pt fatigued and was returned to sitting in chair for a rest break. RN requested pt return to bed for IV placement. Slightly less assistance required for second sit to stand but still MaxA. Pt was assisted to supine and repositioned to comfort. All needs met and call light in reach. Will progress as tolerated. All lines remained intact. Patient education  Pt educated on safety    Patient response to education:   Pt verbalized understanding Pt demonstrated skill Pt requires further education in this area   x partial x     Pts/ family goals   1. Return home    Patient and or family understand(s) diagnosis, prognosis, and plan of care. PLAN  PT care will be provided in accordance with the objectives noted above. Whenever appropriate, clear delegation orders will be provided for nursing staff. Exercises and functional mobility practice will be used as well as appropriate assistive devices or modalities to obtain goals. Patient and family education will also be administered as needed. Frequency of treatments will be 2-5x/week as able.     Time in: 1000  Time out: 9[address] [place], [place]  [id number]

## 2020-01-28 ENCOUNTER — APPOINTMENT (OUTPATIENT)
Dept: GENERAL RADIOLOGY | Age: 75
DRG: 233 | End: 2020-01-28
Attending: INTERNAL MEDICINE
Payer: MEDICARE

## 2020-01-28 PROBLEM — I25.5 ISCHEMIC CARDIOMYOPATHY: Status: ACTIVE | Noted: 2020-01-28

## 2020-01-28 PROBLEM — I48.91 ATRIAL FIBRILLATION WITH RVR (HCC): Status: ACTIVE | Noted: 2020-01-28

## 2020-01-28 LAB
ANION GAP SERPL CALCULATED.3IONS-SCNC: 10 MMOL/L (ref 7–16)
APTT: 61.2 SEC (ref 24.5–35.1)
BUN BLDV-MCNC: 30 MG/DL (ref 8–23)
CALCIUM SERPL-MCNC: 8.7 MG/DL (ref 8.6–10.2)
CHLORIDE BLD-SCNC: 99 MMOL/L (ref 98–107)
CO2: 28 MMOL/L (ref 22–29)
CREAT SERPL-MCNC: 0.9 MG/DL (ref 0.7–1.2)
GFR AFRICAN AMERICAN: >60
GFR NON-AFRICAN AMERICAN: >60 ML/MIN/1.73
GLUCOSE BLD-MCNC: 82 MG/DL (ref 74–99)
HCT VFR BLD CALC: 32.4 % (ref 37–54)
HEMOGLOBIN: 10.6 G/DL (ref 12.5–16.5)
MAGNESIUM: 2.4 MG/DL (ref 1.6–2.6)
MCH RBC QN AUTO: 28.6 PG (ref 26–35)
MCHC RBC AUTO-ENTMCNC: 32.7 % (ref 32–34.5)
MCV RBC AUTO: 87.3 FL (ref 80–99.9)
METER GLUCOSE: 129 MG/DL (ref 74–99)
METER GLUCOSE: 137 MG/DL (ref 74–99)
METER GLUCOSE: 144 MG/DL (ref 74–99)
METER GLUCOSE: 145 MG/DL (ref 74–99)
METER GLUCOSE: 166 MG/DL (ref 74–99)
METER GLUCOSE: 72 MG/DL (ref 74–99)
PDW BLD-RTO: 13.5 FL (ref 11.5–15)
PLATELET # BLD: 243 E9/L (ref 130–450)
PMV BLD AUTO: 11.8 FL (ref 7–12)
POTASSIUM SERPL-SCNC: 4.2 MMOL/L (ref 3.5–5)
RBC # BLD: 3.71 E12/L (ref 3.8–5.8)
SODIUM BLD-SCNC: 137 MMOL/L (ref 132–146)
WBC # BLD: 17 E9/L (ref 4.5–11.5)

## 2020-01-28 PROCEDURE — 97535 SELF CARE MNGMENT TRAINING: CPT

## 2020-01-28 PROCEDURE — 2140000000 HC CCU INTERMEDIATE R&B

## 2020-01-28 PROCEDURE — 97530 THERAPEUTIC ACTIVITIES: CPT

## 2020-01-28 PROCEDURE — 2580000003 HC RX 258: Performed by: NURSE PRACTITIONER

## 2020-01-28 PROCEDURE — 99233 SBSQ HOSP IP/OBS HIGH 50: CPT | Performed by: INTERNAL MEDICINE

## 2020-01-28 PROCEDURE — 6370000000 HC RX 637 (ALT 250 FOR IP): Performed by: NURSE PRACTITIONER

## 2020-01-28 PROCEDURE — 82962 GLUCOSE BLOOD TEST: CPT

## 2020-01-28 PROCEDURE — 6370000000 HC RX 637 (ALT 250 FOR IP): Performed by: THORACIC SURGERY (CARDIOTHORACIC VASCULAR SURGERY)

## 2020-01-28 PROCEDURE — 83735 ASSAY OF MAGNESIUM: CPT

## 2020-01-28 PROCEDURE — 6370000000 HC RX 637 (ALT 250 FOR IP): Performed by: HOSPITALIST

## 2020-01-28 PROCEDURE — 6360000002 HC RX W HCPCS: Performed by: THORACIC SURGERY (CARDIOTHORACIC VASCULAR SURGERY)

## 2020-01-28 PROCEDURE — 6360000002 HC RX W HCPCS: Performed by: NURSE PRACTITIONER

## 2020-01-28 PROCEDURE — 85027 COMPLETE CBC AUTOMATED: CPT

## 2020-01-28 PROCEDURE — 80048 BASIC METABOLIC PNL TOTAL CA: CPT

## 2020-01-28 PROCEDURE — 94640 AIRWAY INHALATION TREATMENT: CPT

## 2020-01-28 PROCEDURE — 94669 MECHANICAL CHEST WALL OSCILL: CPT

## 2020-01-28 PROCEDURE — 36592 COLLECT BLOOD FROM PICC: CPT

## 2020-01-28 PROCEDURE — 71045 X-RAY EXAM CHEST 1 VIEW: CPT

## 2020-01-28 PROCEDURE — 94660 CPAP INITIATION&MGMT: CPT

## 2020-01-28 PROCEDURE — 2580000003 HC RX 258: Performed by: THORACIC SURGERY (CARDIOTHORACIC VASCULAR SURGERY)

## 2020-01-28 PROCEDURE — 93798 PHYS/QHP OP CAR RHAB W/ECG: CPT

## 2020-01-28 PROCEDURE — 2700000000 HC OXYGEN THERAPY PER DAY

## 2020-01-28 PROCEDURE — 36415 COLL VENOUS BLD VENIPUNCTURE: CPT

## 2020-01-28 PROCEDURE — 85730 THROMBOPLASTIN TIME PARTIAL: CPT

## 2020-01-28 RX ORDER — AMIODARONE HYDROCHLORIDE 200 MG/1
400 TABLET ORAL 2 TIMES DAILY
Status: DISCONTINUED | OUTPATIENT
Start: 2020-01-28 | End: 2020-02-03 | Stop reason: HOSPADM

## 2020-01-28 RX ORDER — FUROSEMIDE 10 MG/ML
40 INJECTION INTRAMUSCULAR; INTRAVENOUS 3 TIMES DAILY
Status: COMPLETED | OUTPATIENT
Start: 2020-01-28 | End: 2020-01-28

## 2020-01-28 RX ADMIN — PIPERACILLIN AND TAZOBACTAM 3.38 G: 3; .375 INJECTION, POWDER, LYOPHILIZED, FOR SOLUTION INTRAVENOUS at 18:25

## 2020-01-28 RX ADMIN — METOPROLOL SUCCINATE 25 MG: 25 TABLET, EXTENDED RELEASE ORAL at 08:54

## 2020-01-28 RX ADMIN — SODIUM CHLORIDE 25 ML: 9 INJECTION, SOLUTION INTRAVENOUS at 13:41

## 2020-01-28 RX ADMIN — SODIUM CHLORIDE 25 ML: 9 INJECTION, SOLUTION INTRAVENOUS at 21:49

## 2020-01-28 RX ADMIN — FUROSEMIDE 40 MG: 10 INJECTION, SOLUTION INTRAMUSCULAR; INTRAVENOUS at 14:33

## 2020-01-28 RX ADMIN — MUPIROCIN: 20 OINTMENT TOPICAL at 08:54

## 2020-01-28 RX ADMIN — ATORVASTATIN CALCIUM 80 MG: 40 TABLET, FILM COATED ORAL at 21:42

## 2020-01-28 RX ADMIN — DOCUSATE SODIUM 100 MG: 100 CAPSULE, LIQUID FILLED ORAL at 08:53

## 2020-01-28 RX ADMIN — HEPARIN SODIUM 14 UNITS/KG/HR: 10000 INJECTION, SOLUTION INTRAVENOUS at 23:56

## 2020-01-28 RX ADMIN — IPRATROPIUM BROMIDE AND ALBUTEROL SULFATE 1 AMPULE: 2.5; .5 SOLUTION RESPIRATORY (INHALATION) at 16:43

## 2020-01-28 RX ADMIN — INSULIN LISPRO 3 UNITS: 100 INJECTION, SOLUTION INTRAVENOUS; SUBCUTANEOUS at 00:59

## 2020-01-28 RX ADMIN — SENNOSIDES 17.2 MG: 8.6 TABLET, FILM COATED ORAL at 21:42

## 2020-01-28 RX ADMIN — FUROSEMIDE 40 MG: 10 INJECTION, SOLUTION INTRAMUSCULAR; INTRAVENOUS at 08:54

## 2020-01-28 RX ADMIN — IPRATROPIUM BROMIDE AND ALBUTEROL SULFATE 1 AMPULE: 2.5; .5 SOLUTION RESPIRATORY (INHALATION) at 07:38

## 2020-01-28 RX ADMIN — INSULIN LISPRO 3 UNITS: 100 INJECTION, SOLUTION INTRAVENOUS; SUBCUTANEOUS at 12:49

## 2020-01-28 RX ADMIN — AMIODARONE HYDROCHLORIDE 1 MG/MIN: 50 INJECTION, SOLUTION INTRAVENOUS at 05:46

## 2020-01-28 RX ADMIN — FUROSEMIDE 40 MG: 10 INJECTION, SOLUTION INTRAMUSCULAR; INTRAVENOUS at 21:42

## 2020-01-28 RX ADMIN — TAMSULOSIN HYDROCHLORIDE 0.4 MG: 0.4 CAPSULE ORAL at 10:44

## 2020-01-28 RX ADMIN — INSULIN GLARGINE 12 UNITS: 100 INJECTION, SOLUTION SUBCUTANEOUS at 21:46

## 2020-01-28 RX ADMIN — AMIODARONE HYDROCHLORIDE 400 MG: 200 TABLET ORAL at 21:43

## 2020-01-28 RX ADMIN — PANTOPRAZOLE SODIUM 40 MG: 40 TABLET, DELAYED RELEASE ORAL at 08:53

## 2020-01-28 RX ADMIN — INSULIN LISPRO 3 UNITS: 100 INJECTION, SOLUTION INTRAVENOUS; SUBCUTANEOUS at 18:26

## 2020-01-28 RX ADMIN — MUPIROCIN: 20 OINTMENT TOPICAL at 21:50

## 2020-01-28 RX ADMIN — IPRATROPIUM BROMIDE AND ALBUTEROL SULFATE 1 AMPULE: 2.5; .5 SOLUTION RESPIRATORY (INHALATION) at 11:35

## 2020-01-28 RX ADMIN — MAGNESIUM GLUCONATE 500 MG ORAL TABLET 400 MG: 500 TABLET ORAL at 08:52

## 2020-01-28 RX ADMIN — PIPERACILLIN AND TAZOBACTAM 3.38 G: 3; .375 INJECTION, POWDER, LYOPHILIZED, FOR SOLUTION INTRAVENOUS at 10:09

## 2020-01-28 RX ADMIN — SENNOSIDES 17.2 MG: 8.6 TABLET, FILM COATED ORAL at 08:53

## 2020-01-28 RX ADMIN — HEPARIN SODIUM 14.03 UNITS/KG/HR: 10000 INJECTION, SOLUTION INTRAVENOUS at 01:01

## 2020-01-28 RX ADMIN — DOCUSATE SODIUM 100 MG: 100 CAPSULE, LIQUID FILLED ORAL at 21:42

## 2020-01-28 RX ADMIN — ASPIRIN 81 MG: 81 TABLET ORAL at 08:52

## 2020-01-28 RX ADMIN — OXYCODONE 5 MG: 5 TABLET ORAL at 08:52

## 2020-01-28 RX ADMIN — AMIODARONE HYDROCHLORIDE 400 MG: 200 TABLET ORAL at 08:51

## 2020-01-28 ASSESSMENT — PAIN SCALES - GENERAL
PAINLEVEL_OUTOF10: 0
PAINLEVEL_OUTOF10: 5
PAINLEVEL_OUTOF10: 0
PAINLEVEL_OUTOF10: 5

## 2020-01-28 ASSESSMENT — PULMONARY FUNCTION TESTS: PIF_VALUE: 14

## 2020-01-28 ASSESSMENT — PAIN DESCRIPTION - FREQUENCY: FREQUENCY: INTERMITTENT

## 2020-01-28 ASSESSMENT — PAIN DESCRIPTION - PAIN TYPE: TYPE: SURGICAL PAIN

## 2020-01-28 ASSESSMENT — PAIN DESCRIPTION - LOCATION: LOCATION: GENERALIZED

## 2020-01-28 ASSESSMENT — PAIN DESCRIPTION - ONSET: ONSET: GRADUAL

## 2020-01-28 ASSESSMENT — PAIN DESCRIPTION - DESCRIPTORS: DESCRIPTORS: ACHING;DISCOMFORT

## 2020-01-28 ASSESSMENT — PAIN DESCRIPTION - PROGRESSION: CLINICAL_PROGRESSION: GRADUALLY WORSENING

## 2020-01-28 NOTE — PROGRESS NOTES
Hospitalist Progress Note      PCP: Destinee Wallace DO    Date of Admission: 1/22/2020      Subjective:   -- Patinet seen and examinedm chart reviewed   -- Remains in CVICU at this time   -- No complaints Voiced   -- Remains on Heparin / Amiodarone Gtt  -- Remains in NSR at this time     Medications:  Reviewed    Infusion Medications    heparin (porcine) 14.027 Units/kg/hr (01/28/20 0101)    amiodarone 450mg/250ml D5W infusion 1 mg/min (01/28/20 0546)    amiodarone 450mg/250ml D5W infusion 1 mg/min (01/26/20 2356)    sodium chloride 30 mL (01/26/20 2005)    sodium chloride      dextrose       Scheduled Medications    metoprolol succinate  25 mg Oral Daily    lidocaine PF  5 mL Intradermal Once    heparin flush  3 mL Intravenous 2 times per day    senna  2 tablet Oral BID    sodium chloride flush  10 mL Intravenous 2 times per day    docusate sodium  100 mg Oral BID    pantoprazole  40 mg Oral Daily    magnesium oxide  400 mg Oral Daily    mupirocin   Nasal BID    aspirin  81 mg Oral Daily    ipratropium-albuterol  1 ampule Inhalation Q4H WA    insulin glargine  0.15 Units/kg Subcutaneous Nightly    insulin lispro  0-18 Units Subcutaneous Q4H    tamsulosin  0.4 mg Oral Daily    atorvastatin  80 mg Oral Nightly     PRN Meds: sodium chloride flush, heparin flush, sodium chloride flush, potassium chloride, magnesium sulfate, acetaminophen, acetaminophen, oxyCODONE **OR** oxyCODONE, fentanNYL **OR** fentanNYL, magnesium hydroxide, ondansetron, albumin human, sodium chloride, glucose, dextrose, glucagon (rDNA), dextrose, aluminum & magnesium hydroxide-simethicone, calcium gluconate IVPB      Intake/Output Summary (Last 24 hours) at 1/28/2020 0735  Last data filed at 1/28/2020 0700  Gross per 24 hour   Intake 2233 ml   Output 2200 ml   Net 33 ml       Physical Exam Performed:    BP (!) 141/35   Pulse 101   Temp 98.2 °F (36.8 °C) (Oral)   Resp (!) 32   Ht 5' 7\" (1.702 m)   Wt 194 lb 10.7 oz (88.3 kg)   SpO2 92%   BMI 30.49 kg/m²     General appearance:  No apparent distress, appears stated age and cooperative. Resting in CVICU when seen   HEENT:  Normal cephalic, atraumatic without obvious deformity. Neck: Supple, with full range of motion. No jugular venous distention. Trachea midline. Respiratory:  Normal respiratory effort. Clear to auscultation, bilaterally without Rales/Wheezes/Rhonchi. Cardiovascular:  Regular rate and rhythm with normal S1/S2 without murmurs, rubs or gallops. Abdomen: Soft, non-tender, non-distended with normal bowel sounds. Musculoskeletal:  No clubbing, cyanosis or edema bilaterally.  Full range of motion without deformity. Skin: Skin color, texture, turgor normal.  No rashes or lesions. Neurologic:  Neurovascularly intact without any focal sensory/motor deficits. Cranial nerves: II-XII intact, grossly non-focal.  Psychiatric:  Alert and oriented, thought content appropriate, normal insight    Labs:   Recent Labs     01/26/20  0512 01/27/20  0430 01/28/20  0505   WBC 9.8 12.3* 17.0*   HGB 11.0* 10.6* 10.6*   HCT 34.3* 32.9* 32.4*    168 243     Recent Labs     01/26/20  0512 01/27/20  0430 01/27/20  1249 01/27/20  1900 01/28/20  0505    135  --   --  137   K 4.3 4.4 4.17 4.4 4.2    100  --   --  99   CO2 18* 20*  --   --  28   BUN 36* 30*  --   --  30*   CREATININE 1.1 0.9  --   --  0.9   CALCIUM 8.4* 8.7  --   --  8.7     No results for input(s): AST, ALT, BILIDIR, BILITOT, ALKPHOS in the last 72 hours. No results for input(s): INR in the last 72 hours. No results for input(s): Manford Gambell in the last 72 hours.     Urinalysis:      Lab Results   Component Value Date    NITRU Negative 01/23/2020    WBCUA 2-5 01/23/2020    BACTERIA NONE 01/23/2020    RBCUA NONE 01/23/2020    BLOODU TRACE-INTACT 01/23/2020    SPECGRAV <=1.005 01/23/2020    GLUCOSEU Negative 01/23/2020       Radiology:  XR CHEST PORTABLE   Final Result   Interval placement of a left-sided PICC with the tip   overlying the cavoatrial junction. Otherwise no significant change   from previous exam.                     XR CHEST PORTABLE   Final Result   Stable postoperative chest with  CHF. XR CHEST PORTABLE   Final Result   Bibasilar atelectasis with a small left-sided effusion, but no   evidence of cardiac enlargement or decompensation. Stable and uncomplicated appearance of support measures. XR CHEST PORTABLE   Final Result   Extubated. Postoperative changes. Two mediastinal drains and left pleural catheter stable in position. Bilateral pleural effusions and atelectasis, worse on the right   compared to the previous study. XR CHEST PORTABLE   Final Result      1. Postoperative changes from recent CABG. 2. Bibasilar opacities, somewhat increased along the left lung base   likely represent atelectasis. Pneumonia cannot be excluded. 3. The life support lines and tubes appear well positioned. US DUP LOWER EXTREMITY MAPPING BILAT VENOUS   Final Result   Mapping of the right and left great saphenous veins as detailed in the   body of the report. US CAROTID ARTERY BILATERAL   Final Result   Spectral Doppler sonographic findings consistent with the presence of   an intra-aortic balloon pump. Mild bilateral carotid atherosclerosis. Estimated stenosis in the right and left internal carotid arteries is   0-49% by NASCET criteria. VL ERROL BILATERAL LIMITED 1-2 LEVELS   Final Result      CT Chest WO Contrast   Final Result      1. Small bilateral pleural effusions and bibasilar atelectasis right   more than left. 2. Aorta and pulmonary arteries have normal size. Mild CAD. 3. Probable few tiny noncalcified gallstones.                XR CHEST PORTABLE   Final Result   Small amount of pleural thickening left costophrenic angle               XR CHEST PORTABLE    (Results Pending)           Assessment/Plan:    Active Hospital

## 2020-01-28 NOTE — PROGRESS NOTES
[id number] [facility] Cardiology Inpatient Progress Note    Patient is a 67 y.o. male of Yael Capellan DO seen in hospital follow up. Chief complaint: CAD-CABG/Afib/CMP    HPI: Some SOB. No CP.     Patient Active Problem List   Diagnosis    CAD in native artery    Non-ST elevation MI (NSTEMI) ([facility] [address]    Chest pain    Ischemic cardiomyopathy    Atrial fibrillation with RVR (HCC)       Allergies   Allergen Reactions    Tetanus Toxoids      Flu like symptoms       Current Facility-Administered Medications   Medication Dose Route Frequency Provider Last Rate Last Dose    furosemide (LASIX) injection 40 mg  40 mg Intravenous TID Shu Na, APRN - CNP        amiodarone (CORDARONE) tablet 400 mg  400 mg Oral BID Shu Na, APRN - CNP        piperacillin-tazobactam (ZOSYN) 3.375 g in dextrose 5 % 100 mL IVPB extended infusion (mini-bag)  3.375 g Intravenous Q8H Shu Na, APRN - CNP        And    0.9 % sodium chloride infusion admixture  25 mL Intravenous Q8H Shu Na, APRN - CNP        metoprolol succinate (TOPROL XL) extended release tablet 25 mg  25 mg Oral Daily Shu Na, APRN - CNP   25 mg at 01/27/20 0847    lidocaine PF 1 % injection 5 mL  5 mL Intradermal Once Shu Na, APRN - CNP        sodium chloride flush 0.9 % injection 10 mL  10 mL Intravenous PRN Shu Na, APRN - CNP        heparin flush 100 UNIT/ML injection 300 Units  3 mL Intravenous 2 times per day Shu Na, APRN - CNP        heparin flush 100 UNIT/ML injection 300 Units  3 mL Intracatheter PRN Shu Na, APRN - CNP        heparin 25,000 units in dextrose 5% 250 mL infusion  12 Units/kg/hr Intravenous Continuous Shu Na, APRN - CNP 11.6 mL/hr at 01/28/20 0800 14 Units/kg/hr at 01/28/20 0800    senna (SENOKOT) tablet 17.2 mg  2 tablet Oral BID Flako Valenzuela MD   17.2 mg at 01/27/20 2111    0.9 % sodium chloride infusion  30 mL/hr Intravenous Continuous Aparicio Yelena DO 30 mL/hr at 01/26/20 2005 30 mL at 01/26/20 2005    sodium chloride flush 0.9 % injection 10 mL  10 mL Intravenous 2 times per day Jerson Alt, DO   10 mL at 01/27/20 0848    sodium chloride flush 0.9 % injection 10 mL  10 mL Intravenous PRN Jerson Alt, DO        potassium chloride 20 mEq/50 mL IVPB (Central Line)  20 mEq Intravenous PRN Jerson Alt, DO   Stopped at 01/27/20 1935    magnesium sulfate 2 g in 50 mL IVPB premix  2 g Intravenous PRN Jerson Alt, DO        acetaminophen (TYLENOL) tablet 650 mg  650 mg Oral Q4H PRN Jerson Alt, DO        acetaminophen (TYLENOL) suppository 650 mg  650 mg Rectal Q4H PRN Jerson Alt, DO   650 mg at 01/25/20 2109    oxyCODONE (ROXICODONE) immediate release tablet 5 mg  5 mg Oral Q4H PRN Jerson Alt, DO   5 mg at 01/26/20 2015    Or    oxyCODONE (ROXICODONE) immediate release tablet 10 mg  10 mg Oral Q4H PRN Jerson Alt, DO   10 mg at 01/27/20 2212    fentaNYL (SUBLIMAZE) injection 25 mcg  25 mcg Intravenous Q1H PRN Jerson Alt, DO   25 mcg at 01/25/20 0932    Or    fentaNYL (SUBLIMAZE) injection 50 mcg  50 mcg Intravenous Q1H PRN Jerson Alt, DO   50 mcg at 01/24/20 1259    docusate sodium (COLACE) capsule 100 mg  100 mg Oral BID Jerson Alt, DO   100 mg at 01/27/20 2109    magnesium hydroxide (MILK OF MAGNESIA) 400 MG/5ML suspension 30 mL  30 mL Oral Daily PRN Jerson Alt, DO        ondansetron [facility]) injection 4 mg  4 mg Intravenous Q8H PRN Jerson Alt, DO   4 mg at 01/25/20 1216    pantoprazole (PROTONIX) tablet 40 mg  40 mg Oral Daily Jerson Alt, DO   40 mg at 01/27/20 0846    magnesium oxide (MAG-OX) tablet 400 mg  400 mg Oral Daily Jerson Alt, DO   400 mg at 01/27/20 0846    mupirocin (BACTROBAN) 2 % ointment   Nasal BID Jerson Alt, DO        aspirin EC tablet 81 mg  81 mg Oral Daily Darshan Packer DO   81 mg at 01/27/20 0846    ipratropium-albuterol (DUONEB) nebulizer solution 1 ampule  1 ampule Inhalation Q4H HOA Lan Well developed. Head: Normocephalic and atraumatic. Neck: Neck supple. No hepatojugular reflux. No JVD present. Carotid bruit is not present. No tracheal deviation present. No thyromegaly present. Cardiovascular: Normal rate, regular rhythm, normal heart sounds. intact distal pulses. No gallop and no friction rub. No murmur heard. Pulmonary: Breath sounds normal. No respiratory distress. No wheezes. No rales. Chest: Effort normal. No tenderness. Abdominal: Soft. Bowel sounds are normal. No distension or mass. No tenderness, rebound or guarding. Musculoskeletal: . No tenderness. No clubbing or cyanosis. Extremitites: Intact distal pulses. No edema  Neurological: Alert and oriented to person, place, and time. Skin: Skin is warm and dry. No rash noted. Not diaphoretic. No erythema. Psychiatric: Normal mood and affect. Behavior is normal.   Lymphadenopathy: No cervical adenopathy. No groin adenopathy.       CBC:   Lab Results   Component Value Date    WBC 17.0 01/28/2020    RBC 3.71 01/28/2020    HGB 10.6 01/28/2020    HCT 32.4 01/28/2020    MCV 87.3 01/28/2020    MCH 28.6 01/28/2020    MCHC 32.7 01/28/2020    RDW 13.5 01/28/2020     01/28/2020    MPV 11.8 01/28/2020     BMP:   Lab Results   Component Value Date     01/28/2020    K 4.2 01/28/2020    K 4.4 01/24/2020    CL 99 01/28/2020    CO2 28 01/28/2020    BUN 30 01/28/2020    LABALBU 3.5 01/24/2020    CREATININE 0.9 01/28/2020    CALCIUM 8.7 01/28/2020    GFRAA >60 01/28/2020    LABGLOM >60 01/28/2020     Magnesium:    Lab Results   Component Value Date    MG 2.4 01/28/2020     Cardiac Enzymes:   Lab Results   Component Value Date    TROPONINI 6.45 (H) 01/23/2020      PT/INR:    Lab Results   Component Value Date    PROTIME 12.7 01/24/2020    INR 1.1 01/24/2020     TSH:  No results found for: TSH    Rhythm Strip: NSR    ASSESSMENT & PLAN:    Patient Active Problem List   Diagnosis    CAD in native artery    Non-ST elevation MI (NSTEMI) ([facility] [address]    Chest pain    Ischemic cardiomyopathy    Atrial fibrillation with RVR ([facility] [address]     1. CAD-CABG: status post two-vessel CABG with LIMA to LAD and SVG to PDA     Supportive care transitioning to guideline based therapy for his CAD and CMP. 2. CMP: Guideline medications as able. Lifevest on discharge. 3. PAF: In sinus. CHADVASC score at least 5. Will need anticoagulation to prevent CVA. Amio. On IV heparin. Rut López D.O.   Cardiologist  Cardiology, [facility]

## 2020-01-28 NOTE — PROGRESS NOTES
Physical Therapy  Facility/Department: [facility] 3NE CVIC  Daily Treatment Note  NAME: Dameon Penaloza  : 1945  MRN: [id number]    Date of Service: 2020   Evaluating Therapist: Rangel Warner PT, DPT     ROOM #: [id number]  DIAGNOSIS: CAD  PRECAUTIONS: Falls, sternal precautions, external pacer, arterial line, O2  PMHx: MI  PROCEDURES:  CABG x 2     Social:  Pt lives with wife in a 2 floor plan, 1st floor setup with level entry. Prior to admission pt walked with no device and was Independent. Pt reported being very active.       Initial Evaluation  Date: 20 Treatment  Date: 20    Short Term/ Long Term   Goals   AM-PAC 6 Clicks  75/74      Does pt have pain?  No c/o pain at rest  No c/o pain at rest     Bed Mobility  Rolling: MaxA  Supine to sit: NT  Sit to supine: MaxA x 2  Scooting: NT Rolling: NT  Supine to sit: NT  Sit to supine: ModA  Scooting: NT Callum   Transfers Sit to stand: MaxA   Stand to sit: MaxA  Stand pivot: ModA no device Sit to stand: ModA   Stand to sit: ModA  Stand pivot: ModA no device Independent   Ambulation   60 feet with ModA with no device 80 feet with ModA with no device >400 feet Independently   Stair negotiation: ascended and descended NT NT >4 steps with 1 rail with Mod Independent   BLE ROM WNL       BLE strength Grossly 4/5   Increase by 1/3 MMT grade   Balance Sitting: Callum  Standing: ModA no device Sitting: SBA  Standing: ModA no device Sitting: Independent  Standing: Independent      Vitals:  Heart Rate at rest 74 bpm Heart Rate post session 99 bpm   SpO2 at rest 92% SpO2 post session 93%   Blood Pressure at rest 134/52 mmHg Blood Pressure post session 143/64 mmHg      Functional Status Score-Intensive Care Unit (FSS-ICU)   Rolling -/7   Supine to sit transfer -/7   Unsupported sitting  5/7   Sit to stand transfers 3/7   Ambulation 2/   Total  10/35      Pt is alert and oriented x 4  CAM-ICU: NT  RASS: 0  Sensation: WNL  Edema: WNL    Patient education  Pt

## 2020-01-28 NOTE — PROGRESS NOTES
· Current Body Wt: 194 lb (88 kg)(1/28 bed scale)  · Admission Body Wt: 183 lb (83 kg)(1/23 bed scale)  · Usual Body Wt: (UTO)  · % Weight Change:  wt elevated since admit w/ current fluids + at this time.  unable to assess longterm wt changes d/t lack of wt hx per EMR  · Ideal Body Wt: 148 lb (67.1 kg), % Ideal Body 123%  · BMI Classification: BMI 30.0 - 34.9 Obese Class I    Nutrition Interventions:   Continue current diet, Continue current ONS, Start ONS(Add Mitch BID)  Continued Inpatient Monitoring, Education not appropriate at this time, Coordination of Care(pt status d/w RN)    Nutrition Evaluation:   · Evaluation: Goals set   · Goals: Pt to consume >75% meals/ONS    · Monitoring: Meal Intake, Supplement Intake, Diet Tolerance, Skin Integrity, Wound Healing, I&O, Weight, Pertinent Labs, Monitor Bowel Function      Electronically signed by Albert Adler, MS, RD, LD on 1/28/20 at 4:17 PM    Contact Number: [phone]

## 2020-01-28 NOTE — PLAN OF CARE
ventilation will improve  Outcome: Ongoing     Problem: Discharge Planning:  Goal: Discharged to appropriate level of care  Outcome: Ongoing     Problem: Gas Exchange - Impaired:  Goal: Levels of oxygenation will improve  Outcome: Ongoing     Problem: Tobacco Use:  Goal: Will participate in inpatient tobacco-use cessation counseling  Outcome: Ongoing     Problem: Cardiac:  Goal: Diagnostic test results will improve  Outcome: Completed

## 2020-01-28 NOTE — PROGRESS NOTES
OT BEDSIDE TREATMENT NOTE      Date:2020  Patient Name: Haroon Sanders  MRN: [id number]  : 1945  Room: [facility]      Evaluating OT: Abdi Keating OTR/L [id number]     AM-PAC Daily Activity Raw Score:      Recommended Adaptive Equipment:  LB dressing AE, shower seat, raised commode as needed     Comments: Based on patient's functional performance as documented below and prior level of function, patient would benefit from continued skilled OT during/following hospital stay in an effort to increase functional safety, independence with ADLS/IADLS, and overall quality of life.     Diagnosis: CAD; NSTEMI  Surgery:  CORONARY ARTERY BYPASS x2 (LIMA-LAD, SVG-PDA), LLE EVH, KAMI      Pertinent Medical History: hernia, MI, Afib/RVR     Precautions:  Falls, sternal, 6L O2, arterial line     Home Living: Pt lives with wife  in a 2 floor plan with threshold  to enter and no rail(s); bed/bath on 2nd floor; pt reports his bed will be moved down to first floor. Full bath available on first floor. Bathroom setup: tub/shower; standard commode height   Equipment owned: no DME     Prior Level of Function: IND with ADLs;  IND with IADLs. No devce for ambulation. Driving: yes  Occupation: retired; works out at [employer]; stays active     Pain Level: pt c/o 0/10 chest pain today      Cognition: A&O: 4/4    Follows 1-2 step commands appropriately              Memory: Fair- min confusion regarding precautions              Comprehension F              Problem solving: F; min cues to maintain precautions during transfers              Judgement/safety: F                 Communication skills: WFL              [facility] [employer] [facility]                     Glasses:readers                                                        Hearing: WFL                RASS: 0  CAM-ICU: (NT) Delirium     UE Assessment:  Hand Dominance: Right [x]?   Left []?       ROM Strength STM goal: PRN   RUE  Grossly WFL within precautions Not formally tested; grossly WFL              WNL for ADLS      LUE Grossly WFL within precautions Not formally tested; grossly WFL              WNL for ADLS         Sensation: No c/o numbness or tingling in extremities   Tone: WNL   Edema: [facility]     Functional Assessment    Initial Eval Status  Date: 1/27 Treatment Status  Date:1/28 Short Term Goals  Treatment frequency: PRN 1-3 tx/wk   Feeding S; set up  IND  Seated up in chair; goal met. IND   while seated up in chair to increase activity tolerance         Grooming Mod A  after set up Min A  set up; seated up in chair to wash face, comb hair                         Mod I   while standing sink level requiring no assist for balance and demonstrating G tolerance       UB dressing/bathing Max A  NT                         Mod I    demonstrating G knowledge of precautions during tasks      LB dressing/bathing Dep     Max A after instruct on AE; reacher and sock aid segundo/doff socks  *pt interested in AE for ADLs Mod A  after instruction- using AE for safe reach within precautions. Activity performed while seated up in chair. Pt required short breaks for PLB.                           Mod I   using AE as needed for safe reach/ energy conservation        Toileting NT  NT                         Mod I      Bed Mobility  Supine to sit: NT     Sit to supine: Max A+2  Sit to supine: Mod A                        Min A  in prep of ADL tasks & transfers   Functional Transfers Sit to stand:  Max A from lower chair surface     Stand to sit: Min A onto higher bed surface Sit to stand from [employer] A    Stand to sit onto elevated bed: Min A                        Mod I  sit<>stand/functional bathroom transfers using AD/DME as needed for balance and safety   Functional Mobility Mod A no device;   short shuffled steps due to LE weakness  Mod A   no device; unsteady                       Mod I   functional/bathroom mobility using AD as needed & demonstrating G safety      Balance Sitting: Static:  SBA    Dynamic:Min A  Standing: Mod A  Sitting: SBA  Dynamic-Min A    Standing: Mod A    Chel dynamic sitting balance; Chel dynamic standing balance  during ADL tasks & transfers   Endurance/Activity Tolerance    F tolerance with moderate activity Fair with moderate activity; short breaks for breathing techniques     G   tolerance with moderate activity/self care routine   Visual/  Perceptual               WFL                                       Vitals:   HR at rest: 72 bpm HR at end of session: 72 bpm   BP at rest: 134/52 mmHg BP at end of session: 143/64 mmHg   Spo2 at rest:93% Spo2 at end of session: 93%          Comments: Upon arrival pt found sitting up in chair with no complaints. Pt agreeable to session. Pt presenting with min confusion; decreased recall of sternal precautions. Pt educated on restrictions regarding ADLS and transfers. Pt performed functional mobility ex with improved activity tolerance; unsteady gait. Pt instructed on breathing techniques due to SOB throughout session. Pt returned to chair and educated on using reacher and sock aid to segundo/doff socks/pants to maintain precautions during LB ADLs. Pt demo improved use of AE. Pt can benefit from further balance training and education on energy conservation to improve functional performance prior to discharge home. At end of session pt returned to bed per pt request for rest. Pt positioned for comfort. All lines and tubes intact, call light within reach. · Pt has made good progress towards set goals.    · Continue with current plan of care    Time in: 3370  Time out:0935  Total Tx Time: 40 min    Brad Centeno OTR/L 0012

## 2020-01-28 NOTE — PROGRESS NOTES
Date: 1/28/2020    Time: 12:09 AM    Patient Placed On BIPAP/CPAP/ Non-Invasive Ventilation? No    If no must comment. Facial area red/color change? No           If YES are Blister/Lesion present? No   If yes must notify nursing staff  BIPAP/CPAP skin barrier?   Yes    Skin barrier type:mepilex       Comments:    Pt wanted off bipap, placed on [id number] will continue to monitor    Deborra Bleak

## 2020-01-28 NOTE — CARE COORDINATION
Pod #4 s/p cabg x2. o2 being weaned, currently on  prn bipap or 6L. Visited room, but pt asleep and no family in room. Per nursing pt having some confusion today. VM left with wife to discuss transition of care planning. Also noted plan for Life vest at discharge. Referral made to [employer] life vest rep. Await return call from wife for  discharge planning. 12:30   Received return call from wife. She hasn't been able to visit the past 2 days d/t illness. Per wife, cherry feels pt will need to go to [facility] before returning home. Bedroom is on 2nd floor with st flight of 17 steps. Bathroom on 1st floor. Wife purchased a lift chair for him and is requesting a rx from  for power lift chair. She found a program that will assist with cost of  motor aspect of chair with Rx for chair and diagnosis for need. She requests Avi list for [facility] as she has vision problems and has to rely on others to transport her. States son is visiting now. I gave son Avi list for [facility]. Also discussed [facility] in the event that pt can return home. She chose [employer] [employer]. Referral made to Quinten Karimi at [place]. Wife and children will review [facility] list and give 3 choices.

## 2020-01-29 ENCOUNTER — APPOINTMENT (OUTPATIENT)
Dept: GENERAL RADIOLOGY | Age: 75
DRG: 233 | End: 2020-01-29
Attending: INTERNAL MEDICINE
Payer: MEDICARE

## 2020-01-29 LAB
ANION GAP SERPL CALCULATED.3IONS-SCNC: 15 MMOL/L (ref 7–16)
APTT: 67.2 SEC (ref 24.5–35.1)
BUN BLDV-MCNC: 29 MG/DL (ref 8–23)
CALCIUM SERPL-MCNC: 8.4 MG/DL (ref 8.6–10.2)
CHLORIDE BLD-SCNC: 96 MMOL/L (ref 98–107)
CO2: 26 MMOL/L (ref 22–29)
CREAT SERPL-MCNC: 0.9 MG/DL (ref 0.7–1.2)
GFR AFRICAN AMERICAN: >60
GFR NON-AFRICAN AMERICAN: >60 ML/MIN/1.73
GLUCOSE BLD-MCNC: 150 MG/DL (ref 74–99)
HCT VFR BLD CALC: 30 % (ref 37–54)
HEMOGLOBIN: 9.8 G/DL (ref 12.5–16.5)
MAGNESIUM: 2.2 MG/DL (ref 1.6–2.6)
MCH RBC QN AUTO: 28.2 PG (ref 26–35)
MCHC RBC AUTO-ENTMCNC: 32.7 % (ref 32–34.5)
MCV RBC AUTO: 86.2 FL (ref 80–99.9)
METER GLUCOSE: 133 MG/DL (ref 74–99)
METER GLUCOSE: 141 MG/DL (ref 74–99)
METER GLUCOSE: 143 MG/DL (ref 74–99)
METER GLUCOSE: 148 MG/DL (ref 74–99)
METER GLUCOSE: 157 MG/DL (ref 74–99)
PDW BLD-RTO: 13.6 FL (ref 11.5–15)
PLATELET # BLD: 277 E9/L (ref 130–450)
PMV BLD AUTO: 12.1 FL (ref 7–12)
POTASSIUM SERPL-SCNC: 3.4 MMOL/L (ref 3.5–5)
POTASSIUM SERPL-SCNC: 3.7 MMOL/L (ref 3.5–5)
RBC # BLD: 3.48 E12/L (ref 3.8–5.8)
SODIUM BLD-SCNC: 137 MMOL/L (ref 132–146)
WBC # BLD: 16.4 E9/L (ref 4.5–11.5)

## 2020-01-29 PROCEDURE — 2140000000 HC CCU INTERMEDIATE R&B

## 2020-01-29 PROCEDURE — 84132 ASSAY OF SERUM POTASSIUM: CPT

## 2020-01-29 PROCEDURE — 6360000002 HC RX W HCPCS: Performed by: NURSE PRACTITIONER

## 2020-01-29 PROCEDURE — 6370000000 HC RX 637 (ALT 250 FOR IP): Performed by: THORACIC SURGERY (CARDIOTHORACIC VASCULAR SURGERY)

## 2020-01-29 PROCEDURE — 82962 GLUCOSE BLOOD TEST: CPT

## 2020-01-29 PROCEDURE — 99233 SBSQ HOSP IP/OBS HIGH 50: CPT | Performed by: INTERNAL MEDICINE

## 2020-01-29 PROCEDURE — 83735 ASSAY OF MAGNESIUM: CPT

## 2020-01-29 PROCEDURE — 87077 CULTURE AEROBIC IDENTIFY: CPT

## 2020-01-29 PROCEDURE — 85027 COMPLETE CBC AUTOMATED: CPT

## 2020-01-29 PROCEDURE — 93798 PHYS/QHP OP CAR RHAB W/ECG: CPT

## 2020-01-29 PROCEDURE — 71045 X-RAY EXAM CHEST 1 VIEW: CPT

## 2020-01-29 PROCEDURE — 94660 CPAP INITIATION&MGMT: CPT

## 2020-01-29 PROCEDURE — 6360000002 HC RX W HCPCS: Performed by: CLINICAL NURSE SPECIALIST

## 2020-01-29 PROCEDURE — 6370000000 HC RX 637 (ALT 250 FOR IP): Performed by: INTERNAL MEDICINE

## 2020-01-29 PROCEDURE — 6370000000 HC RX 637 (ALT 250 FOR IP): Performed by: NURSE PRACTITIONER

## 2020-01-29 PROCEDURE — 2580000003 HC RX 258: Performed by: NURSE PRACTITIONER

## 2020-01-29 PROCEDURE — 87186 SC STD MICRODIL/AGAR DIL: CPT

## 2020-01-29 PROCEDURE — 85730 THROMBOPLASTIN TIME PARTIAL: CPT

## 2020-01-29 PROCEDURE — 2700000000 HC OXYGEN THERAPY PER DAY

## 2020-01-29 PROCEDURE — 94640 AIRWAY INHALATION TREATMENT: CPT

## 2020-01-29 PROCEDURE — P9045 ALBUMIN (HUMAN), 5%, 250 ML: HCPCS | Performed by: NURSE PRACTITIONER

## 2020-01-29 PROCEDURE — 36415 COLL VENOUS BLD VENIPUNCTURE: CPT

## 2020-01-29 PROCEDURE — 80048 BASIC METABOLIC PNL TOTAL CA: CPT

## 2020-01-29 PROCEDURE — 87206 SMEAR FLUORESCENT/ACID STAI: CPT

## 2020-01-29 PROCEDURE — 87070 CULTURE OTHR SPECIMN AEROBIC: CPT

## 2020-01-29 RX ORDER — ALBUMIN, HUMAN INJ 5% 5 %
12.5 SOLUTION INTRAVENOUS ONCE
Status: COMPLETED | OUTPATIENT
Start: 2020-01-29 | End: 2020-01-29

## 2020-01-29 RX ORDER — FUROSEMIDE 10 MG/ML
40 INJECTION INTRAMUSCULAR; INTRAVENOUS 2 TIMES DAILY
Status: DISCONTINUED | OUTPATIENT
Start: 2020-01-29 | End: 2020-01-29

## 2020-01-29 RX ORDER — ASCORBIC ACID 500 MG
500 TABLET ORAL 2 TIMES DAILY
Status: DISCONTINUED | OUTPATIENT
Start: 2020-01-29 | End: 2020-02-03 | Stop reason: HOSPADM

## 2020-01-29 RX ORDER — BUDESONIDE 0.5 MG/2ML
500 INHALANT ORAL 2 TIMES DAILY
Status: DISCONTINUED | OUTPATIENT
Start: 2020-01-29 | End: 2020-02-03 | Stop reason: HOSPADM

## 2020-01-29 RX ORDER — DOCUSATE SODIUM 100 MG/1
100 CAPSULE, LIQUID FILLED ORAL 2 TIMES DAILY
Status: DISCONTINUED | OUTPATIENT
Start: 2020-01-29 | End: 2020-02-03 | Stop reason: HOSPADM

## 2020-01-29 RX ORDER — LOSARTAN POTASSIUM 25 MG/1
25 TABLET ORAL DAILY
Status: DISCONTINUED | OUTPATIENT
Start: 2020-01-29 | End: 2020-02-03 | Stop reason: HOSPADM

## 2020-01-29 RX ORDER — ASPIRIN 81 MG/1
81 TABLET ORAL DAILY
Status: DISCONTINUED | OUTPATIENT
Start: 2020-01-29 | End: 2020-02-03 | Stop reason: HOSPADM

## 2020-01-29 RX ORDER — ONDANSETRON 2 MG/ML
4 INJECTION INTRAMUSCULAR; INTRAVENOUS EVERY 8 HOURS PRN
Status: DISCONTINUED | OUTPATIENT
Start: 2020-01-29 | End: 2020-02-03 | Stop reason: HOSPADM

## 2020-01-29 RX ORDER — HYDROCODONE BITARTRATE AND ACETAMINOPHEN 5; 325 MG/1; MG/1
1 TABLET ORAL EVERY 4 HOURS PRN
Status: DISCONTINUED | OUTPATIENT
Start: 2020-01-29 | End: 2020-01-29

## 2020-01-29 RX ORDER — ACETAMINOPHEN 325 MG/1
650 TABLET ORAL EVERY 4 HOURS PRN
Status: DISCONTINUED | OUTPATIENT
Start: 2020-01-29 | End: 2020-02-03 | Stop reason: HOSPADM

## 2020-01-29 RX ORDER — BISACODYL 10 MG
10 SUPPOSITORY, RECTAL RECTAL DAILY
Status: DISCONTINUED | OUTPATIENT
Start: 2020-01-29 | End: 2020-01-30

## 2020-01-29 RX ORDER — BUMETANIDE 0.25 MG/ML
1 INJECTION, SOLUTION INTRAMUSCULAR; INTRAVENOUS EVERY 12 HOURS
Status: DISCONTINUED | OUTPATIENT
Start: 2020-01-29 | End: 2020-02-03 | Stop reason: HOSPADM

## 2020-01-29 RX ORDER — FOLIC ACID 1 MG/1
1 TABLET ORAL DAILY
Status: DISCONTINUED | OUTPATIENT
Start: 2020-01-29 | End: 2020-02-03 | Stop reason: HOSPADM

## 2020-01-29 RX ORDER — HYDROCODONE BITARTRATE AND ACETAMINOPHEN 5; 325 MG/1; MG/1
2 TABLET ORAL EVERY 4 HOURS PRN
Status: DISCONTINUED | OUTPATIENT
Start: 2020-01-29 | End: 2020-01-29

## 2020-01-29 RX ORDER — POTASSIUM CHLORIDE 20 MEQ/1
20 TABLET, EXTENDED RELEASE ORAL PRN
Status: DISCONTINUED | OUTPATIENT
Start: 2020-01-29 | End: 2020-02-03 | Stop reason: HOSPADM

## 2020-01-29 RX ORDER — PANTOPRAZOLE SODIUM 40 MG/1
40 TABLET, DELAYED RELEASE ORAL DAILY
Status: DISCONTINUED | OUTPATIENT
Start: 2020-01-29 | End: 2020-02-03 | Stop reason: HOSPADM

## 2020-01-29 RX ORDER — POLYETHYLENE GLYCOL 3350 17 G/17G
17 POWDER, FOR SOLUTION ORAL DAILY
Status: DISCONTINUED | OUTPATIENT
Start: 2020-01-29 | End: 2020-01-30

## 2020-01-29 RX ORDER — FERROUS SULFATE 325(65) MG
325 TABLET ORAL 2 TIMES DAILY WITH MEALS
Status: DISCONTINUED | OUTPATIENT
Start: 2020-01-29 | End: 2020-02-03 | Stop reason: HOSPADM

## 2020-01-29 RX ORDER — FORMOTEROL FUMARATE 20 UG/2ML
20 SOLUTION RESPIRATORY (INHALATION) EVERY 12 HOURS
Status: DISCONTINUED | OUTPATIENT
Start: 2020-01-29 | End: 2020-02-03 | Stop reason: HOSPADM

## 2020-01-29 RX ADMIN — INSULIN LISPRO 1 UNITS: 100 INJECTION, SOLUTION INTRAVENOUS; SUBCUTANEOUS at 12:03

## 2020-01-29 RX ADMIN — IPRATROPIUM BROMIDE AND ALBUTEROL SULFATE 1 AMPULE: 2.5; .5 SOLUTION RESPIRATORY (INHALATION) at 16:18

## 2020-01-29 RX ADMIN — MAGNESIUM HYDROXIDE 30 ML: 2400 SUSPENSION ORAL at 22:29

## 2020-01-29 RX ADMIN — AMIODARONE HYDROCHLORIDE 400 MG: 200 TABLET ORAL at 09:36

## 2020-01-29 RX ADMIN — SENNOSIDES 17.2 MG: 8.6 TABLET, FILM COATED ORAL at 09:36

## 2020-01-29 RX ADMIN — Medication 300 UNITS: at 22:30

## 2020-01-29 RX ADMIN — HEPARIN SODIUM 14 UNITS/KG/HR: 10000 INJECTION, SOLUTION INTRAVENOUS at 22:29

## 2020-01-29 RX ADMIN — TAMSULOSIN HYDROCHLORIDE 0.4 MG: 0.4 CAPSULE ORAL at 09:36

## 2020-01-29 RX ADMIN — BUDESONIDE 500 MCG: 0.5 SUSPENSION RESPIRATORY (INHALATION) at 20:22

## 2020-01-29 RX ADMIN — PIPERACILLIN AND TAZOBACTAM 3.38 G: 3; .375 INJECTION, POWDER, LYOPHILIZED, FOR SOLUTION INTRAVENOUS at 22:28

## 2020-01-29 RX ADMIN — PANTOPRAZOLE SODIUM 40 MG: 40 TABLET, DELAYED RELEASE ORAL at 09:37

## 2020-01-29 RX ADMIN — SODIUM CHLORIDE 25 ML: 9 INJECTION, SOLUTION INTRAVENOUS at 16:47

## 2020-01-29 RX ADMIN — PIPERACILLIN AND TAZOBACTAM 3.38 G: 3; .375 INJECTION, POWDER, LYOPHILIZED, FOR SOLUTION INTRAVENOUS at 12:00

## 2020-01-29 RX ADMIN — IPRATROPIUM BROMIDE AND ALBUTEROL SULFATE 1 AMPULE: 2.5; .5 SOLUTION RESPIRATORY (INHALATION) at 12:15

## 2020-01-29 RX ADMIN — FORMOTEROL FUMARATE DIHYDRATE 20 MCG: 20 SOLUTION RESPIRATORY (INHALATION) at 12:16

## 2020-01-29 RX ADMIN — ASPIRIN 81 MG: 81 TABLET, COATED ORAL at 09:38

## 2020-01-29 RX ADMIN — ALBUMIN (HUMAN) 12.5 G: 12.5 INJECTION, SOLUTION INTRAVENOUS at 12:51

## 2020-01-29 RX ADMIN — IPRATROPIUM BROMIDE AND ALBUTEROL SULFATE 1 AMPULE: 2.5; .5 SOLUTION RESPIRATORY (INHALATION) at 20:22

## 2020-01-29 RX ADMIN — IPRATROPIUM BROMIDE AND ALBUTEROL SULFATE 1 AMPULE: 2.5; .5 SOLUTION RESPIRATORY (INHALATION) at 09:19

## 2020-01-29 RX ADMIN — IPRATROPIUM BROMIDE AND ALBUTEROL SULFATE 1 AMPULE: 2.5; .5 SOLUTION RESPIRATORY (INHALATION) at 04:55

## 2020-01-29 RX ADMIN — SODIUM CHLORIDE, PRESERVATIVE FREE 10 ML: 5 INJECTION INTRAVENOUS at 12:51

## 2020-01-29 RX ADMIN — Medication 500 MG: at 22:30

## 2020-01-29 RX ADMIN — DOCUSATE SODIUM 100 MG: 100 CAPSULE, LIQUID FILLED ORAL at 09:37

## 2020-01-29 RX ADMIN — INSULIN LISPRO 1 UNITS: 100 INJECTION, SOLUTION INTRAVENOUS; SUBCUTANEOUS at 17:24

## 2020-01-29 RX ADMIN — POTASSIUM CHLORIDE 60 MEQ: 1500 TABLET, EXTENDED RELEASE ORAL at 10:15

## 2020-01-29 RX ADMIN — ATORVASTATIN CALCIUM 80 MG: 40 TABLET, FILM COATED ORAL at 22:30

## 2020-01-29 RX ADMIN — LOSARTAN POTASSIUM 25 MG: 25 TABLET, FILM COATED ORAL at 09:36

## 2020-01-29 RX ADMIN — PIPERACILLIN AND TAZOBACTAM 3.38 G: 3; .375 INJECTION, POWDER, LYOPHILIZED, FOR SOLUTION INTRAVENOUS at 02:03

## 2020-01-29 RX ADMIN — Medication 500 MG: at 11:24

## 2020-01-29 RX ADMIN — SODIUM CHLORIDE, PRESERVATIVE FREE 10 ML: 5 INJECTION INTRAVENOUS at 22:29

## 2020-01-29 RX ADMIN — POTASSIUM CHLORIDE 40 MEQ: 1500 TABLET, EXTENDED RELEASE ORAL at 17:05

## 2020-01-29 RX ADMIN — METOPROLOL SUCCINATE 25 MG: 25 TABLET, EXTENDED RELEASE ORAL at 09:38

## 2020-01-29 RX ADMIN — INSULIN LISPRO 1 UNITS: 100 INJECTION, SOLUTION INTRAVENOUS; SUBCUTANEOUS at 22:28

## 2020-01-29 RX ADMIN — INSULIN LISPRO 3 UNITS: 100 INJECTION, SOLUTION INTRAVENOUS; SUBCUTANEOUS at 05:05

## 2020-01-29 RX ADMIN — FERROUS SULFATE TAB 325 MG (65 MG ELEMENTAL FE) 325 MG: 325 (65 FE) TAB at 09:37

## 2020-01-29 RX ADMIN — INSULIN GLARGINE 12 UNITS: 100 INJECTION, SOLUTION SUBCUTANEOUS at 22:28

## 2020-01-29 RX ADMIN — FORMOTEROL FUMARATE DIHYDRATE 20 MCG: 20 SOLUTION RESPIRATORY (INHALATION) at 20:22

## 2020-01-29 RX ADMIN — FUROSEMIDE 40 MG: 10 INJECTION, SOLUTION INTRAMUSCULAR; INTRAVENOUS at 10:38

## 2020-01-29 RX ADMIN — SODIUM CHLORIDE 25 ML: 9 INJECTION, SOLUTION INTRAVENOUS at 04:49

## 2020-01-29 RX ADMIN — AMIODARONE HYDROCHLORIDE 400 MG: 200 TABLET ORAL at 22:30

## 2020-01-29 RX ADMIN — INSULIN LISPRO 1 UNITS: 100 INJECTION, SOLUTION INTRAVENOUS; SUBCUTANEOUS at 08:30

## 2020-01-29 RX ADMIN — DOCUSATE SODIUM 100 MG: 100 CAPSULE, LIQUID FILLED ORAL at 22:31

## 2020-01-29 RX ADMIN — FOLIC ACID 1 MG: 1 TABLET ORAL at 09:37

## 2020-01-29 RX ADMIN — FERROUS SULFATE TAB 325 MG (65 MG ELEMENTAL FE) 325 MG: 325 (65 FE) TAB at 17:06

## 2020-01-29 RX ADMIN — SENNOSIDES 17.2 MG: 8.6 TABLET, FILM COATED ORAL at 22:44

## 2020-01-29 RX ADMIN — BISACODYL 10 MG: 10 SUPPOSITORY RECTAL at 09:36

## 2020-01-29 RX ADMIN — MAGNESIUM GLUCONATE 500 MG ORAL TABLET 400 MG: 500 TABLET ORAL at 09:38

## 2020-01-29 RX ADMIN — BUDESONIDE 500 MCG: 0.5 SUSPENSION RESPIRATORY (INHALATION) at 12:17

## 2020-01-29 ASSESSMENT — PAIN SCALES - GENERAL
PAINLEVEL_OUTOF10: 0

## 2020-01-29 NOTE — PROGRESS NOTES
Telesitter office notified of need for camera.  Currently none available, but patient will be placed 1st on the [facility]

## 2020-01-29 NOTE — CONSULTS
Jocelyne Oconnor M.D.,[facility]  Garrison Winters D.O., F.A.C.O.TESSY., Thomas Garcia M.D. Jon Licona M.D., Luiza Carmona M.D. Patient:  Ilda Leventhal 67 y.o. male MRN: [id number]     Date of Service: 1/29/2020      PULMONARY CONSULTATION    Reason for Consultation: Postoperative hypoxia  Referring Physician: Theron Brunner NP     Communication with the referring physician will be sent via the electronic medical record. Chief Complaint: Cough, shortness of breath, wheezing    CODE STATUS:full    SUBJECTIVE:  HPI:  Ilda Leventhal is a 67 y.o. male who presented 7 days ago from outside hospital and was transferred to [facility] after found to have non-ST elevation MI. Subsequently underwent cardiac cath and was then taken for coronary artery bypass, LLE EV , KAMI on 1/24/2020. Subsequent serial x-rays have shown vascular congestion bibasilar infiltrates and pleural effusions. Our service is been consulted for postoperative hypoxia. Patient is currently lying in bed he has a tele-sitter, bedside nurse is present she reports that he has altered mental status since surgery at times he is hallucinating. He appears in no acute distress and he has no labored breathing. He is currently on 6 L nasal cannula at 93% saturation. He reports wheezing and cough nonproductive. Audible wheezing with turning. Patient is able to tell me today year and alert to self and that he had a heart attack. He does tell me that he see he sees Makayla Rangel on the wall, but otherwise he does give me significant history. He does report he was a former smoker quit 15 years ago and began as a teenager, smoking pack to 1 1/2 packs per day . 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 When he did smoke he smoked a pack and half a day. His wife is not at bedside he reports that she is more versed in history.   Reports shortness of breath and wheezing, in addition to his chest pain began a week prior to this file   Social History Narrative    Not on file     Smoking history: Former smoker  ETOH:   has no history on file for alcohol. Exposures: There  is not history of TB or TB exposure. 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 The patient reports does not have coal, foundry, quarry or [employer] exposure. Recent travel history none . There is not  history of recreational or IV drug use. There is not hot tub exposure. The patient does not have any exotic pets, turtles or exotic birds. Vaccines:    Influenza:  Not indicated  Pneumococcal Polysaccharide:  Not indicated    There is no immunization history on file for this patient.      Home Meds: Medications Prior to Admission: vitamin B-12 (CYANOCOBALAMIN) 500 MCG tablet, Take 500 mcg by mouth daily  Cholecalciferol (VITAMIN D) 50 MCG (2000 UT) CAPS capsule, Take by mouth    CURRENT MEDS :  Scheduled Meds:   docusate sodium  100 mg Oral BID    aspirin  81 mg Oral Daily    ferrous sulfate  325 mg Oral BID WC    vitamin C  500 mg Oral BID    folic acid  1 mg Oral Daily    pantoprazole  40 mg Oral Daily    insulin lispro  0-6 Units Subcutaneous TID WC    insulin lispro  0-3 Units Subcutaneous Nightly    sodium chloride  25 mL Intravenous Q8H    And    piperacillin-tazobactam  3.375 g Intravenous Q8H    bisacodyl  10 mg Rectal Daily    polyethylene glycol  17 g Oral Daily    furosemide  40 mg Intravenous BID    losartan  25 mg Oral Daily    amiodarone  400 mg Oral BID    metoprolol succinate  25 mg Oral Daily    heparin flush  3 mL Intravenous 2 times per day    senna  2 tablet Oral BID    sodium chloride flush  10 mL Intravenous 2 times per day    magnesium oxide  400 mg Oral Daily    ipratropium-albuterol  1 ampule Inhalation Q4H WA    insulin glargine  0.15 Units/kg Subcutaneous Nightly    tamsulosin  0.4 mg Oral Daily    atorvastatin  80 mg Oral Nightly       Continuous Infusions:   heparin (porcine) 14 Units/kg/hr (01/28/20 2556)    dextrose         Allergies   Allergen Reactions    Tetanus Toxoids      Flu like symptoms       REVIEW OF SYSTEMS:  Constitutional: Denies fever, weight loss, night sweats, and fatigue  Skin: Denies pigmentation, dark lesions, and rashes   HEENT: Denies hearing loss, tinnitus, ear drainage, epistaxis, sore throat, and hoarseness. Cardiovascular: Denies palpitations, chest pain, and chest pressure. Respiratory: +cough,+ dyspnea at rest activity, hemoptysis, apnea, and choking. Gastrointestinal: Denies nausea, vomiting, poor appetite, diarrhea, heartburn or reflux  Genitourinary: Denies dysuria, frequency, urgency or hematuria  Musculoskeletal: Denies myalgias, muscle weakness, and bone pain  Neurological: Denies dizziness, vertigo, headache, and focal weakness  Psychological: Denies anxiety and depression  Endocrine: Denies heat intolerance and cold intolerance  Hematopoietic/Lymphatic: Denies bleeding problems and blood transfusions    OBJECTIVE:   BP (!) 120/59   Pulse 82   Temp 98.1 °F (36.7 °C)   Resp 16   Ht 5' 7\" (1.702 m)   Wt 184 lb (83.5 kg)   SpO2 91%   BMI 28.82 kg/m²   SpO2 Readings from Last 1 Encounters:   01/29/20 91%        I/O:    Intake/Output Summary (Last 24 hours) at 1/29/2020 1109  Last data filed at 1/29/2020 0840  Gross per 24 hour   Intake 1738.87 ml   Output 2295 ml   Net -556.13 ml     Vent Information  $Ventilation: $Initial Day  Skin Assessment: Clean, dry, & intact  Vt Ordered: 540 mL  Rate Set: 0 bmp  Peak Flow: 57 L/min  Pressure Support: 8 cmH20  FiO2 : 50 %  Sensitivity: 3  PEEP/CPAP: 5  I Time/ I Time %: 0.8 s       IPAP: 15 cmH20  CPAP/EPAP: 5 cmH2O     Physical Exam:  General: The patient is lying in bed comfortably without any distress.   Breathing is not labored  HEENT: Pupils are equal round and reactive to light, there are no oral lesions and no post-nasal drip   Neck: supple without adenopathy  Cardiovascular: measures 3.2 cm, the aortic arch 2.6 cm, the mid descending   thoracic aorta 2.3 cm and the distal thoracic aorta 2.0 cm. An   intra-aortic balloon pump is present with the tip just distal to the   aortic arch. The balloon inflated with air extends from just above the   diaphragm to below the renal arteries, intermittently occluding flow   to the abdominal organs. Mild calcification of right and left coronary   arteries and LAD, minimal calcification of the circumflex artery. Mild   calcification of the aortic valve. Mild constipation at the top of the   aortic arch. No evidence for great vessel stenosis. No significant   calcification at the mitral valve. Pulmonary arteries have normal   size, 1.9 cm on the right and 1.9 cm on the left. Anyi are not   enlarged. A few small mediastinal and bilateral hilar lymph nodes,   none significantly enlarged.       No hiatal hernia. Normal esophagus. Normal thyroid. No axillary   adenopathy or chest wall edema.       ABDOMEN: Intraoperative aortic balloon pump present. A lobulated 8 mm   x 10 mm probable cyst at the superior margin of the left kidney. No   hydronephrosis. The gallbladder contains a faint dependent layer of   either sludge or a few tiny noncalcified stones. The IVC is slightly   prominent. The hepatic veins are not dilated. Liver does not show   significant fatty change. Liver and spleen appear to be normal size. No ascites. Moderate calcification of the mid SMA.       Bones: No thoracic compression fracture. Moderately large right   lateral spurs throughout the mid and lower thoracic spine. No central   stenosis.         Impression       1. Small bilateral pleural effusions and bibasilar atelectasis right   more than left. 2. Aorta and pulmonary arteries have normal size. Mild CAD. 3. Probable few tiny noncalcified gallstones.                   Echo:   Pulmonic Valve   The pulmonic valve was not well visualized.       Pericardial Effusion   no pericardial effusion      Aorta   mild atherosclerosis of the aorta   intraaortic balloon pump in the descending aorta      Conclusions      Summary   Mild mitral regurgitation with two separate jets. Ejection fraction is visually estimated at 40-45%. septal, inferolateral and apical hypokinesis   Mild tricuspid regurgitation. Signature    Labs:  Lab Results   Component Value Date    WBC 16.4 01/29/2020    HGB 9.8 01/29/2020    HCT 30.0 01/29/2020    MCV 86.2 01/29/2020    MCH 28.2 01/29/2020    MCHC 32.7 01/29/2020    RDW 13.6 01/29/2020     01/29/2020    MPV 12.1 01/29/2020     Lab Results   Component Value Date     01/29/2020    K 3.4 01/29/2020    K 4.4 01/24/2020    CL 96 01/29/2020    CO2 26 01/29/2020    BUN 29 01/29/2020    CREATININE 0.9 01/29/2020    LABALBU 3.5 01/24/2020    CALCIUM 8.4 01/29/2020    GFRAA >60 01/29/2020    LABGLOM >60 01/29/2020     Lab Results   Component Value Date    PROTIME 12.7 01/24/2020    INR 1.1 01/24/2020     No results for input(s): PROBNP in the last 72 hours. No results for input(s): TROPONINI in the last 72 hours. No results for input(s): PROCAL in the last 72 hours. This SmartLink has not been configured with any valid records. Micro:  No results for input(s): CULTRESP in the last 72 hours. No results for input(s): LABGRAM in the last 72 hours. No results for input(s): LEGUR in the last 72 hours. No results for input(s): STREPNEUMAGU in the last 72 hours. No results for input(s): LP1UAG in the last 72 hours. Assessment:  1. Acute hypoxic respiratory failure  2. Bilateral pleural effusions with atelectasis, bibasilar infiltrates, CHF  3. Status post CABG x2 1/24/2020  4. Altered mental status-likely postoperative delirium  5. Leukocytosis  6. Obstipation  7. PAF heparin infusion will need [facility]  8. Hx CAD     Plan:  1.  will add incentive spirometry and pep flutter  2. Continue Zosyn every 8 hours  3.  We will add Perforomist and

## 2020-01-29 NOTE — PLAN OF CARE
Problem: Activity:  Goal: Risk for activity intolerance will decrease  Description  Risk for activity intolerance will decrease  Outcome: Ongoing  Goal: Will verbalize the importance of balancing activity with adequate rest periods  Description  Will verbalize the importance of balancing activity with adequate rest periods  Outcome: Not Met This Shift     Problem: Cardiac:  Goal: Ability to maintain an adequate cardiac output will improve  Description  Ability to maintain an adequate cardiac output will improve  Outcome: Met This Shift     Problem: Coping:  Goal: Ability to verbalize feelings about condition will improve  Description  Ability to verbalize feelings about condition will improve  Outcome: Not Met This Shift  Goal: Ability to identify strategies to decrease anxiety will improve  Description  Ability to identify strategies to decrease anxiety will improve  Outcome: Not Met This Shift     Problem: Health Behavior:  Goal: Ability to identify changes in lifestyle to reduce recurrence of condition will improve  Description  Ability to identify changes in lifestyle to reduce recurrence of condition will improve  Outcome: Not Met This Shift  Goal: Ability to manage health-related needs will improve  Description  Ability to manage health-related needs will improve  Outcome: Not Met This Shift     Problem: Nutritional:  Goal: Ability to identify appropriate dietary choices will improve  Description  Ability to identify appropriate dietary choices will improve  Outcome: Not Met This Shift     Problem: Pain:  Goal: Pain level will decrease  Description  Pain level will decrease  Outcome: Met This Shift  Goal: Control of acute pain  Description  Control of acute pain  Outcome: Met This Shift     Problem: Respiratory:  Goal: Ability to maintain adequate ventilation will improve  Description  Ability to maintain adequate ventilation will improve  Outcome: Ongoing     Problem:  Activity Intolerance:  Goal: Able to perform prescribed physical activity  Description  Able to perform prescribed physical activity  Outcome: Ongoing  Goal: Ability to tolerate increased activity will improve  Description  Ability to tolerate increased activity will improve  Outcome: Ongoing     Problem: Fluid Volume - Imbalance:  Goal: Ability to achieve a balanced intake and output will improve  Description  Ability to achieve a balanced intake and output will improve  Outcome: Met This Shift     Problem: Gas Exchange - Impaired:  Goal: Levels of oxygenation will improve  Description  Levels of oxygenation will improve  Outcome: Ongoing     Problem: Tissue Perfusion - Cardiopulmonary, Altered:  Goal: Absence of angina  Description  Absence of angina  Outcome: Met This Shift  Goal: Hemodynamic stability will improve  Description  Hemodynamic stability will improve  Outcome: Met This Shift  Goal: Will show no evidence of cardiac arrhythmias  Description  Will show no evidence of cardiac arrhythmias  Outcome: Met This Shift

## 2020-01-29 NOTE — PROGRESS NOTES
POD#5 Awake, alert to self and that he had surgery, but otherwise pleasantly confused. No complaints. Denies CP, palpitations, SOB at rest, dizziness/lightheadedness. Vitals:    01/29/20 0455 01/29/20 0500 01/29/20 0638 01/29/20 0650   BP:  139/60 (!) 113/58    Pulse:  80 80    Resp: 25 24 20    Temp:   98.8 °F (37.1 °C)    TempSrc:   Temporal    SpO2: 98% 98% 93% 93%   Weight:       Height:         O2: 6L/NC      Intake/Output Summary (Last 24 hours) at 1/29/2020 0749  Last data filed at 1/29/2020 0600  Gross per 24 hour   Intake 2360.13 ml   Output 2510 ml   Net -149.87 ml         UO: 1085mL/8hr       Recent Labs     01/27/20  0430 01/28/20  0505 01/29/20  0430   WBC 12.3* 17.0* 16.4*   HGB 10.6* 10.6* 9.8*   HCT 32.9* 32.4* 30.0*    243 277      Recent Labs     01/27/20  0430 01/28/20  0505 01/29/20  0430   BUN 30* 30* 29*   CREATININE 0.9 0.9 0.9         Telemetry: NSR      PE  Cardiac: RRR  Lungs: decreased bases  Chest incision with intact GIGI DSD. Sternum stable. Prior chest tube site incisions C/D/I, no erythema with intact sutures. Epicardial pacing wires present and secure.    Abd: Softly distended, nontender, +BS  Ext: Incisions C/D/I, approximated, no erythema, +minmal LE edema      A/P:   Stable s/p CABG x 2, POD#5  --Increase activity as tolerated  --Continue knee high JO ANN hose/pcds/progressive ambulation  --discontinue whelan catheter  --continue epicardial pacing wires for now    Acute blood loss anemia secondary to open heart surgery  --stable    PAF (intermitent afib 1/25 - 1/28)  --now maintaining NSR--tolerating BB and oral amio  --on heparin infusion--will need OAC--continue heparin infusion until tomorrow per Dr. Nolvia Sykes, will transition to eliquis at that time (CHADVASC score at least 5)    Prolonged postoperative respiratory insufficiency  -- wean oxygen to keep SpO2 greater than or equal to 92%  --continue duonebs with ezpap  --encourage C&DB/SMI  --continue empiric zosyn (day

## 2020-01-29 NOTE — PROGRESS NOTES
Hospitalist Progress Note      PCP: Ebenezer Fuentes DO    Date of Admission: 1/22/2020    Hospital course: Seen by cardiology. Had a left heart cath performed. 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 Cardiothoracic surgery consulted and performed CABG x2 (LIMA-LAD, SVG-PDA). With time, the intra-aortic balloon pump was removed. Patient placed in CVICU. With time patient was able to be transferred out. Pulmonology was consulted secondary to continued shortness of breath. Found to have bilateral pleural effusions. To possibly have thoracentesis on 1/30. Diuresed with Bumex. Became delirious with time. Narcotics discontinued and sitter placed at bedside.       Subjective:   -- Patinet seen and examined, chart reviewed   -- Moved out of CVICU  -- No complaints Voiced   -- Remains on Heparin drip but amiodarone switched to oral  -- Remains in NSR at this time     Medications:  Reviewed    Infusion Medications    heparin (porcine) 14 Units/kg/hr (01/28/20 9141)    dextrose       Scheduled Medications    docusate sodium  100 mg Oral BID    aspirin  81 mg Oral Daily    ferrous sulfate  325 mg Oral BID WC    vitamin C  500 mg Oral BID    folic acid  1 mg Oral Daily    pantoprazole  40 mg Oral Daily    insulin lispro  0-6 Units Subcutaneous TID WC    insulin lispro  0-3 Units Subcutaneous Nightly    sodium chloride  25 mL Intravenous Q8H    And    piperacillin-tazobactam  3.375 g Intravenous Q8H    bisacodyl  10 mg Rectal Daily    polyethylene glycol  17 g Oral Daily    losartan  25 mg Oral Daily    formoterol  20 mcg Nebulization Q12H    budesonide  500 mcg Nebulization BID    bumetanide  1 mg Intravenous Q12H    amiodarone  400 mg Oral BID    metoprolol succinate  25 mg Oral Daily    heparin flush  3 mL Intravenous 2 times per day    senna  2 tablet Oral BID    sodium chloride flush  10 mL Intravenous 2 times per day    01/29/20  1552     --  137 137  --    K 4.4   < > 4.2 3.4* 3.7     --  99 96*  --    CO2 20*  --  28 26  --    BUN 30*  --  30* 29*  --    CREATININE 0.9  --  0.9 0.9  --    CALCIUM 8.7  --  8.7 8.4*  --     < > = values in this interval not displayed. No results for input(s): AST, ALT, BILIDIR, BILITOT, ALKPHOS in the last 72 hours. No results for input(s): INR in the last 72 hours. No results for input(s): Monda Saupe in the last 72 hours. Urinalysis:      Lab Results   Component Value Date    NITRU Negative 01/23/2020    WBCUA 2-5 01/23/2020    BACTERIA NONE 01/23/2020    RBCUA NONE 01/23/2020    BLOODU TRACE-INTACT 01/23/2020    SPECGRAV <=1.005 01/23/2020    GLUCOSEU Negative 01/23/2020       Radiology:  XR CHEST PORTABLE   Final Result   Stable postoperative chest with the CHF. XR CHEST PORTABLE   Final Result   Stable postoperative chest with persistent mild CHF. XR CHEST PORTABLE   Final Result   Interval placement of a left-sided PICC with the tip   overlying the cavoatrial junction. Otherwise no significant change   from previous exam.                     XR CHEST PORTABLE   Final Result   Stable postoperative chest with  CHF. XR CHEST PORTABLE   Final Result   Bibasilar atelectasis with a small left-sided effusion, but no   evidence of cardiac enlargement or decompensation. Stable and uncomplicated appearance of support measures. XR CHEST PORTABLE   Final Result   Extubated. Postoperative changes. Two mediastinal drains and left pleural catheter stable in position. Bilateral pleural effusions and atelectasis, worse on the right   compared to the previous study. XR CHEST PORTABLE   Final Result      1. Postoperative changes from recent CABG. 2. Bibasilar opacities, somewhat increased along the left lung base   likely represent atelectasis. Pneumonia cannot be excluded.    3. The life support lines and tubes appear well

## 2020-01-29 NOTE — PLAN OF CARE
Problem: Cardiac:  Goal: Ability to maintain an adequate cardiac output will improve  Description  Ability to maintain an adequate cardiac output will improve  1/29/2020 1409 by Chapin Osman RN  Outcome: Met This Shift       Problem: Pain:  Goal: Pain level will decrease  Description  Pain level will decrease  1/29/2020 1409 by Chapin Osman RN  Outcome: Met This Shift    Goal: Control of acute pain  Description  Control of acute pain  1/29/2020 1409 by Chapin Osman RN  Outcome: Met This Shift

## 2020-01-29 NOTE — PROGRESS NOTES
Nutrition Education    Type and Reason for Visit: Patient Education    Nutrition Assessment:  Diet instruction completed on heart healthy/consistent carbohydrate diet       Provided educational handout. Left at bedside for wife. Pt admits to eating out often. · Verbally reviewed information with patient  · Written educational materials provided. · Contact name and number provided. · Refer to Patient Education activity for more details.     Electronically signed by Bryon Pizano RD, LD on 1/29/20 at 12:51 PM    Contact Number: [phone]

## 2020-01-29 NOTE — PROGRESS NOTES
Telephone report received. Per RN, patient has been confused at time. Staffing office notified of need for patient . Will send telepak when tube available. Evelyne Quan RN

## 2020-01-29 NOTE — PROGRESS NOTES
Patient transferred to [facility] room 6512. Telephone report given to receiving nurse. Notified RN that patient has been confused and bed alarm and possible sitter needed. VSS upon transport.  Transport and RN Transferred patient upstairs on 6 L NC.

## 2020-01-29 NOTE — PROGRESS NOTES
Spoke with ultrasound department re: thoracentesis tomorrow. They will call tomorrow to notify floor of time. Supplies placed at bedside.

## 2020-01-29 NOTE — PROGRESS NOTES
[facility] Cardiology Inpatient Progress Note    Patient is a 67 y.o. male of Karin Anaya DO seen in hospital follow up. Chief complaint: CAD-CABG/Afib/CMP    HPI: Some SOB. No CP.     Patient Active Problem List   Diagnosis    CAD in native artery    Non-ST elevation MI (NSTEMI) ([facility] [address]    Chest pain    Ischemic cardiomyopathy    Atrial fibrillation with RVR (HCC)       Allergies   Allergen Reactions    Tetanus Toxoids      Flu like symptoms       Current Facility-Administered Medications   Medication Dose Route Frequency Provider Last Rate Last Dose    acetaminophen (TYLENOL) tablet 650 mg  650 mg Oral Q4H PRN Eloy Tapia, APRN - CNP        docusate sodium (COLACE) capsule 100 mg  100 mg Oral BID Eloy Tapia APRN - CNP        magnesium hydroxide (MILK OF MAGNESIA) 400 MG/5ML suspension 30 mL  30 mL Oral Daily PRN Eloy Tapia, APRN - CNP        aspirin EC tablet 81 mg  81 mg Oral Daily Eloy Tapia, APRN - CNP        ferrous sulfate tablet 325 mg  325 mg Oral BID  Eloy Tapia APRN - CNP        vitamin C (ASCORBIC ACID) tablet 500 mg  500 mg Oral BID Eloy Tapia, APRN - CNP        folic acid (FOLVITE) tablet 1 mg  1 mg Oral Daily Eloy Tapia APRN - CNP        potassium chloride (KLOR-CON M) extended release tablet 20 mEq  20 mEq Oral PRN Eloy Tapia, APRN - CNP        ondansetron [facility]) injection 4 mg  4 mg Intravenous Q8H PRN Eloy Tapia APRN - CNP        pantoprazole (PROTONIX) tablet 40 mg  40 mg Oral Daily Eloy Tapia APRN - CNP        insulin lispro (HUMALOG) injection vial 0-6 Units  0-6 Units Subcutaneous TID  SATHYA Randall - CNP        insulin lispro (HUMALOG) injection vial 0-3 Units  0-3 Units Subcutaneous Nightly SATHYA Randall - CNP        0.9 % sodium chloride infusion admixture  25 mL Intravenous Q8H SATHYA Bell CNP        And    piperacillin-tazobactam (ZOSYN) 3.375 g in dextrose 5 % 100 mL IVPB extended infusion (mini-bag)  3.375 g Intravenous Q8H SATHYA Cuevas CNP        bisacodyl (DULCOLAX) suppository 10 mg  10 mg Rectal Daily SATHYA Cuevas CNP        polyethylene glycol (GLYCOLAX) packet 17 g  17 g Oral Daily SATHYA Cuevas CNP        furosemide (LASIX) injection 40 mg  40 mg Intravenous BID SATHYA Cuevas CNP        perflutren lipid microspheres (DEFINITY) injection 1.65 mg  1.5 mL Intravenous ONCE PRN SATHYA Cuevas CNP        amiodarone (CORDARONE) tablet 400 mg  400 mg Oral BID Maria De Canavan, APRN - CNP   400 mg at 01/28/20 2143    metoprolol succinate (TOPROL XL) extended release tablet 25 mg  25 mg Oral Daily Dorina Canavan, APRN - CNP   25 mg at 01/28/20 0854    sodium chloride flush 0.9 % injection 10 mL  10 mL Intravenous PRN Dorina Canavan, APRN - CNP        heparin flush 100 UNIT/ML injection 300 Units  3 mL Intravenous 2 times per day Maria De Canavan, APRN - CNP        heparin flush 100 UNIT/ML injection 300 Units  3 mL Intracatheter PRN Dorina Canavan, APRN - CNP        heparin 25,000 units in dextrose 5% 250 mL infusion  12 Units/kg/hr Intravenous Continuous Lukevoeane Canavan, APRN - CNP 11.6 mL/hr at 01/28/20 2356 14 Units/kg/hr at 01/28/20 2356    senna (SENOKOT) tablet 17.2 mg  2 tablet Oral BID Dorina Canavan, APRN - CNP   17.2 mg at 01/28/20 2142    sodium chloride flush 0.9 % injection 10 mL  10 mL Intravenous 2 times per day Yevonne Canavan, APRN - CNP   10 mL at 01/27/20 0848    sodium chloride flush 0.9 % injection 10 mL  10 mL Intravenous PRN Dorina Canavan, APRN - CNP        magnesium oxide (MAG-OX) tablet 400 mg  400 mg Oral Daily Lukevonne Canavan, APRN - CNP   400 mg at 01/28/20 9515    ipratropium-albuterol (DUONEB) nebulizer solution 1 ampule  1 ampule Inhalation Q4H WA Yevonne Canavan, APRN - CNP   1 ampule at 01/29/20 0455    insulin glargine (LANTUS) injection vial 12 Units  0.15 Units/kg Subcutaneous Nightly pulses. No edema  Neurological: Alert and oriented to person, place, and time. Skin: Skin is warm and dry. No rash noted. Not diaphoretic. No erythema. Psychiatric: Normal mood and affect. Behavior is normal.   Lymphadenopathy: No cervical adenopathy. No groin adenopathy. CBC:   Lab Results   Component Value Date    WBC 16.4 01/29/2020    RBC 3.48 01/29/2020    HGB 9.8 01/29/2020    HCT 30.0 01/29/2020    MCV 86.2 01/29/2020    MCH 28.2 01/29/2020    MCHC 32.7 01/29/2020    RDW 13.6 01/29/2020     01/29/2020    MPV 12.1 01/29/2020     BMP:   Lab Results   Component Value Date     01/29/2020    K 3.4 01/29/2020    K 4.4 01/24/2020    CL 96 01/29/2020    CO2 26 01/29/2020    BUN 29 01/29/2020    LABALBU 3.5 01/24/2020    CREATININE 0.9 01/29/2020    CALCIUM 8.4 01/29/2020    GFRAA >60 01/29/2020    LABGLOM >60 01/29/2020     Magnesium:    Lab Results   Component Value Date    MG 2.2 01/29/2020     Cardiac Enzymes:   Lab Results   Component Value Date    TROPONINI 6.45 (H) 01/23/2020      PT/INR:    Lab Results   Component Value Date    PROTIME 12.7 01/24/2020    INR 1.1 01/24/2020     TSH:  No results found for: TSH    Rhythm Strip: NSR    ASSESSMENT & PLAN:    Patient Active Problem List   Diagnosis    CAD in native artery    Non-ST elevation MI (NSTEMI) ([facility] [address]    Chest pain    Ischemic cardiomyopathy    Atrial fibrillation with RVR ([facility] [address]     1. CAD-CABG: status post two-vessel CABG with LIMA to LAD and SVG to PDA     Supportive care transitioning to guideline based therapy for his CAD and CMP. 2. CMP: Limited echo pending. Guideline medications as able. Continue toprol and lasix. Add ARB, convert to [facility] and add aldactone if indicated. Lifevest on discharge if indicated. 3. PAF: In sinus. CHADVASC score at least 5. Will need anticoagulation to prevent CVA. Amio. On IV heparin, switch to NOAC before discharge. Bernard Chu D.O.   Cardiologist  Cardiology,

## 2020-01-30 ENCOUNTER — APPOINTMENT (OUTPATIENT)
Dept: ULTRASOUND IMAGING | Age: 75
DRG: 233 | End: 2020-01-30
Attending: INTERNAL MEDICINE
Payer: MEDICARE

## 2020-01-30 ENCOUNTER — APPOINTMENT (OUTPATIENT)
Dept: GENERAL RADIOLOGY | Age: 75
DRG: 233 | End: 2020-01-30
Attending: INTERNAL MEDICINE
Payer: MEDICARE

## 2020-01-30 LAB
ABO/RH: NORMAL
ANION GAP SERPL CALCULATED.3IONS-SCNC: 14 MMOL/L (ref 7–16)
ANTIBODY SCREEN: NORMAL
APPEARANCE FLUID: NORMAL
APTT: 42.1 SEC (ref 24.5–35.1)
BUN BLDV-MCNC: 30 MG/DL (ref 8–23)
CALCIUM SERPL-MCNC: 8.3 MG/DL (ref 8.6–10.2)
CELL COUNT FLUID TYPE: NORMAL
CHLORIDE BLD-SCNC: 103 MMOL/L (ref 98–107)
CO2: 25 MMOL/L (ref 22–29)
COLOR FLUID: NORMAL
CREAT SERPL-MCNC: 0.9 MG/DL (ref 0.7–1.2)
FLUID TYPE: NORMAL
GFR AFRICAN AMERICAN: >60
GFR NON-AFRICAN AMERICAN: >60 ML/MIN/1.73
GLUCOSE BLD-MCNC: 110 MG/DL (ref 74–99)
GLUCOSE, FLUID: 154 MG/DL
HCT VFR BLD CALC: 27.3 % (ref 37–54)
HCT VFR BLD CALC: 30.2 % (ref 37–54)
HCT VFR BLD CALC: 30.4 % (ref 37–54)
HCT VFR BLD CALC: 31.5 % (ref 37–54)
HEMATOCRIT FLUID: <2 %
HEMOGLOBIN: 10.1 G/DL (ref 12.5–16.5)
HEMOGLOBIN: 8.5 G/DL (ref 12.5–16.5)
HEMOGLOBIN: 9.6 G/DL (ref 12.5–16.5)
HEMOGLOBIN: 9.9 G/DL (ref 12.5–16.5)
LD, FLUID: 842 U/L
MAGNESIUM: 2.3 MG/DL (ref 1.6–2.6)
MCH RBC QN AUTO: 28.6 PG (ref 26–35)
MCHC RBC AUTO-ENTMCNC: 32.8 % (ref 32–34.5)
MCV RBC AUTO: 87.3 FL (ref 80–99.9)
METER GLUCOSE: 112 MG/DL (ref 74–99)
METER GLUCOSE: 159 MG/DL (ref 74–99)
METER GLUCOSE: 180 MG/DL (ref 74–99)
METER GLUCOSE: 209 MG/DL (ref 74–99)
MONOCYTE, FLUID: 40 %
NEUTROPHIL, FLUID: 60 %
NUCLEATED CELLS FLUID: 917 /UL
PDW BLD-RTO: 14 FL (ref 11.5–15)
PH, BODY FLUID: 8
PLATELET # BLD: 315 E9/L (ref 130–450)
PMV BLD AUTO: 11.7 FL (ref 7–12)
POTASSIUM SERPL-SCNC: 3.8 MMOL/L (ref 3.5–5)
PROTEIN FLUID: 2.9 G/DL
RBC # BLD: 3.46 E12/L (ref 3.8–5.8)
RBC FLUID: NORMAL /UL
SODIUM BLD-SCNC: 142 MMOL/L (ref 132–146)
WBC # BLD: 17 E9/L (ref 4.5–11.5)

## 2020-01-30 PROCEDURE — 2140000000 HC CCU INTERMEDIATE R&B

## 2020-01-30 PROCEDURE — 94660 CPAP INITIATION&MGMT: CPT

## 2020-01-30 PROCEDURE — 80048 BASIC METABOLIC PNL TOTAL CA: CPT

## 2020-01-30 PROCEDURE — 97535 SELF CARE MNGMENT TRAINING: CPT

## 2020-01-30 PROCEDURE — 6370000000 HC RX 637 (ALT 250 FOR IP): Performed by: NURSE PRACTITIONER

## 2020-01-30 PROCEDURE — 83986 ASSAY PH BODY FLUID NOS: CPT

## 2020-01-30 PROCEDURE — 85014 HEMATOCRIT: CPT

## 2020-01-30 PROCEDURE — 82947 ASSAY GLUCOSE BLOOD QUANT: CPT

## 2020-01-30 PROCEDURE — 6360000002 HC RX W HCPCS: Performed by: CLINICAL NURSE SPECIALIST

## 2020-01-30 PROCEDURE — 88305 TISSUE EXAM BY PATHOLOGIST: CPT

## 2020-01-30 PROCEDURE — 36415 COLL VENOUS BLD VENIPUNCTURE: CPT

## 2020-01-30 PROCEDURE — 85027 COMPLETE CBC AUTOMATED: CPT

## 2020-01-30 PROCEDURE — 85013 SPUN MICROHEMATOCRIT: CPT

## 2020-01-30 PROCEDURE — 94640 AIRWAY INHALATION TREATMENT: CPT

## 2020-01-30 PROCEDURE — 86900 BLOOD TYPING SEROLOGIC ABO: CPT

## 2020-01-30 PROCEDURE — 6360000002 HC RX W HCPCS: Performed by: NURSE PRACTITIONER

## 2020-01-30 PROCEDURE — P9016 RBC LEUKOCYTES REDUCED: HCPCS

## 2020-01-30 PROCEDURE — 99233 SBSQ HOSP IP/OBS HIGH 50: CPT | Performed by: INTERNAL MEDICINE

## 2020-01-30 PROCEDURE — 32555 ASPIRATE PLEURA W/ IMAGING: CPT

## 2020-01-30 PROCEDURE — 6360000002 HC RX W HCPCS: Performed by: THORACIC SURGERY (CARDIOTHORACIC VASCULAR SURGERY)

## 2020-01-30 PROCEDURE — 2580000003 HC RX 258: Performed by: NURSE PRACTITIONER

## 2020-01-30 PROCEDURE — 84157 ASSAY OF PROTEIN OTHER: CPT

## 2020-01-30 PROCEDURE — 85730 THROMBOPLASTIN TIME PARTIAL: CPT

## 2020-01-30 PROCEDURE — 83735 ASSAY OF MAGNESIUM: CPT

## 2020-01-30 PROCEDURE — 86923 COMPATIBILITY TEST ELECTRIC: CPT

## 2020-01-30 PROCEDURE — 94760 N-INVAS EAR/PLS OXIMETRY 1: CPT

## 2020-01-30 PROCEDURE — 2700000000 HC OXYGEN THERAPY PER DAY

## 2020-01-30 PROCEDURE — 85018 HEMOGLOBIN: CPT

## 2020-01-30 PROCEDURE — 89051 BODY FLUID CELL COUNT: CPT

## 2020-01-30 PROCEDURE — 86850 RBC ANTIBODY SCREEN: CPT

## 2020-01-30 PROCEDURE — 71045 X-RAY EXAM CHEST 1 VIEW: CPT

## 2020-01-30 PROCEDURE — 94761 N-INVAS EAR/PLS OXIMETRY MLT: CPT

## 2020-01-30 PROCEDURE — 93308 TTE F-UP OR LMTD: CPT

## 2020-01-30 PROCEDURE — 87205 SMEAR GRAM STAIN: CPT

## 2020-01-30 PROCEDURE — P9047 ALBUMIN (HUMAN), 25%, 50ML: HCPCS | Performed by: THORACIC SURGERY (CARDIOTHORACIC VASCULAR SURGERY)

## 2020-01-30 PROCEDURE — 6370000000 HC RX 637 (ALT 250 FOR IP): Performed by: INTERNAL MEDICINE

## 2020-01-30 PROCEDURE — 0W9B3ZX DRAINAGE OF LEFT PLEURAL CAVITY, PERCUTANEOUS APPROACH, DIAGNOSTIC: ICD-10-PCS | Performed by: INTERNAL MEDICINE

## 2020-01-30 PROCEDURE — 36592 COLLECT BLOOD FROM PICC: CPT

## 2020-01-30 PROCEDURE — 2500000003 HC RX 250 WO HCPCS: Performed by: INTERNAL MEDICINE

## 2020-01-30 PROCEDURE — 6360000004 HC RX CONTRAST MEDICATION: Performed by: NURSE PRACTITIONER

## 2020-01-30 PROCEDURE — 93798 PHYS/QHP OP CAR RHAB W/ECG: CPT

## 2020-01-30 PROCEDURE — 87070 CULTURE OTHR SPECIMN AEROBIC: CPT

## 2020-01-30 PROCEDURE — 83615 LACTATE (LD) (LDH) ENZYME: CPT

## 2020-01-30 PROCEDURE — 82962 GLUCOSE BLOOD TEST: CPT

## 2020-01-30 PROCEDURE — 86901 BLOOD TYPING SEROLOGIC RH(D): CPT

## 2020-01-30 PROCEDURE — 88112 CYTOPATH CELL ENHANCE TECH: CPT

## 2020-01-30 RX ORDER — 0.9 % SODIUM CHLORIDE 0.9 %
20 INTRAVENOUS SOLUTION INTRAVENOUS ONCE
Status: COMPLETED | OUTPATIENT
Start: 2020-01-30 | End: 2020-01-31

## 2020-01-30 RX ORDER — BISACODYL 10 MG
10 SUPPOSITORY, RECTAL RECTAL DAILY PRN
Status: DISCONTINUED | OUTPATIENT
Start: 2020-01-30 | End: 2020-02-03 | Stop reason: HOSPADM

## 2020-01-30 RX ORDER — SENNA PLUS 8.6 MG/1
2 TABLET ORAL NIGHTLY
Status: DISCONTINUED | OUTPATIENT
Start: 2020-01-31 | End: 2020-02-03 | Stop reason: HOSPADM

## 2020-01-30 RX ORDER — ALBUMIN (HUMAN) 12.5 G/50ML
50 SOLUTION INTRAVENOUS ONCE
Status: COMPLETED | OUTPATIENT
Start: 2020-01-30 | End: 2020-01-30

## 2020-01-30 RX ADMIN — PIPERACILLIN AND TAZOBACTAM 3.38 G: 3; .375 INJECTION, POWDER, LYOPHILIZED, FOR SOLUTION INTRAVENOUS at 13:00

## 2020-01-30 RX ADMIN — ALBUMIN (HUMAN) 50 G: 0.25 INJECTION, SOLUTION INTRAVENOUS at 16:55

## 2020-01-30 RX ADMIN — FERROUS SULFATE TAB 325 MG (65 MG ELEMENTAL FE) 325 MG: 325 (65 FE) TAB at 17:31

## 2020-01-30 RX ADMIN — MAGNESIUM GLUCONATE 500 MG ORAL TABLET 400 MG: 500 TABLET ORAL at 09:19

## 2020-01-30 RX ADMIN — FERROUS SULFATE TAB 325 MG (65 MG ELEMENTAL FE) 325 MG: 325 (65 FE) TAB at 09:21

## 2020-01-30 RX ADMIN — PIPERACILLIN AND TAZOBACTAM 3.38 G: 3; .375 INJECTION, POWDER, LYOPHILIZED, FOR SOLUTION INTRAVENOUS at 21:10

## 2020-01-30 RX ADMIN — INSULIN LISPRO 1 UNITS: 100 INJECTION, SOLUTION INTRAVENOUS; SUBCUTANEOUS at 16:54

## 2020-01-30 RX ADMIN — BUDESONIDE 500 MCG: 0.5 SUSPENSION RESPIRATORY (INHALATION) at 20:15

## 2020-01-30 RX ADMIN — IPRATROPIUM BROMIDE AND ALBUTEROL SULFATE 1 AMPULE: 2.5; .5 SOLUTION RESPIRATORY (INHALATION) at 03:17

## 2020-01-30 RX ADMIN — SODIUM CHLORIDE 25 ML: 9 INJECTION, SOLUTION INTRAVENOUS at 03:01

## 2020-01-30 RX ADMIN — Medication 300 UNITS: at 21:23

## 2020-01-30 RX ADMIN — SODIUM CHLORIDE, PRESERVATIVE FREE 10 ML: 5 INJECTION INTRAVENOUS at 14:47

## 2020-01-30 RX ADMIN — IPRATROPIUM BROMIDE AND ALBUTEROL SULFATE 1 AMPULE: 2.5; .5 SOLUTION RESPIRATORY (INHALATION) at 17:15

## 2020-01-30 RX ADMIN — PANTOPRAZOLE SODIUM 40 MG: 40 TABLET, DELAYED RELEASE ORAL at 09:25

## 2020-01-30 RX ADMIN — HEPARIN SODIUM 16 UNITS/KG/HR: 10000 INJECTION, SOLUTION INTRAVENOUS at 06:40

## 2020-01-30 RX ADMIN — ASPIRIN 81 MG: 81 TABLET, COATED ORAL at 09:19

## 2020-01-30 RX ADMIN — SODIUM CHLORIDE, PRESERVATIVE FREE 10 ML: 5 INJECTION INTRAVENOUS at 05:16

## 2020-01-30 RX ADMIN — BUDESONIDE 500 MCG: 0.5 SUSPENSION RESPIRATORY (INHALATION) at 09:29

## 2020-01-30 RX ADMIN — METOPROLOL SUCCINATE 25 MG: 25 TABLET, EXTENDED RELEASE ORAL at 09:21

## 2020-01-30 RX ADMIN — SODIUM CHLORIDE, PRESERVATIVE FREE 10 ML: 5 INJECTION INTRAVENOUS at 09:21

## 2020-01-30 RX ADMIN — POTASSIUM CHLORIDE 40 MEQ: 1500 TABLET, EXTENDED RELEASE ORAL at 09:24

## 2020-01-30 RX ADMIN — IPRATROPIUM BROMIDE AND ALBUTEROL SULFATE 1 AMPULE: 2.5; .5 SOLUTION RESPIRATORY (INHALATION) at 09:29

## 2020-01-30 RX ADMIN — INSULIN LISPRO 1 UNITS: 100 INJECTION, SOLUTION INTRAVENOUS; SUBCUTANEOUS at 21:09

## 2020-01-30 RX ADMIN — PIPERACILLIN AND TAZOBACTAM 3.38 G: 3; .375 INJECTION, POWDER, LYOPHILIZED, FOR SOLUTION INTRAVENOUS at 05:16

## 2020-01-30 RX ADMIN — IPRATROPIUM BROMIDE AND ALBUTEROL SULFATE 1 AMPULE: 2.5; .5 SOLUTION RESPIRATORY (INHALATION) at 20:16

## 2020-01-30 RX ADMIN — BUMETANIDE 1 MG: 0.25 INJECTION INTRAMUSCULAR; INTRAVENOUS at 14:46

## 2020-01-30 RX ADMIN — DOCUSATE SODIUM 100 MG: 100 CAPSULE, LIQUID FILLED ORAL at 21:11

## 2020-01-30 RX ADMIN — FOLIC ACID 1 MG: 1 TABLET ORAL at 09:21

## 2020-01-30 RX ADMIN — AMIODARONE HYDROCHLORIDE 400 MG: 200 TABLET ORAL at 09:21

## 2020-01-30 RX ADMIN — IPRATROPIUM BROMIDE AND ALBUTEROL SULFATE 1 AMPULE: 2.5; .5 SOLUTION RESPIRATORY (INHALATION) at 13:48

## 2020-01-30 RX ADMIN — Medication 500 MG: at 21:23

## 2020-01-30 RX ADMIN — PERFLUTREN 1.65 MG: 6.52 INJECTION, SUSPENSION INTRAVENOUS at 13:11

## 2020-01-30 RX ADMIN — Medication 500 MG: at 09:19

## 2020-01-30 RX ADMIN — INSULIN GLARGINE 12 UNITS: 100 INJECTION, SOLUTION SUBCUTANEOUS at 21:09

## 2020-01-30 RX ADMIN — SODIUM CHLORIDE, PRESERVATIVE FREE 10 ML: 5 INJECTION INTRAVENOUS at 01:05

## 2020-01-30 RX ADMIN — FORMOTEROL FUMARATE DIHYDRATE 20 MCG: 20 SOLUTION RESPIRATORY (INHALATION) at 09:30

## 2020-01-30 RX ADMIN — AMIODARONE HYDROCHLORIDE 400 MG: 200 TABLET ORAL at 21:11

## 2020-01-30 RX ADMIN — TAMSULOSIN HYDROCHLORIDE 0.4 MG: 0.4 CAPSULE ORAL at 09:21

## 2020-01-30 RX ADMIN — Medication 300 UNITS: at 09:22

## 2020-01-30 RX ADMIN — LOSARTAN POTASSIUM 25 MG: 25 TABLET, FILM COATED ORAL at 09:19

## 2020-01-30 RX ADMIN — BUMETANIDE 1 MG: 0.25 INJECTION INTRAMUSCULAR; INTRAVENOUS at 01:05

## 2020-01-30 RX ADMIN — FORMOTEROL FUMARATE DIHYDRATE 20 MCG: 20 SOLUTION RESPIRATORY (INHALATION) at 20:16

## 2020-01-30 RX ADMIN — SODIUM CHLORIDE 25 ML: 9 INJECTION, SOLUTION INTRAVENOUS at 15:15

## 2020-01-30 RX ADMIN — ATORVASTATIN CALCIUM 80 MG: 40 TABLET, FILM COATED ORAL at 21:10

## 2020-01-30 ASSESSMENT — PAIN SCALES - GENERAL
PAINLEVEL_OUTOF10: 0
PAINLEVEL_OUTOF10: 0

## 2020-01-30 NOTE — PROGRESS NOTES
OT BEDSIDE TREATMENT NOTE      Date:2020  Patient Name: Eric Clark  MRN: [id number]  : 1945  Room: [address] [place], [place] [address]      Evaluating OT: Tate Dillon, OTR/L [id number]     AM-PAC Daily Activity Raw Score: 15/24     Recommended Adaptive Equipment:  LB dressing AE, shower seat, raised commode as needed     Comments: Based on patient's functional performance as documented below and prior level of function, patient would benefit from continued skilled OT during/following hospital stay in an effort to increase functional safety, independence with ADLS/IADLS, and overall quality of life.     Diagnosis: CAD; NSTEMI  Surgery:  CORONARY ARTERY BYPASS x2 (LIMA-LAD, SVG-PDA), LLE EVH, KAMI      Pertinent Medical History: hernia, MI, Afib/RVR     Precautions:  Falls, sternal, 6L O2, arterial line, drain, pacer,       Home Living: Pt lives with wife  in a 2 floor plan with threshold  to enter and no rail(s); bed/bath on 2nd floor; pt reports his bed will be moved down to first floor. Full bath available on first floor. Bathroom setup: tub/shower; standard commode height   Equipment owned: no DME     Prior Level of Function: IND with ADLs;  IND with IADLs. No devce for ambulation. Driving: yes  Occupation: retired; works out at [employer]; stays active     Pain Level: pt c/o 0/10 chest pain today      Cognition: A&O: 4/4    Follows 1-2 step commands appropriately              Memory: Fair- min confusion regarding precautions              Comprehension F              Problem solving: F; min cues to maintain precautions during transfers              Judgement/safety: F                 Communication skills: WFL              Vision: [employer]                     Glasses:readers                                                        Hearing: WFL                RASS: 0  CAM-ICU: (NT) Delirium     UE Assessment:  Hand Dominance: Right [x]?   Left []?       ROM Strength STM goal: PRN   RUE  Grossly WFL within AD/DME as needed for balance and safety   Functional Mobility Mod A no device;   short shuffled steps due to LE weakness Min A (no AD, to/from bathroom)                       Mod I   functional/bathroom mobility using AD as needed & demonstrating G safety      Balance Sitting:     Static:  SBA    Dynamic:Min A  Standing: Mod A Sitting:     Static:  SBA    Dynamic:Min A  Standing: Min A    Chel dynamic sitting balance; Chel dynamic standing balance  during ADL tasks & transfers   Endurance/Activity Tolerance    F tolerance with moderate activity Fair (continues to be pleasantly confused with delusions)    G   tolerance with moderate activity/self care routine   Visual/  Perceptual               WFL                                       Vitals:   HR at rest: 72 bpm HR at end of session: 72 bpm   BP at rest: 134/52 mmHg BP at end of session: 143/64 mmHg   Spo2 at rest:93% Spo2 at end of session: 93%     Comments: Cleared by RN to see pt. Upon arrival pt supine in bed and agreeable to OT session. Completed sponge bath. Mod A for supine-sit EOB. Completed grooming task and educated on LE dressing. Min A for functional mobiltiy to bathroom. Pt continues to appear pleasantly confused but appears to have tolerated session well. Re-educated on sternal precautions. At end of session, pt left supine in bed with call light within reach,  present. · Pt has made fair progress towards set goals.    · Continue with current plan of care    Time in: 10:30  Time out:11:15  Total Tx Time: 45 min    Sukhjinder Rios, OTR/L [id number]

## 2020-01-30 NOTE — PROGRESS NOTES
status: Alert and Oriented X3 . Pertinent/ New Labs and Imaging Studies     Imaging Personally Reviewed:      ECHO  Effusion   No evidence for hemodynamically significant pericardial effusion.      Aorta   Normal aortic root and ascending aorta.      Conclusions      Summary   Limited study.      Ejection fraction is visually estimated at 45-50%.   Distal anteroseptal and apical walls are hypokinetic.   Septal motion consistent with post bypass surgery.   Normal right ventricle size with decreased function.   No evidence for hemodynamically significant pericardial effusion.      Signature       Labs:  Lab Results   Component Value Date    WBC 17.0 01/30/2020    HGB 10.1 01/30/2020    HCT 31.5 01/30/2020    MCV 87.3 01/30/2020    MCH 28.6 01/30/2020    MCHC 32.8 01/30/2020    RDW 14.0 01/30/2020     01/30/2020    MPV 11.7 01/30/2020     Lab Results   Component Value Date     01/30/2020    K 3.8 01/30/2020    K 4.4 01/24/2020     01/30/2020    CO2 25 01/30/2020    BUN 30 01/30/2020    CREATININE 0.9 01/30/2020    LABALBU 3.5 01/24/2020    CALCIUM 8.3 01/30/2020    GFRAA >60 01/30/2020    LABGLOM >60 01/30/2020     Lab Results   Component Value Date    PROTIME 12.7 01/24/2020    INR 1.1 01/24/2020     No results for input(s): PROBNP in the last 72 hours. No results for input(s): PROCAL in the last 72 hours. This SmartLink has not been configured with any valid records. Micro:  Recent Labs     01/29/20  1435   CULTRESP Oral Pharyngeal Rose reduced*  Heavy growth  Identification and sensitivity to follow       No results for input(s): LABGRAM in the last 72 hours. No results for input(s): LEGUR in the last 72 hours. No results for input(s): STREPNEUMAGU in the last 72 hours. No results for input(s): LP1UAG in the last 72 hours. Assessment:    1. Acute hypoxic respiratory failure  2. Bilateral pleural effusions with atelectasis, bibasilar infiltrates, CHF  3.  Status post

## 2020-01-30 NOTE — PROGRESS NOTES
9. 8* 9.9* 10.1* 9.6*   HCT 32.4* 30.0* 30.2* 31.5* 30.4*    277 315  --   --      Recent Labs     01/28/20  0505 01/29/20  0430 01/29/20  1552 01/30/20  0510    137  --  142   K 4.2 3.4* 3.7 3.8   CL 99 96*  --  103   CO2 28 26  --  25   BUN 30* 29*  --  30*   CREATININE 0.9 0.9  --  0.9   CALCIUM 8.7 8.4*  --  8.3*     No results for input(s): AST, ALT, BILIDIR, BILITOT, ALKPHOS in the last 72 hours. No results for input(s): INR in the last 72 hours. No results for input(s): Eugune Ran in the last 72 hours. Urinalysis:      Lab Results   Component Value Date    NITRU Negative 01/23/2020    WBCUA 2-5 01/23/2020    BACTERIA NONE 01/23/2020    RBCUA NONE 01/23/2020    BLOODU TRACE-INTACT 01/23/2020    SPECGRAV <=1.005 01/23/2020    GLUCOSEU Negative 01/23/2020       Radiology:  XR CHEST PORTABLE   Final Result      Improved aeration bilaterally. Interstitial and hazy opacities persist   in lung bases. Continued follow-up may be helpful for further   evaluation. US THORACENTESIS   Final Result   Prominent left pleural effusion. Small right pleural effusion. See procedural or operative notes for details related to the   thoracentesis. XR CHEST PORTABLE   Final Result   Stable postoperative chest with the CHF. XR CHEST PORTABLE   Final Result   Stable postoperative chest with persistent mild CHF. XR CHEST PORTABLE   Final Result   Interval placement of a left-sided PICC with the tip   overlying the cavoatrial junction. Otherwise no significant change   from previous exam.                     XR CHEST PORTABLE   Final Result   Stable postoperative chest with  CHF. XR CHEST PORTABLE   Final Result   Bibasilar atelectasis with a small left-sided effusion, but no   evidence of cardiac enlargement or decompensation. Stable and uncomplicated appearance of support measures. XR CHEST PORTABLE   Final Result   Extubated.    Postoperative changes. Two mediastinal drains and left pleural catheter stable in position. Bilateral pleural effusions and atelectasis, worse on the right   compared to the previous study. XR CHEST PORTABLE   Final Result      1. Postoperative changes from recent CABG. 2. Bibasilar opacities, somewhat increased along the left lung base   likely represent atelectasis. Pneumonia cannot be excluded. 3. The life support lines and tubes appear well positioned. US DUP LOWER EXTREMITY MAPPING BILAT VENOUS   Final Result   Mapping of the right and left great saphenous veins as detailed in the   body of the report. US CAROTID ARTERY BILATERAL   Final Result   Spectral Doppler sonographic findings consistent with the presence of   an intra-aortic balloon pump. Mild bilateral carotid atherosclerosis. Estimated stenosis in the right and left internal carotid arteries is   0-49% by NASCET criteria. VL ERROL BILATERAL LIMITED 1-2 LEVELS   Final Result      CT Chest WO Contrast   Final Result      1. Small bilateral pleural effusions and bibasilar atelectasis right   more than left. 2. Aorta and pulmonary arteries have normal size. Mild CAD. 3. Probable few tiny noncalcified gallstones.                XR CHEST PORTABLE   Final Result   Small amount of pleural thickening left costophrenic angle               XR CHEST PORTABLE    (Results Pending)           Assessment/Plan:    Active Hospital Problems    Diagnosis    Ischemic cardiomyopathy [I25.5]    Atrial fibrillation with RVR ([address] [I48.91]    CAD in native artery [I25.10]    Non-ST elevation MI (NSTEMI) ([address] [I21.4]    Chest pain [R07.9]     66 yo male hx ex smoker, vit d/b12 def, came to hospital with chest pain elevated trop, nstemi and s/p cath showing severe multivessel CAD, severe CMP EF 20% needing IABP, severe MR,     Active Hospital Problems    Diagnosis Date Noted    Ischemic cardiomyopathy [I25.5] 01/28/2020    Atrial

## 2020-01-30 NOTE — PLAN OF CARE
Problem: Risk for Impaired Skin Integrity  Goal: Tissue integrity - skin and mucous membranes  Description  Structural intactness and normal physiological function of skin and  mucous membranes.   Outcome: Met This Shift     Problem: Pain:  Goal: Pain level will decrease  Description  Pain level will decrease  Outcome: Met This Shift  Goal: Control of acute pain  Description  Control of acute pain  Outcome: Met This Shift

## 2020-01-30 NOTE — PROGRESS NOTES
vial 0-6 Units  0-6 Units Subcutaneous TID WC Laqueta , APRN - CNP   1 Units at 01/29/20 1724    insulin lispro (HUMALOG) injection vial 0-3 Units  0-3 Units Subcutaneous Nightly Larafaelta Driver, APRN - CNP   1 Units at 01/29/20 2228    0.9 % sodium chloride infusion admixture  25 mL Intravenous Q8H Moctezuma Shock, APRN - CNP   Stopped at 01/30/20 3869    And    piperacillin-tazobactam (ZOSYN) 3.375 g in dextrose 5 % 100 mL IVPB extended infusion (mini-bag)  3.375 g Intravenous Q8H Mo[place]ezuma Shock, APRN - CNP   Stopped at 01/30/20 8253    perflutren lipid microspheres (DEFINITY) injection 1.65 mg  1.5 mL Intravenous ONCE PRN Mo[place]ezuma Shock, APRN - CNP        losartan (COZAAR) tablet 25 mg  25 mg Oral Daily Drake Rahul, DO   25 mg at 01/30/20 0919    formoterol (PERFOROMIST) nebulizer solution 20 mcg  20 mcg Nebulization Q12H Hannah Pernell, APRN   20 mcg at 01/30/20 0930    budesonide (PULMICORT) nebulizer suspension 500 mcg  500 mcg Nebulization BID Gilman Pernell, APRN   500 mcg at 01/30/20 2402    bumetanide (BUMEX) injection 1 mg  1 mg Intravenous Q12H Shaheen Marina, DO   1 mg at 01/30/20 0105    amiodarone (CORDARONE) tablet 400 mg  400 mg Oral BID Laqueta Driver, APRN - CNP   400 mg at 01/30/20 4811    metoprolol succinate (TOPROL XL) extended release tablet 25 mg  25 mg Oral Daily Laqueta Driver, APRN - CNP   25 mg at 01/30/20 6857    sodium chloride flush 0.9 % injection 10 mL  10 mL Intravenous PRN Laqueta Driver, APRN - CNP   10 mL at 01/30/20 0516    heparin flush 100 UNIT/ML injection 300 Units  3 mL Intravenous 2 times per day Laqueta Driver, APRN - CNP   300 Units at 01/30/20 7478    heparin flush 100 UNIT/ML injection 300 Units  3 mL Intracatheter PRN Laqueta Driver, APRN - CNP        heparin 25,000 units in dextrose 5% 250 mL infusion  12 Units/kg/hr Intravenous Continuous Laqueta , APRN - CNP 13.2 mL/hr at 01/30/20 0640 16 Units/kg/hr at 01/30/20 0640    sodium chloride flush 0.9 % injection 10 mL  10 mL Intravenous 2 times per day Norman Toussaint, APRN - CNP   10 mL at 01/30/20 0119    sodium chloride flush 0.9 % injection 10 mL  10 mL Intravenous PRN Norman Toussaint, APRN - CNP   10 mL at 01/30/20 0105    magnesium oxide (MAG-OX) tablet 400 mg  400 mg Oral Daily Norman Toussaint, APRN - CNP   400 mg at 01/30/20 0919    ipratropium-albuterol (DUONEB) nebulizer solution 1 ampule  1 ampule Inhalation Q4H WA Norman Toussaint, APRN - CNP   1 ampule at 01/30/20 5505    insulin glargine (LANTUS) injection vial 12 Units  0.15 Units/kg Subcutaneous Nightly Norman Toussaint, APRN - CNP   12 Units at 01/29/20 2228    glucose (GLUTOSE) 40 % oral gel 15 g  15 g Oral PRN Norman Toussaint, APRN - CNP        dextrose 50 % IV solution  12.5 g Intravenous PRN Norman Toussaint, APRN - CNP        glucagon (rDNA) injection 1 mg  1 mg Intramuscular PRN Norman Toussaint, APRN - CNP        dextrose 5 % solution  100 mL/hr Intravenous PRN Norman Toussaint, APRN - CNP        tamsulosin (FLOMAX) capsule 0.4 mg  0.4 mg Oral Daily Norman Toussaint, APRN - CNP   0.4 mg at 01/30/20 1452    atorvastatin (LIPITOR) tablet 80 mg  80 mg Oral Nightly Norman Toussaint, APRN - CNP   80 mg at 01/29/20 2230       Review of systems:   Heart: as above   Lungs: as above   Eyes: denies changes in vision or discharge. Ears: denies changes in hearing or pain. Nose: denies epistaxis or masses   Throat: denies sore throat or trouble swallowing. Neuro: denies numbness, tingling, tremors. Skin: denies rashes or itching. : denies hematuria, dysuria   GI: denies vomiting, diarrhea   Psych: denies mood changed, anxiety, depression. Physical Exam   BP (!) 130/40   Pulse 76   Temp 97.4 °F (36.3 °C) (Temporal)   Resp 20   Ht 5' 7\" (1.702 m)   Wt 182 lb 12.8 oz (82.9 kg)   SpO2 91%   BMI 28.63 kg/m²   Constitutional: Oriented to person, place, and time. No distress. Well developed.   Head:

## 2020-01-30 NOTE — PLAN OF CARE
Problem: Risk for Impaired Skin Integrity  Goal: Tissue integrity - skin and mucous membranes  Description  Structural intactness and normal physiological function of skin and  mucous membranes.   1/30/2020 1818 by Cody Cohen RN  Outcome: Met This Shift  1/30/2020 1335 by Juan Carlos Blankenship RN  Outcome: Met This Shift     Problem: Coping:  Goal: Ability to verbalize feelings about condition will improve  Description  Ability to verbalize feelings about condition will improve  Outcome: Met This Shift  Goal: Ability to identify strategies to decrease anxiety will improve  Description  Ability to identify strategies to decrease anxiety will improve  Outcome: Met This Shift     Problem: Health Behavior:  Goal: Ability to identify changes in lifestyle to reduce recurrence of condition will improve  Description  Ability to identify changes in lifestyle to reduce recurrence of condition will improve  Outcome: Met This Shift     Problem: Nutritional:  Goal: Ability to identify appropriate dietary choices will improve  Description  Ability to identify appropriate dietary choices will improve  Outcome: Met This Shift     Problem: Respiratory:  Goal: Ability to maintain adequate ventilation will improve  Description  Ability to maintain adequate ventilation will improve  Outcome: Met This Shift     Problem: Pain:  Goal: Pain level will decrease  Description  Pain level will decrease  1/30/2020 1335 by Juan Carlos Blankenship RN  Outcome: Met This Shift  Goal: Control of acute pain  Description  Control of acute pain  1/30/2020 1335 by Juan Carlos Blankenship RN  Outcome: Met This Shift

## 2020-01-30 NOTE — PROGRESS NOTES
POD#6 Awake, much more alert this am. Still with some confusion, but alert to self, place and situation. C/o SOB. Denies CP, palpitations, dizziness/lightheadedness. Vitals:    01/30/20 0302 01/30/20 0648 01/30/20 0730 01/30/20 0830   BP: 130/60   (!) 130/40   Pulse: 77   76   Resp: 24   20   Temp: 98 °F (36.7 °C)   97.4 °F (36.3 °C)   TempSrc: Temporal   Temporal   SpO2: 94%  95% 91%   Weight:  182 lb 12.8 oz (82.9 kg)     Height:         O2: 4L/NC      Intake/Output Summary (Last 24 hours) at 1/30/2020 0923  Last data filed at 1/30/2020 1293  Gross per 24 hour   Intake 839 ml   Output 1680 ml   Net -841 ml       +BM 1/30  UO: 880mL/8hr       Recent Labs     01/28/20  0505 01/29/20  0430 01/30/20  0510   WBC 17.0* 16.4* 17.0*   HGB 10.6* 9.8* 9.9*   HCT 32.4* 30.0* 30.2*    277 315      Recent Labs     01/28/20  0505 01/29/20  0430 01/30/20  0510   BUN 30* 29* 30*   CREATININE 0.9 0.9 0.9           Telemetry: NSR        PE  Cardiac: RRR  Lungs: decreased bases  Chest incision with intact GIGI DSD. Sternum stable. Prior chest tube site incisions C/D/I, no erythema with intact sutures. Epicardial pacing wires present and secure.    Abd: Softly distended, nontender, +BS  Ext: Incisions C/D/I, approximated, no erythema, +minmal LE edema        A/P:   Stable s/p CABG x 2, POD#6  --Increase activity as tolerated  --Continue knee high JO ANN hose/pcds/progressive ambulation  --continue epicardial pacing wires for now     Acute blood loss anemia secondary to open heart surgery  --stable     PAF (intermitent afib 1/25 - 1/28)  --now maintaining NSR--tolerating BB and oral amio  --on heparin infusion--will need OAC--continue heparin infusion until after completion of possible bilateral thoracenteses, will transition to eliquis at that time (CHADVASC score at least 5)     Prolonged postoperative respiratory insufficiency  -- wean oxygen to keep SpO2 greater than or equal to 92%  --continue duonebs with ezpap  --encourage

## 2020-01-31 ENCOUNTER — APPOINTMENT (OUTPATIENT)
Dept: GENERAL RADIOLOGY | Age: 75
DRG: 233 | End: 2020-01-31
Attending: INTERNAL MEDICINE
Payer: MEDICARE

## 2020-01-31 LAB
ANION GAP SERPL CALCULATED.3IONS-SCNC: 16 MMOL/L (ref 7–16)
APTT: 31.1 SEC (ref 24.5–35.1)
BLOOD BANK DISPENSE STATUS: NORMAL
BLOOD BANK PRODUCT CODE: NORMAL
BPU ID: NORMAL
BUN BLDV-MCNC: 26 MG/DL (ref 8–23)
CALCIUM SERPL-MCNC: 8.5 MG/DL (ref 8.6–10.2)
CHLORIDE BLD-SCNC: 102 MMOL/L (ref 98–107)
CO2: 25 MMOL/L (ref 22–29)
CREAT SERPL-MCNC: 1 MG/DL (ref 0.7–1.2)
CULTURE, RESPIRATORY: ABNORMAL
CULTURE, RESPIRATORY: ABNORMAL
DESCRIPTION BLOOD BANK: NORMAL
GFR AFRICAN AMERICAN: >60
GFR NON-AFRICAN AMERICAN: >60 ML/MIN/1.73
GLUCOSE BLD-MCNC: 126 MG/DL (ref 74–99)
HCT VFR BLD CALC: 27.1 % (ref 37–54)
HCT VFR BLD CALC: 31.9 % (ref 37–54)
HCT VFR BLD CALC: 33.7 % (ref 37–54)
HEMOGLOBIN: 10 G/DL (ref 12.5–16.5)
HEMOGLOBIN: 10.8 G/DL (ref 12.5–16.5)
HEMOGLOBIN: 8.5 G/DL (ref 12.5–16.5)
MAGNESIUM: 2.3 MG/DL (ref 1.6–2.6)
MCH RBC QN AUTO: 27.6 PG (ref 26–35)
MCHC RBC AUTO-ENTMCNC: 31.3 % (ref 32–34.5)
MCV RBC AUTO: 88.1 FL (ref 80–99.9)
METER GLUCOSE: 117 MG/DL (ref 74–99)
METER GLUCOSE: 162 MG/DL (ref 74–99)
METER GLUCOSE: 171 MG/DL (ref 74–99)
METER GLUCOSE: 177 MG/DL (ref 74–99)
ORGANISM: ABNORMAL
PDW BLD-RTO: 14.5 FL (ref 11.5–15)
PLATELET # BLD: 339 E9/L (ref 130–450)
PMV BLD AUTO: 11.2 FL (ref 7–12)
POTASSIUM SERPL-SCNC: 4 MMOL/L (ref 3.5–5)
RBC # BLD: 3.62 E12/L (ref 3.8–5.8)
SMEAR, RESPIRATORY: ABNORMAL
SODIUM BLD-SCNC: 143 MMOL/L (ref 132–146)
WBC # BLD: 16.7 E9/L (ref 4.5–11.5)

## 2020-01-31 PROCEDURE — 97116 GAIT TRAINING THERAPY: CPT

## 2020-01-31 PROCEDURE — 6370000000 HC RX 637 (ALT 250 FOR IP): Performed by: NURSE PRACTITIONER

## 2020-01-31 PROCEDURE — 82962 GLUCOSE BLOOD TEST: CPT

## 2020-01-31 PROCEDURE — 83735 ASSAY OF MAGNESIUM: CPT

## 2020-01-31 PROCEDURE — 6360000002 HC RX W HCPCS: Performed by: CLINICAL NURSE SPECIALIST

## 2020-01-31 PROCEDURE — 94640 AIRWAY INHALATION TREATMENT: CPT

## 2020-01-31 PROCEDURE — 2580000003 HC RX 258: Performed by: NURSE PRACTITIONER

## 2020-01-31 PROCEDURE — 99233 SBSQ HOSP IP/OBS HIGH 50: CPT | Performed by: INTERNAL MEDICINE

## 2020-01-31 PROCEDURE — 6370000000 HC RX 637 (ALT 250 FOR IP): Performed by: INTERNAL MEDICINE

## 2020-01-31 PROCEDURE — 80048 BASIC METABOLIC PNL TOTAL CA: CPT

## 2020-01-31 PROCEDURE — 2140000000 HC CCU INTERMEDIATE R&B

## 2020-01-31 PROCEDURE — 2700000000 HC OXYGEN THERAPY PER DAY

## 2020-01-31 PROCEDURE — 93798 PHYS/QHP OP CAR RHAB W/ECG: CPT

## 2020-01-31 PROCEDURE — 85730 THROMBOPLASTIN TIME PARTIAL: CPT

## 2020-01-31 PROCEDURE — 85014 HEMATOCRIT: CPT

## 2020-01-31 PROCEDURE — 85018 HEMOGLOBIN: CPT

## 2020-01-31 PROCEDURE — 94660 CPAP INITIATION&MGMT: CPT

## 2020-01-31 PROCEDURE — 71045 X-RAY EXAM CHEST 1 VIEW: CPT

## 2020-01-31 PROCEDURE — 97530 THERAPEUTIC ACTIVITIES: CPT

## 2020-01-31 PROCEDURE — 36415 COLL VENOUS BLD VENIPUNCTURE: CPT

## 2020-01-31 PROCEDURE — 6360000002 HC RX W HCPCS: Performed by: NURSE PRACTITIONER

## 2020-01-31 PROCEDURE — 2580000003 HC RX 258: Performed by: THORACIC SURGERY (CARDIOTHORACIC VASCULAR SURGERY)

## 2020-01-31 PROCEDURE — 85027 COMPLETE CBC AUTOMATED: CPT

## 2020-01-31 PROCEDURE — 36592 COLLECT BLOOD FROM PICC: CPT

## 2020-01-31 PROCEDURE — 2500000003 HC RX 250 WO HCPCS: Performed by: INTERNAL MEDICINE

## 2020-01-31 PROCEDURE — 36430 TRANSFUSION BLD/BLD COMPNT: CPT

## 2020-01-31 RX ADMIN — SENNOSIDES 17.2 MG: 8.6 TABLET, FILM COATED ORAL at 20:22

## 2020-01-31 RX ADMIN — MAGNESIUM GLUCONATE 500 MG ORAL TABLET 400 MG: 500 TABLET ORAL at 09:08

## 2020-01-31 RX ADMIN — ATORVASTATIN CALCIUM 80 MG: 40 TABLET, FILM COATED ORAL at 20:22

## 2020-01-31 RX ADMIN — SODIUM CHLORIDE 25 ML: 9 INJECTION, SOLUTION INTRAVENOUS at 17:47

## 2020-01-31 RX ADMIN — INSULIN LISPRO 1 UNITS: 100 INJECTION, SOLUTION INTRAVENOUS; SUBCUTANEOUS at 12:23

## 2020-01-31 RX ADMIN — SODIUM CHLORIDE, PRESERVATIVE FREE 10 ML: 5 INJECTION INTRAVENOUS at 20:22

## 2020-01-31 RX ADMIN — BUDESONIDE 500 MCG: 0.5 SUSPENSION RESPIRATORY (INHALATION) at 06:33

## 2020-01-31 RX ADMIN — FERROUS SULFATE TAB 325 MG (65 MG ELEMENTAL FE) 325 MG: 325 (65 FE) TAB at 09:08

## 2020-01-31 RX ADMIN — SODIUM CHLORIDE, PRESERVATIVE FREE 10 ML: 5 INJECTION INTRAVENOUS at 09:08

## 2020-01-31 RX ADMIN — PANTOPRAZOLE SODIUM 40 MG: 40 TABLET, DELAYED RELEASE ORAL at 09:08

## 2020-01-31 RX ADMIN — PIPERACILLIN AND TAZOBACTAM 3.38 G: 3; .375 INJECTION, POWDER, LYOPHILIZED, FOR SOLUTION INTRAVENOUS at 12:23

## 2020-01-31 RX ADMIN — ASPIRIN 81 MG: 81 TABLET, COATED ORAL at 09:08

## 2020-01-31 RX ADMIN — SODIUM CHLORIDE 25 ML: 9 INJECTION, SOLUTION INTRAVENOUS at 09:11

## 2020-01-31 RX ADMIN — LOSARTAN POTASSIUM 25 MG: 25 TABLET, FILM COATED ORAL at 09:08

## 2020-01-31 RX ADMIN — INSULIN GLARGINE 12 UNITS: 100 INJECTION, SOLUTION SUBCUTANEOUS at 20:31

## 2020-01-31 RX ADMIN — BUMETANIDE 1 MG: 0.25 INJECTION INTRAMUSCULAR; INTRAVENOUS at 12:23

## 2020-01-31 RX ADMIN — Medication 300 UNITS: at 09:08

## 2020-01-31 RX ADMIN — ACETAMINOPHEN 650 MG: 325 TABLET, FILM COATED ORAL at 05:26

## 2020-01-31 RX ADMIN — IPRATROPIUM BROMIDE AND ALBUTEROL SULFATE 1 AMPULE: 2.5; .5 SOLUTION RESPIRATORY (INHALATION) at 10:26

## 2020-01-31 RX ADMIN — METOPROLOL SUCCINATE 25 MG: 25 TABLET, EXTENDED RELEASE ORAL at 09:08

## 2020-01-31 RX ADMIN — PIPERACILLIN AND TAZOBACTAM 3.38 G: 3; .375 INJECTION, POWDER, LYOPHILIZED, FOR SOLUTION INTRAVENOUS at 05:19

## 2020-01-31 RX ADMIN — INSULIN LISPRO 1 UNITS: 100 INJECTION, SOLUTION INTRAVENOUS; SUBCUTANEOUS at 17:09

## 2020-01-31 RX ADMIN — FORMOTEROL FUMARATE DIHYDRATE 20 MCG: 20 SOLUTION RESPIRATORY (INHALATION) at 06:34

## 2020-01-31 RX ADMIN — BISACODYL 10 MG: 10 SUPPOSITORY RECTAL at 12:23

## 2020-01-31 RX ADMIN — BUMETANIDE 1 MG: 0.25 INJECTION INTRAMUSCULAR; INTRAVENOUS at 01:04

## 2020-01-31 RX ADMIN — IPRATROPIUM BROMIDE AND ALBUTEROL SULFATE 1 AMPULE: 2.5; .5 SOLUTION RESPIRATORY (INHALATION) at 16:58

## 2020-01-31 RX ADMIN — DOCUSATE SODIUM 100 MG: 100 CAPSULE, LIQUID FILLED ORAL at 20:22

## 2020-01-31 RX ADMIN — TAMSULOSIN HYDROCHLORIDE 0.4 MG: 0.4 CAPSULE ORAL at 09:08

## 2020-01-31 RX ADMIN — Medication 300 UNITS: at 20:22

## 2020-01-31 RX ADMIN — AMIODARONE HYDROCHLORIDE 400 MG: 200 TABLET ORAL at 09:08

## 2020-01-31 RX ADMIN — FERROUS SULFATE TAB 325 MG (65 MG ELEMENTAL FE) 325 MG: 325 (65 FE) TAB at 17:09

## 2020-01-31 RX ADMIN — FOLIC ACID 1 MG: 1 TABLET ORAL at 09:08

## 2020-01-31 RX ADMIN — PIPERACILLIN AND TAZOBACTAM 3.38 G: 3; .375 INJECTION, POWDER, LYOPHILIZED, FOR SOLUTION INTRAVENOUS at 20:22

## 2020-01-31 RX ADMIN — INSULIN LISPRO 1 UNITS: 100 INJECTION, SOLUTION INTRAVENOUS; SUBCUTANEOUS at 20:32

## 2020-01-31 RX ADMIN — SODIUM CHLORIDE 25 ML: 9 INJECTION, SOLUTION INTRAVENOUS at 01:14

## 2020-01-31 RX ADMIN — Medication 500 MG: at 09:08

## 2020-01-31 RX ADMIN — DOCUSATE SODIUM 100 MG: 100 CAPSULE, LIQUID FILLED ORAL at 09:08

## 2020-01-31 RX ADMIN — SODIUM CHLORIDE 20 ML: 9 INJECTION, SOLUTION INTRAVENOUS at 02:19

## 2020-01-31 RX ADMIN — AMIODARONE HYDROCHLORIDE 400 MG: 200 TABLET ORAL at 20:22

## 2020-01-31 RX ADMIN — Medication 500 MG: at 20:23

## 2020-01-31 ASSESSMENT — PAIN SCALES - GENERAL
PAINLEVEL_OUTOF10: 0
PAINLEVEL_OUTOF10: 3
PAINLEVEL_OUTOF10: 0

## 2020-01-31 NOTE — PROGRESS NOTES
Physical Therapy  Facility/Department: [facility] 3NE CVIC  Daily Treatment Note  NAME: Brandee De La Rosa  : 1945  MRN: [id number]    Date of Service: 2020   Evaluating Therapist: Corinna Nina PT, DPT     ROOM #: [id number]  DIAGNOSIS: CAD  PRECAUTIONS: Falls, sternal precautions, external pacer, arterial line, O2, TSM  PMHx: MI  PROCEDURES:  CABG x 2     Social:  Pt lives with wife in a 2 floor plan, 1st floor setup with level entry. Prior to admission pt walked with no device and was Independent. Pt reported being very active.       Initial Evaluation  Date: 20 Treatment  Date: 2020    Short Term/ Long Term   Goals   AM-PAC 6 Clicks 41/33 33/92      Does pt have pain? No c/o pain at rest  No c/o pain at rest     Bed Mobility  Rolling: MaxA  Supine to sit: NT  Sit to supine: MaxA x 2  Scooting: NT Rolling: Min A  Supine to sit: Max A  Sit to supine: NT  Scooting: Min A Callum   Transfers Sit to stand: MaxA   Stand to sit: MaxA  Stand pivot: ModA no device Sit to stand: Mod A   Stand to sit: Min A  Stand pivot: Min A no AD Independent   Ambulation   60 feet with ModA with no device 200 feet with no AD with Min A >400 feet Independently   Stair negotiation: ascended and descended NT NT, patient not up to attempting >4 steps with 1 rail with Mod Independent   BLE ROM WNL       BLE strength Grossly 4/5   Increase by 1/3 MMT grade   Balance Sitting: Callum  Standing: ModA no device Sitting: SBA  Standing: Min A with no AD Sitting: Independent  Standing: Independent      Pt is alert and oriented x 2, person and place. Pleasantly confused with details but able to follow commands well throughout treatment.   Sensation: Denied N/T  Edema: None noted    Patient education  Pt was educated on sternal precautions, safety with mobility, transfers, gait, proper breathing techniques during mobility    Patient response to education:   Pt verbalized understanding Pt demonstrated skill Pt requires further education in this

## 2020-01-31 NOTE — PROGRESS NOTES
Hospitalist Progress Note      PCP: Nilsa Padilla DO    Date of Admission: 1/22/2020    Chief Complaint: medical management    Hospital Course:   Seen by cardiology. Had a left heart cath performed. 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 Cardiothoracic surgery consulted and performed CABG x2 (LIMA-LAD, SVG-PDA). With time, the intra-aortic balloon pump was removed. Patient placed in CVICU. With time patient was able to be transferred out. Pulmonology was consulted secondary to continued shortness of breath. Found to have bilateral pleural effusions. To possibly have thoracentesis on 1/30. Diuresed with Bumex. Became delirious with time. Narcotics discontinued and sitter placed at bedside. Delirium improved with time. Thoracentesis performed on 1/30 and had 850 cc of bloody/hemorrhagic fluid removed from his left chest.  1/31/20: anemia improving. BGL stable. Vital signs stable. Leukocytosis stable. Patient on zosyn q8h. Nebulizers, bumex 1 mg q12h. Off of heparin for now d/t bloody pleural fluid. Per cardio-continue toprol, ARB, bumex; switch to NOAC before discharge, patient does not need a Lifevest. Per pulm-will need outpatient PFT. Subjective:   Patient sitting up in bed. He has no complaints at the present time. No BM in two days.      Medications:  Reviewed    Infusion Medications    [Held by provider] heparin (porcine) 16 Units/kg/hr (01/30/20 0640)    dextrose       Scheduled Medications    senna  2 tablet Oral Nightly    docusate sodium  100 mg Oral BID    aspirin  81 mg Oral Daily    ferrous sulfate  325 mg Oral BID     vitamin C  500 mg Oral BID    folic acid  1 mg Oral Daily    pantoprazole  40 mg Oral Daily    insulin lispro  0-6 Units Subcutaneous TID     insulin lispro  0-3 Units Subcutaneous Nightly    sodium chloride  25 mL Intravenous Q8H    And    piperacillin-tazobactam  3.375 g Intravenous Refill: Brisk,< 3 seconds   Peripheral Pulses: +2 palpable, equal bilaterally       Labs:   Recent Labs     01/29/20  0430 01/30/20  0510  01/30/20  2042 01/31/20  0115 01/31/20  0650   WBC 16.4* 17.0*  --   --   --  16.7*   HGB 9.8* 9.9*   < > 8.5* 8.5* 10.0*   HCT 30.0* 30.2*   < > 27.3* 27.1* 31.9*    315  --   --   --  339    < > = values in this interval not displayed. Recent Labs     01/29/20  0430 01/29/20  1552 01/30/20  0510 01/31/20  0650     --  142 143   K 3.4* 3.7 3.8 4.0   CL 96*  --  103 102   CO2 26  --  25 25   BUN 29*  --  30* 26*   CREATININE 0.9  --  0.9 1.0   CALCIUM 8.4*  --  8.3* 8.5*     No results for input(s): AST, ALT, BILIDIR, BILITOT, ALKPHOS in the last 72 hours. No results for input(s): INR in the last 72 hours. No results for input(s): Ko Rhett in the last 72 hours.     Assessment/Plan:    Active Hospital Problems    Diagnosis Date Noted    Ischemic cardiomyopathy [I25.5] 01/28/2020    Atrial fibrillation with RVR ([facility] [address] [I48.91] 01/28/2020    CAD in native artery [I25.10] 01/22/2020    Non-ST elevation MI (NSTEMI) ([facility]) [I21.4]     Chest pain [R07.9]      Plan  Hold heparin today  Nebulizer  Supplemental oxygen PRN  Daily weights  I/Os  Medications as ordered  Pain control-judicious narcotic use  Monitor bowel function  IS/C&DB while awake  OOB for meals  Monitor BGL  Insulin sliding scale  lantus  Monitor labs  Transfuse PRN    DVT Prophylaxis: SCDs  Diet: Diet NPO Effective Now Exceptions are: Sips with Meds  Code Status: Full Code    PT/OT Eval Status: active and ongoing    Dispo - per primary    Arlen Jones CNP

## 2020-01-31 NOTE — PROGRESS NOTES
Ricci Kan M.D.,[facility]  Germain Celis D.O., F.A.C.O.I., Moon Henao M.D. Stoney Flores M.D., Sindhu Araujo M.D. Monica Gutierrez D.O. Daily Pulmonary Progress Note    Patient:  Yusuf Amos 67 y.o. male MRN: [id number]     Date of Service: 1/31/2020      Synopsis     We are following patient for acute respiratory failure status post CABG    \"CC\" shortness of breath    Code status: Full      Subjective      Patient was seen and examined. Patient is sitting up in chair no labored breathing in NAD . He is currently on 3 L nasal cannula he reports that he has less shortness of breath status post thoracentesis. He is not having any  hallucinations today. He is alert and oriented x3. Denies cough. Review of Systems:  Constitutional: Denies fever, weight loss, night sweats, and fatigue  Skin: Denies pigmentation, dark lesions, and rashes   HEENT: Denies hearing loss, tinnitus, ear drainage, epistaxis, sore throat, and hoarseness. Cardiovascular: Denies palpitations, chest pain, and chest pressure. Respiratory: Denies cough,+ dyspnea at rest, hemoptysis, apnea, and choking.   Gastrointestinal: Denies nausea, vomiting, poor appetite, diarrhea, heartburn or reflux  Genitourinary: Denies dysuria, frequency, urgency or hematuria  Musculoskeletal: Denies myalgias, muscle weakness, and bone pain  24-hour events:  Left thoracentesis yesterday     Objective   Vitals: BP (!) 112/56   Pulse 68   Temp 97.8 °F (36.6 °C) (Temporal)   Resp 18   Ht 5' 7\" (1.702 m)   Wt 182 lb 1.6 oz (82.6 kg)   SpO2 96%   BMI 28.52 kg/m²     I/O:    Intake/Output Summary (Last 24 hours) at 1/31/2020 1248  Last data filed at 1/31/2020 0658  Gross per 24 hour   Intake 985.33 ml   Output 800 ml   Net 185.33 ml       Vent Information  $Ventilation: $Initial Day  Skin Assessment: Clean, dry, & intact  Vt Ordered: 540 mL  Rate Set: 0 bmp  Peak Flow: 57 L/min  Pressure Support: 8 cmH20  FiO2 : 50 delirium  6. Leukocytosis  7. Obstipation  8. PAF heparin infusion will need [address]  9. Hx CAD          Plan:   10.  incentive spirometry and pep flutter  11. Continue Zosyn every 8 hours  12. Perforomist and Pulmicort  13. Bronchodilator every 4 hours while awake  14. Bumex 1 mg every 12 hours , strict I&O  15. Keep pulse oximetry greater than 92%, currently on 3 L   16. Will need outpatient PFT  17. Await pleural fluid studies- no growth   18. Follow chest x-ray will check today   19. Pleural fluid bloody 850 cc ,heparin gtt on hold till 4 pm , received 1 U PRBC yesterday , transition to [address] [place] [address] ?  20. Respiratory culture gram negative rods await sensitivity       This plan of care was reviewed in collaboration with Dr. Severo Jenny   Electronically signed by SATHYA Almonte on 1/31/2020 at 12:48 PM      I personally saw, examined, and cared for the patient. Labs, medications, radiographs reviewed. I agree with history exam and plans detailed in NP note. Thoracentesis yesterday with 850 cc bloody fluid removed. Patient given 1 unit of packed red blood cells overnight. Oxygenation improved today as well as chest x-ray. If hemoglobin remained stabilized then can resume anticoagulation in next day from a pulmonary standpoint. Respiratory culture with heavy growth of Klebsiella. Continue Zosyn.     Electronically signed by Jeromy Lacey DO on 1/31/2020 at 4:49 PM

## 2020-01-31 NOTE — CARE COORDINATION
1/31/2020  SW will follow and assist with transition of care. Discharge plan remains [address] [place] [address] at discharge if appropriate or home health.

## 2020-01-31 NOTE — PROGRESS NOTES
(HUMALOG) injection vial 0-6 Units  0-6 Units Subcutaneous TID WC Pascual Diones, APRN - CNP   1 Units at 01/30/20 1654    insulin lispro (HUMALOG) injection vial 0-3 Units  0-3 Units Subcutaneous Nightly Pascual Diones, APRN - CNP   1 Units at 01/30/20 2109    0.9 % sodium chloride infusion admixture  25 mL Intravenous Q8H Edie Sampson, APRN - CNP 12.5 mL/hr at 01/31/20 0911 25 mL at 01/31/20 0911    And    piperacillin-tazobactam (ZOSYN) 3.375 g in dextrose 5 % 100 mL IVPB extended infusion (mini-bag)  3.375 g Intravenous Q8H Edie Sampson, APRN - CNP 25 mL/hr at 01/31/20 1223 3.375 g at 01/31/20 1223    losartan (COZAAR) tablet 25 mg  25 mg Oral Daily Ana Crouch, DO   25 mg at 01/31/20 0908    formoterol (PERFOROMIST) nebulizer solution 20 mcg  20 mcg Nebulization Q12H Lonell [place], APRN   20 mcg at 01/31/20 3363    budesonide (PULMICORT) nebulizer suspension 500 mcg  500 mcg Nebulization BID Lonell [place], APRN   500 mcg at 01/31/20 5265    bumetanide (BUMEX) injection 1 mg  1 mg Intravenous Q12H Shaheen Marina, DO   1 mg at 01/31/20 1223    amiodarone (CORDARONE) tablet 400 mg  400 mg Oral BID Pascual Diones, APRN - CNP   400 mg at 01/31/20 3763    metoprolol succinate (TOPROL XL) extended release tablet 25 mg  25 mg Oral Daily Pascual Diones, APRN - CNP   25 mg at 01/31/20 0908    sodium chloride flush 0.9 % injection 10 mL  10 mL Intravenous PRN Pascual Diones, APRN - CNP   10 mL at 01/30/20 0516    heparin flush 100 UNIT/ML injection 300 Units  3 mL Intravenous 2 times per day Pascual Diones, APRN - CNP   300 Units at 01/31/20 0908    heparin flush 100 UNIT/ML injection 300 Units  3 mL Intracatheter PRN Pascual Diones, APRN - CNP        [Held by provider] heparin 25,000 units in dextrose 5% 250 mL infusion  12 Units/kg/hr Intravenous Continuous SATHYA Soares CNP 13.2 mL/hr at 01/30/20 0640 16 Units/kg/hr at 01/30/20 0640    sodium chloride flush 0.9 % injection 10 mL  10 mL Intravenous 2 times per day Narvis Reusing, APRN - CNP   10 mL at 01/31/20 0908    sodium chloride flush 0.9 % injection 10 mL  10 mL Intravenous PRN Narvis Reusing, APRN - CNP   10 mL at 01/30/20 1447    magnesium oxide (MAG-OX) tablet 400 mg  400 mg Oral Daily Narvis Reusing, APRN - CNP   400 mg at 01/31/20 0908    ipratropium-albuterol (DUONEB) nebulizer solution 1 ampule  1 ampule Inhalation Q4H WA Narvis Reusing, APRN - CNP   1 ampule at 01/31/20 1026    insulin glargine (LANTUS) injection vial 12 Units  0.15 Units/kg Subcutaneous Nightly Narvis Reusing, APRN - CNP   12 Units at 01/30/20 2109    glucose (GLUTOSE) 40 % oral gel 15 g  15 g Oral PRN Narvis Reusing, APRN - CNP        dextrose 50 % IV solution  12.5 g Intravenous PRN Narvis Reusing, APRN - CNP        glucagon (rDNA) injection 1 mg  1 mg Intramuscular PRN Narvis Reusing, APRN - CNP        dextrose 5 % solution  100 mL/hr Intravenous PRN Narvis Reusing, APRN - CNP        tamsulosin (FLOMAX) capsule 0.4 mg  0.4 mg Oral Daily Narvis Reusing, APRN - CNP   0.4 mg at 01/31/20 0908    atorvastatin (LIPITOR) tablet 80 mg  80 mg Oral Nightly Narvis Reusing, APRN - CNP   80 mg at 01/30/20 2110       Review of systems:   Heart: as above   Lungs: as above   Eyes: denies changes in vision or discharge. Ears: denies changes in hearing or pain. Nose: denies epistaxis or masses   Throat: denies sore throat or trouble swallowing. Neuro: denies numbness, tingling, tremors. Skin: denies rashes or itching. : denies hematuria, dysuria   GI: denies vomiting, diarrhea   Psych: denies mood changed, anxiety, depression. Physical Exam   BP (!) 112/56   Pulse 68   Temp 97.8 °F (36.6 °C) (Temporal)   Resp 18   Ht 5' 7\" (1.702 m)   Wt 182 lb 1.6 oz (82.6 kg)   SpO2 96%   BMI 28.52 kg/m²   Constitutional: Oriented to person, place, and time. No distress. Well developed. Head: Normocephalic and atraumatic.     Neck: Neck supple. No hepatojugular reflux. No JVD present. Carotid bruit is not present. No tracheal deviation present. No thyromegaly present. Cardiovascular: Normal rate, regular rhythm, normal heart sounds. intact distal pulses. No gallop and no friction rub. No murmur heard. Pulmonary: Breath sounds normal. No respiratory distress. No wheezes. No rales. Chest: Effort normal. No tenderness. Abdominal: Soft. Bowel sounds are normal. No distension or mass. No tenderness, rebound or guarding. Musculoskeletal: . No tenderness. No clubbing or cyanosis. Extremitites: Intact distal pulses. No edema  Neurological: Alert and oriented to person, place, and time. Skin: Skin is warm and dry. No rash noted. Not diaphoretic. No erythema. Psychiatric: Normal mood and affect. Behavior is normal.   Lymphadenopathy: No cervical adenopathy. No groin adenopathy.       CBC:   Lab Results   Component Value Date    WBC 16.7 01/31/2020    RBC 3.62 01/31/2020    HGB 10.0 01/31/2020    HCT 31.9 01/31/2020    MCV 88.1 01/31/2020    MCH 27.6 01/31/2020    MCHC 31.3 01/31/2020    RDW 14.5 01/31/2020     01/31/2020    MPV 11.2 01/31/2020     BMP:   Lab Results   Component Value Date     01/31/2020    K 4.0 01/31/2020    K 4.4 01/24/2020     01/31/2020    CO2 25 01/31/2020    BUN 26 01/31/2020    LABALBU 3.5 01/24/2020    CREATININE 1.0 01/31/2020    CALCIUM 8.5 01/31/2020    GFRAA >60 01/31/2020    LABGLOM >60 01/31/2020     Magnesium:    Lab Results   Component Value Date    MG 2.3 01/31/2020     Cardiac Enzymes:   Lab Results   Component Value Date    TROPONINI 6.45 (H) 01/23/2020      PT/INR:    Lab Results   Component Value Date    PROTIME 12.7 01/24/2020    INR 1.1 01/24/2020     TSH:  No results found for: TSH    Rhythm Strip: NSR    ASSESSMENT & PLAN:    Patient Active Problem List   Diagnosis    CAD in native artery    Non-ST elevation [facility] (NSTEMI) ([facility] [address]    Chest pain    Ischemic cardiomyopathy    Atrial fibrillation with RVR ([facility] [address]     1. CAD-CABG: status post two-vessel CABG with LIMA to LAD and SVG to PDA     Supportive care transitioning to guideline based therapy for his CAD and CMP. 2. CMP: Limited echo suggests LVEF 45-50%. Guideline medications as able. Continue toprol/ARB/lasix. Does not need Lifevest.    3. PAF: In sinus. CHADVASC score at least 5. Will need anticoagulation to prevent CVA. Amio. On IV heparin, switch to NOAC before discharge. Mallory Dong D.O.   Cardiologist  Cardiology, [facility]

## 2020-01-31 NOTE — CARE COORDINATION
[facility] life [facility] order has been d/c'd     [phone]  Met with pt and family in room. Pt ambulated 400ft twice today. They would like pt to go home with [facility] and pt will have family with him 24/7 for 1st 1-2 weeks post dc. Pt has telesitter. Weaned down to 1l/nc. Pt's daughter, Bill Calderon, requested to make sure PT works with pt at home. SW notified.

## 2020-01-31 NOTE — PROGRESS NOTES
Dr Indira Saini notified of hypotension, BP 80/50 taken manually. Orders obtained for CXR and hemoglobin and hematocrit to be drawn. 36- Dr Indira Saini notified of CXR results and H&H 9.6/30.4       Order obtained to recheck H&H at 2000.        1830 After albumin infusion, BP 98/52. Patient remains asymptomatic. 2130- Dr Indira Saini notified of BP 90/36 and H&H now 8.5/27.3; orders obtained to keep patient NPO at midnight, transfuse 1 unit PRBCs and hold anticoagulation.

## 2020-01-31 NOTE — PROGRESS NOTES
POD#7 Awake, alert. No complaints. Denies CP, palpitations, SOB at rest, dizziness/lightheadedness. Vitals:    01/31/20 0211 01/31/20 0240 01/31/20 0526 01/31/20 0633   BP: 120/61 (!) 118/56 122/60    Pulse: 74 72 62    Resp: 20 18 20    Temp: 98.3 °F (36.8 °C) 98.3 °F (36.8 °C) 98 °F (36.7 °C)    TempSrc: Axillary Axillary     SpO2: 94% 95% 94%    Weight:    182 lb 1.6 oz (82.6 kg)   Height:         O2: 3L/NC      Intake/Output Summary (Last 24 hours) at 1/31/2020 0843  Last data filed at 1/31/2020 0658  Gross per 24 hour   Intake 985.33 ml   Output 1050 ml   Net -64.67 ml       +BM 1/30  UO: 500mL/8hr       Recent Labs     01/29/20  0430 01/30/20  0510  01/30/20  2042 01/31/20  0115 01/31/20  0650   WBC 16.4* 17.0*  --   --   --  16.7*   HGB 9.8* 9.9*   < > 8.5* 8.5* 10.0*   HCT 30.0* 30.2*   < > 27.3* 27.1* 31.9*    315  --   --   --  339    < > = values in this interval not displayed. Recent Labs     01/29/20  0430 01/30/20  0510 01/31/20  0650   BUN 29* 30* 26*   CREATININE 0.9 0.9 1.0          Telemetry: NSR        PE  Cardiac: RRR  Lungs: decreased bases  Chest incision with intact GIGI DSD. Sternum stable. Prior chest tube site incisions C/D/I, no erythema with intact sutures. Epicardial pacing wires present and secure.    Abd: Softly distended, nontender, +BS  Ext: Incisions C/D/I, approximated, no erythema, +minmal LE edema        A/P:   Stable s/p CABG x 2, POD#7  --Increase activity as tolerated  --Continue knee high JO ANN hose/pcds/progressive ambulation  --continue epicardial pacing wires for now     Acute blood loss anemia secondary to open heart surgery  --10.1 yesterday post thoracentesis, down to 8.5, transfused 1 UPRBC and hgb 10 this AM  --trend H/H   --continue to hold anticoagulants at this time     PAF (intermitent afib 1/25 - 1/28)  --now maintaining NSR--tolerating BB and oral amio  --heparin infusion on hold post right thoracentesis yesterday--continue to hold at this time with

## 2020-02-01 LAB
ANION GAP SERPL CALCULATED.3IONS-SCNC: 14 MMOL/L (ref 7–16)
APTT: 33.1 SEC (ref 24.5–35.1)
BODY FLUID CULTURE, STERILE: NORMAL
BUN BLDV-MCNC: 24 MG/DL (ref 8–23)
CALCIUM SERPL-MCNC: 8.3 MG/DL (ref 8.6–10.2)
CHLORIDE BLD-SCNC: 99 MMOL/L (ref 98–107)
CO2: 25 MMOL/L (ref 22–29)
CREAT SERPL-MCNC: 0.9 MG/DL (ref 0.7–1.2)
GFR AFRICAN AMERICAN: >60
GFR NON-AFRICAN AMERICAN: >60 ML/MIN/1.73
GLUCOSE BLD-MCNC: 117 MG/DL (ref 74–99)
GRAM STAIN RESULT: NORMAL
HCT VFR BLD CALC: 32 % (ref 37–54)
HEMOGLOBIN: 10.1 G/DL (ref 12.5–16.5)
MAGNESIUM: 2.3 MG/DL (ref 1.6–2.6)
MCH RBC QN AUTO: 28 PG (ref 26–35)
MCHC RBC AUTO-ENTMCNC: 31.6 % (ref 32–34.5)
MCV RBC AUTO: 88.6 FL (ref 80–99.9)
METER GLUCOSE: 150 MG/DL (ref 74–99)
METER GLUCOSE: 173 MG/DL (ref 74–99)
METER GLUCOSE: 191 MG/DL (ref 74–99)
PDW BLD-RTO: 14.6 FL (ref 11.5–15)
PLATELET # BLD: 358 E9/L (ref 130–450)
PMV BLD AUTO: 11.5 FL (ref 7–12)
POTASSIUM SERPL-SCNC: 3.7 MMOL/L (ref 3.5–5)
RBC # BLD: 3.61 E12/L (ref 3.8–5.8)
SODIUM BLD-SCNC: 138 MMOL/L (ref 132–146)
WBC # BLD: 16 E9/L (ref 4.5–11.5)

## 2020-02-01 PROCEDURE — 6370000000 HC RX 637 (ALT 250 FOR IP): Performed by: NURSE PRACTITIONER

## 2020-02-01 PROCEDURE — 83735 ASSAY OF MAGNESIUM: CPT

## 2020-02-01 PROCEDURE — 2700000000 HC OXYGEN THERAPY PER DAY

## 2020-02-01 PROCEDURE — 2580000003 HC RX 258: Performed by: NURSE PRACTITIONER

## 2020-02-01 PROCEDURE — 6360000002 HC RX W HCPCS: Performed by: NURSE PRACTITIONER

## 2020-02-01 PROCEDURE — 2500000003 HC RX 250 WO HCPCS: Performed by: INTERNAL MEDICINE

## 2020-02-01 PROCEDURE — 6360000002 HC RX W HCPCS: Performed by: CLINICAL NURSE SPECIALIST

## 2020-02-01 PROCEDURE — 82962 GLUCOSE BLOOD TEST: CPT

## 2020-02-01 PROCEDURE — 94640 AIRWAY INHALATION TREATMENT: CPT

## 2020-02-01 PROCEDURE — 99232 SBSQ HOSP IP/OBS MODERATE 35: CPT | Performed by: INTERNAL MEDICINE

## 2020-02-01 PROCEDURE — 2140000000 HC CCU INTERMEDIATE R&B

## 2020-02-01 PROCEDURE — 6370000000 HC RX 637 (ALT 250 FOR IP): Performed by: INTERNAL MEDICINE

## 2020-02-01 PROCEDURE — 80048 BASIC METABOLIC PNL TOTAL CA: CPT

## 2020-02-01 PROCEDURE — 93798 PHYS/QHP OP CAR RHAB W/ECG: CPT

## 2020-02-01 PROCEDURE — 85730 THROMBOPLASTIN TIME PARTIAL: CPT

## 2020-02-01 PROCEDURE — 85027 COMPLETE CBC AUTOMATED: CPT

## 2020-02-01 PROCEDURE — 94660 CPAP INITIATION&MGMT: CPT

## 2020-02-01 RX ADMIN — INSULIN GLARGINE 12 UNITS: 100 INJECTION, SOLUTION SUBCUTANEOUS at 21:22

## 2020-02-01 RX ADMIN — SODIUM CHLORIDE 25 ML: 9 INJECTION, SOLUTION INTRAVENOUS at 18:40

## 2020-02-01 RX ADMIN — ATORVASTATIN CALCIUM 80 MG: 40 TABLET, FILM COATED ORAL at 21:16

## 2020-02-01 RX ADMIN — BUMETANIDE 1 MG: 0.25 INJECTION INTRAMUSCULAR; INTRAVENOUS at 17:42

## 2020-02-01 RX ADMIN — SODIUM CHLORIDE, PRESERVATIVE FREE 10 ML: 5 INJECTION INTRAVENOUS at 09:51

## 2020-02-01 RX ADMIN — Medication 300 UNITS: at 09:52

## 2020-02-01 RX ADMIN — BUDESONIDE 500 MCG: 0.5 SUSPENSION RESPIRATORY (INHALATION) at 08:24

## 2020-02-01 RX ADMIN — TAMSULOSIN HYDROCHLORIDE 0.4 MG: 0.4 CAPSULE ORAL at 09:52

## 2020-02-01 RX ADMIN — PIPERACILLIN AND TAZOBACTAM 3.38 G: 3; .375 INJECTION, POWDER, LYOPHILIZED, FOR SOLUTION INTRAVENOUS at 15:01

## 2020-02-01 RX ADMIN — IPRATROPIUM BROMIDE AND ALBUTEROL SULFATE 1 AMPULE: 2.5; .5 SOLUTION RESPIRATORY (INHALATION) at 21:10

## 2020-02-01 RX ADMIN — SODIUM CHLORIDE, PRESERVATIVE FREE 10 ML: 5 INJECTION INTRAVENOUS at 04:11

## 2020-02-01 RX ADMIN — SODIUM CHLORIDE, PRESERVATIVE FREE 10 ML: 5 INJECTION INTRAVENOUS at 21:18

## 2020-02-01 RX ADMIN — SENNOSIDES 17.2 MG: 8.6 TABLET, FILM COATED ORAL at 21:17

## 2020-02-01 RX ADMIN — DOCUSATE SODIUM 100 MG: 100 CAPSULE, LIQUID FILLED ORAL at 21:17

## 2020-02-01 RX ADMIN — FERROUS SULFATE TAB 325 MG (65 MG ELEMENTAL FE) 325 MG: 325 (65 FE) TAB at 09:53

## 2020-02-01 RX ADMIN — AMIODARONE HYDROCHLORIDE 400 MG: 200 TABLET ORAL at 21:17

## 2020-02-01 RX ADMIN — SODIUM CHLORIDE, PRESERVATIVE FREE 10 ML: 5 INJECTION INTRAVENOUS at 04:12

## 2020-02-01 RX ADMIN — PIPERACILLIN AND TAZOBACTAM 3.38 G: 3; .375 INJECTION, POWDER, LYOPHILIZED, FOR SOLUTION INTRAVENOUS at 04:11

## 2020-02-01 RX ADMIN — BUDESONIDE 500 MCG: 0.5 SUSPENSION RESPIRATORY (INHALATION) at 21:10

## 2020-02-01 RX ADMIN — INSULIN LISPRO 1 UNITS: 100 INJECTION, SOLUTION INTRAVENOUS; SUBCUTANEOUS at 12:54

## 2020-02-01 RX ADMIN — METOPROLOL SUCCINATE 25 MG: 25 TABLET, EXTENDED RELEASE ORAL at 09:53

## 2020-02-01 RX ADMIN — IPRATROPIUM BROMIDE AND ALBUTEROL SULFATE 1 AMPULE: 2.5; .5 SOLUTION RESPIRATORY (INHALATION) at 16:28

## 2020-02-01 RX ADMIN — SODIUM CHLORIDE 25 ML: 9 INJECTION, SOLUTION INTRAVENOUS at 10:01

## 2020-02-01 RX ADMIN — Medication 500 MG: at 21:17

## 2020-02-01 RX ADMIN — BUMETANIDE 1 MG: 0.25 INJECTION INTRAMUSCULAR; INTRAVENOUS at 04:12

## 2020-02-01 RX ADMIN — FOLIC ACID 1 MG: 1 TABLET ORAL at 09:53

## 2020-02-01 RX ADMIN — DOCUSATE SODIUM 100 MG: 100 CAPSULE, LIQUID FILLED ORAL at 09:52

## 2020-02-01 RX ADMIN — INSULIN LISPRO 1 UNITS: 100 INJECTION, SOLUTION INTRAVENOUS; SUBCUTANEOUS at 17:40

## 2020-02-01 RX ADMIN — IPRATROPIUM BROMIDE AND ALBUTEROL SULFATE 1 AMPULE: 2.5; .5 SOLUTION RESPIRATORY (INHALATION) at 08:23

## 2020-02-01 RX ADMIN — POTASSIUM CHLORIDE 40 MEQ: 1500 TABLET, EXTENDED RELEASE ORAL at 09:54

## 2020-02-01 RX ADMIN — PIPERACILLIN AND TAZOBACTAM 3.38 G: 3; .375 INJECTION, POWDER, LYOPHILIZED, FOR SOLUTION INTRAVENOUS at 20:03

## 2020-02-01 RX ADMIN — IPRATROPIUM BROMIDE AND ALBUTEROL SULFATE 1 AMPULE: 2.5; .5 SOLUTION RESPIRATORY (INHALATION) at 12:31

## 2020-02-01 RX ADMIN — FORMOTEROL FUMARATE DIHYDRATE 20 MCG: 20 SOLUTION RESPIRATORY (INHALATION) at 08:24

## 2020-02-01 RX ADMIN — FERROUS SULFATE TAB 325 MG (65 MG ELEMENTAL FE) 325 MG: 325 (65 FE) TAB at 17:42

## 2020-02-01 RX ADMIN — SODIUM CHLORIDE, PRESERVATIVE FREE 10 ML: 5 INJECTION INTRAVENOUS at 17:43

## 2020-02-01 RX ADMIN — PANTOPRAZOLE SODIUM 40 MG: 40 TABLET, DELAYED RELEASE ORAL at 09:53

## 2020-02-01 RX ADMIN — Medication 300 UNITS: at 21:17

## 2020-02-01 RX ADMIN — FORMOTEROL FUMARATE DIHYDRATE 20 MCG: 20 SOLUTION RESPIRATORY (INHALATION) at 21:11

## 2020-02-01 RX ADMIN — Medication 500 MG: at 09:54

## 2020-02-01 RX ADMIN — INSULIN LISPRO 1 UNITS: 100 INJECTION, SOLUTION INTRAVENOUS; SUBCUTANEOUS at 21:22

## 2020-02-01 RX ADMIN — ASPIRIN 81 MG: 81 TABLET, COATED ORAL at 09:54

## 2020-02-01 RX ADMIN — Medication 300 UNITS: at 04:12

## 2020-02-01 RX ADMIN — MAGNESIUM GLUCONATE 500 MG ORAL TABLET 400 MG: 500 TABLET ORAL at 09:53

## 2020-02-01 RX ADMIN — SODIUM CHLORIDE 25 ML: 9 INJECTION, SOLUTION INTRAVENOUS at 00:25

## 2020-02-01 RX ADMIN — LOSARTAN POTASSIUM 25 MG: 25 TABLET, FILM COATED ORAL at 09:53

## 2020-02-01 RX ADMIN — AMIODARONE HYDROCHLORIDE 400 MG: 200 TABLET ORAL at 09:52

## 2020-02-01 ASSESSMENT — PAIN DESCRIPTION - PAIN TYPE: TYPE: SURGICAL PAIN

## 2020-02-01 ASSESSMENT — PAIN SCALES - GENERAL
PAINLEVEL_OUTOF10: 0

## 2020-02-01 NOTE — PLAN OF CARE
Problem: Risk for Impaired Skin Integrity  Goal: Tissue integrity - skin and mucous membranes  Description  Structural intactness and normal physiological function of skin and  mucous membranes. Outcome: Met This Shift     Problem:  Activity:  Goal: Risk for activity intolerance will decrease  Description  Risk for activity intolerance will decrease  Outcome: Met This Shift  Goal: Will verbalize the importance of balancing activity with adequate rest periods  Description  Will verbalize the importance of balancing activity with adequate rest periods  Outcome: Met This Shift     Problem: Cardiac:  Goal: Ability to maintain an adequate cardiac output will improve  Description  Ability to maintain an adequate cardiac output will improve  Outcome: Met This Shift     Problem: Coping:  Goal: Ability to verbalize feelings about condition will improve  Description  Ability to verbalize feelings about condition will improve  Outcome: Met This Shift  Goal: Ability to identify strategies to decrease anxiety will improve  Description  Ability to identify strategies to decrease anxiety will improve  Outcome: Met This Shift     Problem: Health Behavior:  Goal: Ability to identify changes in lifestyle to reduce recurrence of condition will improve  Description  Ability to identify changes in lifestyle to reduce recurrence of condition will improve  Outcome: Met This Shift  Goal: Ability to manage health-related needs will improve  Description  Ability to manage health-related needs will improve  Outcome: Met This Shift     Problem: Nutritional:  Goal: Ability to identify appropriate dietary choices will improve  Description  Ability to identify appropriate dietary choices will improve  Outcome: Met This Shift     Problem: Respiratory:  Goal: Ability to maintain adequate ventilation will improve  Description  Ability to maintain adequate ventilation will improve  Outcome: Met This Shift     Problem: Pain:  Goal: Pain level will

## 2020-02-01 NOTE — PROGRESS NOTES
Daily    ferrous sulfate  325 mg Oral BID     vitamin C  500 mg Oral BID    folic acid  1 mg Oral Daily    pantoprazole  40 mg Oral Daily    insulin lispro  0-6 Units Subcutaneous TID     insulin lispro  0-3 Units Subcutaneous Nightly    sodium chloride  25 mL Intravenous Q8H    And    piperacillin-tazobactam  3.375 g Intravenous Q8H    losartan  25 mg Oral Daily    formoterol  20 mcg Nebulization Q12H    budesonide  500 mcg Nebulization BID    bumetanide  1 mg Intravenous Q12H    amiodarone  400 mg Oral BID    metoprolol succinate  25 mg Oral Daily    heparin flush  3 mL Intravenous 2 times per day    sodium chloride flush  10 mL Intravenous 2 times per day    magnesium oxide  400 mg Oral Daily    ipratropium-albuterol  1 ampule Inhalation Q4H WA    insulin glargine  0.15 Units/kg Subcutaneous Nightly    tamsulosin  0.4 mg Oral Daily    atorvastatin  80 mg Oral Nightly       Physical Exam:  General Appearance: appears comfortable in no acute distress. HEENT: Normocephalic atraumatic without obvious abnormality   Neck: Lips, mucosa, and tongue normal.  Supple, symmetrical, trachea midline, no adenopathy;thyroid:  no enlargement/tenderness/nodules or JVD. Lung: Breath sounds CTA, more aeration left today right clear. Respirations   unlabored. Symmetrical expansion. Heart: RRR, normal S1, S2. No MRG  Abdomen: Soft, NT, ND. BS present x 4 quadrants. No bruit or organomegaly. Extremities: Pedal pulses 2+ symmetric b/l. Extremities normal, no cyanosis, clubbing, or edema. Musculokeletal: No joint swelling, no muscle tenderness. ROM normal in all joints of extremities. Neurologic: Mental status: Alert and Oriented X3 .     Pertinent/ New Labs and Imaging Studies     Imaging Personally Reviewed:  Pending     ECHO  Effusion   No evidence for hemodynamically significant pericardial effusion.      Aorta   Normal aortic root and ascending aorta.      Conclusions      Summary   Limited study.      Ejection fraction is visually estimated at 45-50%.   Distal anteroseptal and apical walls are hypokinetic.   Septal motion consistent with post bypass surgery.   Normal right ventricle size with decreased function.   No evidence for hemodynamically significant pericardial effusion.      Signature       Labs:  Lab Results   Component Value Date    WBC 16.0 02/01/2020    HGB 10.1 02/01/2020    HCT 32.0 02/01/2020    MCV 88.6 02/01/2020    MCH 28.0 02/01/2020    MCHC 31.6 02/01/2020    RDW 14.6 02/01/2020     02/01/2020    MPV 11.5 02/01/2020     Lab Results   Component Value Date     02/01/2020    K 3.7 02/01/2020    K 4.4 01/24/2020    CL 99 02/01/2020    CO2 25 02/01/2020    BUN 24 02/01/2020    CREATININE 0.9 02/01/2020    LABALBU 3.5 01/24/2020    CALCIUM 8.3 02/01/2020    GFRAA >60 02/01/2020    LABGLOM >60 02/01/2020     Lab Results   Component Value Date    PROTIME 12.7 01/24/2020    INR 1.1 01/24/2020     No results for input(s): PROBNP in the last 72 hours. No results for input(s): PROCAL in the last 72 hours. This SmartLink has not been configured with any valid records. Micro:  Recent Labs     01/29/20  1435   CULTRESP Oral Pharyngeal Rose reduced*  Heavy growth     Recent Labs     01/30/20  1200   LABGRAM Gram stain performed on unspun fluid  Rare Polymorphonuclear leukocytes  Epithelial cells not seen  No organisms seen          Assessment:    1. Acute hypoxic respiratory failure  2. Bilateral pleural effusions with atelectasis, bibasilar infiltrates, CHF  3. Pneumonia, respiratory culture with heavy growth of Klebsiella  4. Status post thoracentesis left 850 cc bloody fluid  5. 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 Altered mental status-likely postoperative delirium  7. Leukocytosis  8. Obstipation  9. PAF heparin infusion will need [address]  10. Hx CAD   11. ICM with EF 20%         Plan:   1.  incentive spirometry and pep flutter  2.  Respiratory culture with heavy growth of Klebsiella  3. Continue Zosyn every 8 hours  4. Perforomist and Pulmicort  5. Bronchodilator every 4 hours while awake  6. Continue Bumex 1 mg every 12 hours , strict I&O  7. Keep pulse oximetry greater than 92%, currently on 3 L   8. Will need outpatient PFT  9. Await pleural fluid studies- no growth   10.  Transition back to oral anticoagulation when okay per surgery        Electronically signed by Jensen Freed DO on 2/1/2020 at 12:21 PM

## 2020-02-01 NOTE — PROGRESS NOTES
POD# 8  Awake, alert. No complaints. No further confusion   Vitals:    01/31/20 2335 02/01/20 0400 02/01/20 0502 02/01/20 0730   BP:  (!) 117/52     Pulse:  76     Resp:  16     Temp:  98.1 °F (36.7 °C)     TempSrc:  Temporal     SpO2: 91% 93%  (!) 89%   Weight:   183 lb 3.2 oz (83.1 kg)    Height:           Intake/Output Summary (Last 24 hours) at 2/1/2020 0906  Last data filed at 2/1/2020 0651  Gross per 24 hour   Intake 640 ml   Output 1450 ml   Net -810 ml     Recent Labs     02/01/20  0415   HGB 10.1*   HCT 32.0*   BUN 24*   CREATININE 0.9        PE  Cardiac: RRR  Lungs: decreased bases  Chest incision with intact GIGI DSD. Sternum stable. Prior chest tube site incisions C/D/I, no erythema with intact sutures. Epicardial pacing wires present and secure. Abd: Softly distended, nontender, +BS  Ext: Incisions C/D/I, approximated, no erythema, +minmal LE edema        A/P:   Stable s/p CABG x 2, POD#7  --Increase activity as tolerated  --Continue knee high JO ANN hose/pcds/progressive ambulation  --patient NSR in 70's will remove pacing wires. Epicardial pacing wires cut without difficulty.  Patient tolerated well       Acute blood loss anemia secondary to open heart surgery  --10.1 yesterday post thoracentesis, down to 8.5, transfused 1 UPRBC and hgb 10 this AM  --trend H/H   --continue to hold anticoagulants at this time     PAF (intermitent afib 1/25 - 1/28)  --now maintaining NSR--tolerating BB and oral amio  --heparin infusion on hold post right thoracentesis yesterday--continue to hold at this time with drop in hgb  --discussed with Dr. Cherry Gip this point risk of bleeding greater than benefit of anticoagulation--will monitor over the next couple of days for any further afib and discuss NOAC on discharge at that time        Prolonged postoperative respiratory insufficiency  -- wean oxygen to keep SpO2 greater than or equal to 92%  --continue duonebs with ezpap  --encourage C&DB/SMI  --continue empiric zosyn (day 5)  --diurese--continue bumex as per pulmonary   --pulmonary following      Bilateral pleural effusions  --s/p left thoracentesis 1/30/2020 for 850cc hemorrhagic fluid per pulmonary      ICM  --LV estimated at 20% preop, improved with IABP to 40-45%  --IABP removed POD1  --Epi off 1/26  --Tolerating toprol XL, diuresis  --Will need life vest prior to discharge per cardiology (ordered)    --repeat TTE to evaluate LV function --done 1/30/2020--EF 45-50%     Post operative delirium  --narcotics discontinued 1/29  --encourage wakefulness during the day and sleep at night  --improving     Constipation/abdominal distention   --resolved  --Continue MOM, colace, and senna   --encouraged continued increase in oral intake and activity.     Disposition  - Ambulating 400ft  -DC home in next 1-2 days once pulmonary issues improve and okay with all other consultants

## 2020-02-01 NOTE — PROGRESS NOTES
Hospitalist Progress Note      PCP: Yael Capellan DO    Date of Admission: 1/22/2020    Chief Complaint: medical management    Hospital Course:   Seen by cardiology. Had a left heart cath performed. 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 Cardiothoracic surgery consulted and performed CABG x2 (LIMA-LAD, SVG-PDA). With time, the intra-aortic balloon pump was removed. Patient placed in CVICU. With time patient was able to be transferred out. Pulmonology was consulted secondary to continued shortness of breath. Found to have bilateral pleural effusions. To possibly have thoracentesis on 1/30. Diuresed with Bumex. Became delirious with time. Narcotics discontinued and sitter placed at bedside. Delirium improved with time. Thoracentesis performed on 1/30 and had 850 cc of bloody/hemorrhagic fluid removed from his left chest.  1/31/20: anemia improving. BGL stable. Vital signs stable. Leukocytosis stable. Patient on zosyn q8h. Nebulizers, bumex 1 mg q12h. Off of heparin for now d/t bloody pleural fluid. Per cardio-continue toprol, ARB, bumex; switch to NOAC before discharge, patient does not need a Lifevest. Per pulm-will need outpatient PFT.  2/1/20: anemia stable. BGL stable less than 180 today-continue current treatment. Heparin continues to be on hold per pulm. Patient had BM yesterday. Subjective:   Patient lying in bed. He has no complaints at the present time.      Medications:  Reviewed    Infusion Medications    [Held by provider] heparin (porcine) 16 Units/kg/hr (01/30/20 0640)    dextrose       Scheduled Medications    senna  2 tablet Oral Nightly    docusate sodium  100 mg Oral BID    aspirin  81 mg Oral Daily    ferrous sulfate  325 mg Oral BID     vitamin C  500 mg Oral BID    folic acid  1 mg Oral Daily    pantoprazole  40 mg Oral Daily    insulin lispro  0-6 Units Subcutaneous TID     insulin lispro 0-3 Units Subcutaneous Nightly    sodium chloride  25 mL Intravenous Q8H    And    piperacillin-tazobactam  3.375 g Intravenous Q8H    losartan  25 mg Oral Daily    formoterol  20 mcg Nebulization Q12H    budesonide  500 mcg Nebulization BID    bumetanide  1 mg Intravenous Q12H    amiodarone  400 mg Oral BID    metoprolol succinate  25 mg Oral Daily    heparin flush  3 mL Intravenous 2 times per day    sodium chloride flush  10 mL Intravenous 2 times per day    magnesium oxide  400 mg Oral Daily    ipratropium-albuterol  1 ampule Inhalation Q4H WA    insulin glargine  0.15 Units/kg Subcutaneous Nightly    tamsulosin  0.4 mg Oral Daily    atorvastatin  80 mg Oral Nightly     PRN Meds: bisacodyl, acetaminophen, magnesium hydroxide, potassium chloride, ondansetron, sodium chloride flush, heparin flush, sodium chloride flush, glucose, dextrose, glucagon (rDNA), dextrose      Intake/Output Summary (Last 24 hours) at 2/1/2020 0855  Last data filed at 2/1/2020 0651  Gross per 24 hour   Intake 820 ml   Output 1450 ml   Net -630 ml       Exam:    BP (!) 117/52   Pulse 76   Temp 98.1 °F (36.7 °C) (Temporal)   Resp 16   Ht 5' 7\" (1.702 m)   Wt 183 lb 3.2 oz (83.1 kg)   SpO2 (!) 89% Comment: 90 before walk, 89-90 walking, 90 after walk  BMI 28.69 kg/m²     General appearance: No apparent distress, appears stated age and cooperative. HEENT: Pupils equal, round, and reactive to light. Conjunctivae/corneas clear. Poor dentition  Neck: Supple, with full range of motion. No jugular venous distention. Trachea midline. Respiratory:  Normal respiratory effort. Clear to auscultation, bilaterally without Rales/Wheezes/Rhonchi. Cardiovascular: Regular rate and rhythm with normal S1/S2 without murmurs, rubs or gallops. Abdomen: Soft, non-tender, non-distended with normal bowel sounds. Musculoskeletal: No clubbing, cyanosis or edema bilaterally. Full range of motion without deformity.   Skin: Skin color, texture, turgor normal.  Incision midsternum, dressing dry and intact  Neurologic:  Neurovascularly intact without any focal sensory/motor deficits. Psychiatric: Alert and oriented, thought content appropriate, normal insight  Capillary Refill: Brisk,< 3 seconds   Peripheral Pulses: +2 palpable, equal bilaterally       Labs:   Recent Labs     01/30/20  0510  01/31/20  0650 01/31/20  1800 02/01/20  0415   WBC 17.0*  --  16.7*  --  16.0*   HGB 9.9*   < > 10.0* 10.8* 10.1*   HCT 30.2*   < > 31.9* 33.7* 32.0*     --  339  --  358    < > = values in this interval not displayed. Recent Labs     01/30/20  0510 01/31/20  0650 02/01/20  0415    143 138   K 3.8 4.0 3.7    102 99   CO2 25 25 25   BUN 30* 26* 24*   CREATININE 0.9 1.0 0.9   CALCIUM 8.3* 8.5* 8.3*     No results for input(s): AST, ALT, BILIDIR, BILITOT, ALKPHOS in the last 72 hours. No results for input(s): INR in the last 72 hours. No results for input(s): Murleen Iba in the last 72 hours.     Assessment/Plan:    Active Hospital Problems    Diagnosis Date Noted    Ischemic cardiomyopathy [I25.5] 01/28/2020    Atrial fibrillation with RVR ([address] [I48.91] 01/28/2020    CAD in native artery [I25.10] 01/22/2020    Non-ST elevation MI (NSTEMI) ([facility]) [I21.4]     Chest pain [R07.9]      Plan  Hold heparin today  Nebulizer  Supplemental oxygen PRN  Daily weights  I/Os  Medications as ordered  Pain control-judicious narcotic use  Monitor bowel function  IS/C&DB while awake  OOB for meals  Monitor BGL  Insulin sliding scale  lantus  Monitor labs  Transfuse PRN    DVT Prophylaxis: SCDs  Diet: DIET CARDIAC;  Code Status: Full Code    PT/OT Eval Status: active and ongoing    Dispo - per primary    Shin Main CNP

## 2020-02-01 NOTE — PLAN OF CARE
Problem: Risk for Impaired Skin Integrity  Goal: Tissue integrity - skin and mucous membranes  Description  Structural intactness and normal physiological function of skin and  mucous membranes. 2/1/2020 1814 by Chelsey Jain RN  Outcome: Met This Shift  2/1/2020 1608 by Milton Martins RN  Outcome: Met This Shift     Problem:  Activity:  Goal: Risk for activity intolerance will decrease  Description  Risk for activity intolerance will decrease  2/1/2020 1814 by Chelsey Jain RN  Outcome: Met This Shift  2/1/2020 1608 by Milton Martins RN  Outcome: Met This Shift  Goal: Will verbalize the importance of balancing activity with adequate rest periods  Description  Will verbalize the importance of balancing activity with adequate rest periods  2/1/2020 1814 by Chelsey Jain RN  Outcome: Met This Shift  2/1/2020 1608 by Milton Martins RN  Outcome: Met This Shift     Problem: Cardiac:  Goal: Ability to maintain an adequate cardiac output will improve  Description  Ability to maintain an adequate cardiac output will improve  2/1/2020 1814 by Chelsey Jain RN  Outcome: Met This Shift  2/1/2020 1608 by Milton Martins RN  Outcome: Met This Shift     Problem: Coping:  Goal: Ability to verbalize feelings about condition will improve  Description  Ability to verbalize feelings about condition will improve  2/1/2020 1814 by Chelsey Jain RN  Outcome: Met This Shift  2/1/2020 1608 by Milton Martins RN  Outcome: Met This Shift  Goal: Ability to identify strategies to decrease anxiety will improve  Description  Ability to identify strategies to decrease anxiety will improve  2/1/2020 1814 by Chelsey Jain RN  Outcome: Met This Shift  2/1/2020 1608 by Milton Martins RN  Outcome: Met This Shift     Problem: Health Behavior:  Goal: Ability to identify changes in lifestyle to reduce recurrence of condition will improve  Description  Ability to identify changes in lifestyle to reduce recurrence of

## 2020-02-02 ENCOUNTER — APPOINTMENT (OUTPATIENT)
Dept: GENERAL RADIOLOGY | Age: 75
DRG: 233 | End: 2020-02-02
Attending: INTERNAL MEDICINE
Payer: MEDICARE

## 2020-02-02 LAB
ANION GAP SERPL CALCULATED.3IONS-SCNC: 14 MMOL/L (ref 7–16)
APTT: 29.7 SEC (ref 24.5–35.1)
BUN BLDV-MCNC: 15 MG/DL (ref 8–23)
CALCIUM SERPL-MCNC: 8.6 MG/DL (ref 8.6–10.2)
CHLORIDE BLD-SCNC: 103 MMOL/L (ref 98–107)
CO2: 23 MMOL/L (ref 22–29)
CREAT SERPL-MCNC: 0.8 MG/DL (ref 0.7–1.2)
GFR AFRICAN AMERICAN: >60
GFR NON-AFRICAN AMERICAN: >60 ML/MIN/1.73
GLUCOSE BLD-MCNC: 97 MG/DL (ref 74–99)
HCT VFR BLD CALC: 33.3 % (ref 37–54)
HEMOGLOBIN: 10.5 G/DL (ref 12.5–16.5)
MAGNESIUM: 2.1 MG/DL (ref 1.6–2.6)
MCH RBC QN AUTO: 28.1 PG (ref 26–35)
MCHC RBC AUTO-ENTMCNC: 31.5 % (ref 32–34.5)
MCV RBC AUTO: 89 FL (ref 80–99.9)
METER GLUCOSE: 118 MG/DL (ref 74–99)
METER GLUCOSE: 134 MG/DL (ref 74–99)
METER GLUCOSE: 336 MG/DL (ref 74–99)
METER GLUCOSE: 96 MG/DL (ref 74–99)
PDW BLD-RTO: 14.4 FL (ref 11.5–15)
PLATELET # BLD: 374 E9/L (ref 130–450)
PMV BLD AUTO: 10.9 FL (ref 7–12)
POTASSIUM SERPL-SCNC: 3.9 MMOL/L (ref 3.5–5)
RBC # BLD: 3.74 E12/L (ref 3.8–5.8)
SODIUM BLD-SCNC: 140 MMOL/L (ref 132–146)
WBC # BLD: 14.5 E9/L (ref 4.5–11.5)

## 2020-02-02 PROCEDURE — 2580000003 HC RX 258: Performed by: NURSE PRACTITIONER

## 2020-02-02 PROCEDURE — 83735 ASSAY OF MAGNESIUM: CPT

## 2020-02-02 PROCEDURE — 6370000000 HC RX 637 (ALT 250 FOR IP): Performed by: NURSE PRACTITIONER

## 2020-02-02 PROCEDURE — 2500000003 HC RX 250 WO HCPCS: Performed by: INTERNAL MEDICINE

## 2020-02-02 PROCEDURE — 6360000002 HC RX W HCPCS: Performed by: NURSE PRACTITIONER

## 2020-02-02 PROCEDURE — 94669 MECHANICAL CHEST WALL OSCILL: CPT

## 2020-02-02 PROCEDURE — 82962 GLUCOSE BLOOD TEST: CPT

## 2020-02-02 PROCEDURE — 6360000002 HC RX W HCPCS: Performed by: INTERNAL MEDICINE

## 2020-02-02 PROCEDURE — 6360000002 HC RX W HCPCS: Performed by: CLINICAL NURSE SPECIALIST

## 2020-02-02 PROCEDURE — 6370000000 HC RX 637 (ALT 250 FOR IP): Performed by: INTERNAL MEDICINE

## 2020-02-02 PROCEDURE — 93798 PHYS/QHP OP CAR RHAB W/ECG: CPT

## 2020-02-02 PROCEDURE — 94640 AIRWAY INHALATION TREATMENT: CPT

## 2020-02-02 PROCEDURE — 80048 BASIC METABOLIC PNL TOTAL CA: CPT

## 2020-02-02 PROCEDURE — 94660 CPAP INITIATION&MGMT: CPT

## 2020-02-02 PROCEDURE — 85027 COMPLETE CBC AUTOMATED: CPT

## 2020-02-02 PROCEDURE — 2140000000 HC CCU INTERMEDIATE R&B

## 2020-02-02 PROCEDURE — 71045 X-RAY EXAM CHEST 1 VIEW: CPT

## 2020-02-02 PROCEDURE — 85730 THROMBOPLASTIN TIME PARTIAL: CPT

## 2020-02-02 PROCEDURE — 2580000003 HC RX 258: Performed by: INTERNAL MEDICINE

## 2020-02-02 PROCEDURE — 36415 COLL VENOUS BLD VENIPUNCTURE: CPT

## 2020-02-02 PROCEDURE — 2700000000 HC OXYGEN THERAPY PER DAY

## 2020-02-02 RX ADMIN — Medication 500 MG: at 22:15

## 2020-02-02 RX ADMIN — INSULIN LISPRO 2 UNITS: 100 INJECTION, SOLUTION INTRAVENOUS; SUBCUTANEOUS at 22:20

## 2020-02-02 RX ADMIN — LOSARTAN POTASSIUM 25 MG: 25 TABLET, FILM COATED ORAL at 09:31

## 2020-02-02 RX ADMIN — METOPROLOL SUCCINATE 25 MG: 25 TABLET, EXTENDED RELEASE ORAL at 09:30

## 2020-02-02 RX ADMIN — FERROUS SULFATE TAB 325 MG (65 MG ELEMENTAL FE) 325 MG: 325 (65 FE) TAB at 17:23

## 2020-02-02 RX ADMIN — Medication 500 MG: at 09:31

## 2020-02-02 RX ADMIN — AMIODARONE HYDROCHLORIDE 400 MG: 200 TABLET ORAL at 09:30

## 2020-02-02 RX ADMIN — BUDESONIDE 500 MCG: 0.5 SUSPENSION RESPIRATORY (INHALATION) at 19:51

## 2020-02-02 RX ADMIN — FERROUS SULFATE TAB 325 MG (65 MG ELEMENTAL FE) 325 MG: 325 (65 FE) TAB at 09:31

## 2020-02-02 RX ADMIN — SODIUM CHLORIDE 25 ML: 9 INJECTION, SOLUTION INTRAVENOUS at 02:15

## 2020-02-02 RX ADMIN — FOLIC ACID 1 MG: 1 TABLET ORAL at 09:30

## 2020-02-02 RX ADMIN — SODIUM CHLORIDE, PRESERVATIVE FREE 10 ML: 5 INJECTION INTRAVENOUS at 09:29

## 2020-02-02 RX ADMIN — Medication 300 UNITS: at 22:16

## 2020-02-02 RX ADMIN — FORMOTEROL FUMARATE DIHYDRATE 20 MCG: 20 SOLUTION RESPIRATORY (INHALATION) at 19:51

## 2020-02-02 RX ADMIN — AMIODARONE HYDROCHLORIDE 400 MG: 200 TABLET ORAL at 22:15

## 2020-02-02 RX ADMIN — IPRATROPIUM BROMIDE AND ALBUTEROL SULFATE 1 AMPULE: 2.5; .5 SOLUTION RESPIRATORY (INHALATION) at 16:25

## 2020-02-02 RX ADMIN — IPRATROPIUM BROMIDE AND ALBUTEROL SULFATE 1 AMPULE: 2.5; .5 SOLUTION RESPIRATORY (INHALATION) at 11:30

## 2020-02-02 RX ADMIN — IPRATROPIUM BROMIDE AND ALBUTEROL SULFATE 1 AMPULE: 2.5; .5 SOLUTION RESPIRATORY (INHALATION) at 19:51

## 2020-02-02 RX ADMIN — ATORVASTATIN CALCIUM 80 MG: 40 TABLET, FILM COATED ORAL at 22:15

## 2020-02-02 RX ADMIN — PIPERACILLIN AND TAZOBACTAM 3.38 G: 3; .375 INJECTION, POWDER, LYOPHILIZED, FOR SOLUTION INTRAVENOUS at 04:29

## 2020-02-02 RX ADMIN — ASPIRIN 81 MG: 81 TABLET, COATED ORAL at 09:31

## 2020-02-02 RX ADMIN — INSULIN GLARGINE 12 UNITS: 100 INJECTION, SOLUTION SUBCUTANEOUS at 22:20

## 2020-02-02 RX ADMIN — SODIUM CHLORIDE, PRESERVATIVE FREE 10 ML: 5 INJECTION INTRAVENOUS at 22:15

## 2020-02-02 RX ADMIN — POTASSIUM CHLORIDE 20 MEQ: 1500 TABLET, EXTENDED RELEASE ORAL at 10:52

## 2020-02-02 RX ADMIN — SODIUM CHLORIDE 25 ML: 9 INJECTION, SOLUTION INTRAVENOUS at 10:04

## 2020-02-02 RX ADMIN — SODIUM CHLORIDE, PRESERVATIVE FREE 10 ML: 5 INJECTION INTRAVENOUS at 17:24

## 2020-02-02 RX ADMIN — DOCUSATE SODIUM 100 MG: 100 CAPSULE, LIQUID FILLED ORAL at 22:15

## 2020-02-02 RX ADMIN — FORMOTEROL FUMARATE DIHYDRATE 20 MCG: 20 SOLUTION RESPIRATORY (INHALATION) at 08:35

## 2020-02-02 RX ADMIN — TAMSULOSIN HYDROCHLORIDE 0.4 MG: 0.4 CAPSULE ORAL at 09:30

## 2020-02-02 RX ADMIN — BUMETANIDE 1 MG: 0.25 INJECTION INTRAMUSCULAR; INTRAVENOUS at 06:28

## 2020-02-02 RX ADMIN — PANTOPRAZOLE SODIUM 40 MG: 40 TABLET, DELAYED RELEASE ORAL at 12:04

## 2020-02-02 RX ADMIN — BUDESONIDE 500 MCG: 0.5 SUSPENSION RESPIRATORY (INHALATION) at 08:35

## 2020-02-02 RX ADMIN — BUMETANIDE 1 MG: 0.25 INJECTION INTRAMUSCULAR; INTRAVENOUS at 17:23

## 2020-02-02 RX ADMIN — CEFTRIAXONE 2 G: 2 INJECTION, POWDER, FOR SOLUTION INTRAMUSCULAR; INTRAVENOUS at 14:26

## 2020-02-02 RX ADMIN — Medication 300 UNITS: at 09:30

## 2020-02-02 RX ADMIN — SENNOSIDES 17.2 MG: 8.6 TABLET, FILM COATED ORAL at 22:15

## 2020-02-02 RX ADMIN — DOCUSATE SODIUM 100 MG: 100 CAPSULE, LIQUID FILLED ORAL at 09:30

## 2020-02-02 RX ADMIN — MAGNESIUM GLUCONATE 500 MG ORAL TABLET 400 MG: 500 TABLET ORAL at 09:31

## 2020-02-02 RX ADMIN — SODIUM CHLORIDE, PRESERVATIVE FREE 10 ML: 5 INJECTION INTRAVENOUS at 12:50

## 2020-02-02 RX ADMIN — IPRATROPIUM BROMIDE AND ALBUTEROL SULFATE 1 AMPULE: 2.5; .5 SOLUTION RESPIRATORY (INHALATION) at 08:35

## 2020-02-02 RX ADMIN — PIPERACILLIN AND TAZOBACTAM 3.38 G: 3; .375 INJECTION, POWDER, LYOPHILIZED, FOR SOLUTION INTRAVENOUS at 12:46

## 2020-02-02 ASSESSMENT — PAIN SCALES - GENERAL
PAINLEVEL_OUTOF10: 0

## 2020-02-02 NOTE — PROGRESS NOTES
non-distended with normal bowel sounds. Musculoskeletal: No clubbing, cyanosis or edema bilaterally. Full range of motion without deformity. Skin: Skin color, texture, turgor normal.  Incision midsternum, dressing dry and intact  Neurologic:  Neurovascularly intact without any focal sensory/motor deficits. Psychiatric: Alert and oriented, thought content appropriate, normal insight  Capillary Refill: Brisk,< 3 seconds   Peripheral Pulses: +2 palpable, equal bilaterally       Labs:   Recent Labs     01/31/20  0650 01/31/20  1800 02/01/20  0415 02/02/20  0535   WBC 16.7*  --  16.0* 14.5*   HGB 10.0* 10.8* 10.1* 10.5*   HCT 31.9* 33.7* 32.0* 33.3*     --  358 374     Recent Labs     01/31/20  0650 02/01/20  0415 02/02/20  0535    138 140   K 4.0 3.7 3.9    99 103   CO2 25 25 23   BUN 26* 24* 15   CREATININE 1.0 0.9 0.8   CALCIUM 8.5* 8.3* 8.6     No results for input(s): AST, ALT, BILIDIR, BILITOT, ALKPHOS in the last 72 hours. No results for input(s): INR in the last 72 hours. No results for input(s): Jazmyn Jailene in the last 72 hours.     Assessment/Plan:    Active Hospital Problems    Diagnosis Date Noted    Ischemic cardiomyopathy [I25.5] 01/28/2020    Atrial fibrillation with RVR ([facility] [address] [I48.91] 01/28/2020    CAD in native artery [I25.10] 01/22/2020    Non-ST elevation [facility] (NSTEMI) [facility]) [I21.4]     Chest pain [R07.9]      Plan    Nebulizer  Supplemental oxygen PRN  Daily weights  I/Os  Medications as ordered  Pain control-judicious narcotic use  Monitor bowel function  IS/C&DB while awake  OOB for meals  Monitor BGL  Insulin sliding scale  lantus  Monitor labs  Transfuse PRN    DVT Prophylaxis: SCDs  Diet: DIET CARDIAC;  Code Status: Full Code    PT/OT Eval Status: active and ongoing    Dispo - per primary    Carie Patterson CNP

## 2020-02-02 NOTE — PLAN OF CARE
Problem: Pain:  Goal: Pain level will decrease  Description  Pain level will decrease  2/2/2020 1839 by Yamini Ibrahim RN  Outcome: Met This Shift

## 2020-02-02 NOTE — PROGRESS NOTES
Date    PROTIME 12.7 01/24/2020    INR 1.1 01/24/2020     TSH:  No results found for: TSH    Rhythm Strip: NSR    ASSESSMENT & PLAN:    Patient Active Problem List   Diagnosis    CAD in native artery    Non-ST elevation [facility] (NSTEMI) [facility] [address]    Chest pain    Ischemic cardiomyopathy    Atrial fibrillation with RVR ([facility] [address]     1. CAD-CABG: status post two-vessel CABG with LIMA to LAD and SVG to PDA     Supportive care transitioning to guideline based therapy for his CAD and CMP. 2. CMP: Limited echo suggests LVEF 45-50%. Guideline medications as able. Continue toprol/ARB/lasix. Does not need Lifevest.    3. PAF: In sinus. CHADVASC score at least 5. Will need anticoagulation to prevent CVA. Amio. On IV heparin (ON HOLD), switch to NOAC before discharge when ok with Dolly Clark M.D.  [facility] [place]  Heart Failure and Pulmonary Hypertension  Mobile [phone]

## 2020-02-02 NOTE — PROGRESS NOTES
PEP/flutter therapy done with patient. Patient gave 10 breaths on device followed by 2 vargas coughs. Patient demonstrates great effort and tolerates therapy well.

## 2020-02-02 NOTE — PROGRESS NOTES
POD 9 Awake, alert. No complaints. Sitting OOB in chair    Vitals:    02/01/20 2010 02/02/20 0045 02/02/20 0445 02/02/20 0752   BP: (!) 130/53 110/62 (!) 143/58 (!) 140/58   Pulse: 70 68 69 74   Resp: 18 18 18 18   Temp: 98.4 °F (36.9 °C) 98.2 °F (36.8 °C) 98 °F (36.7 °C) 97.9 °F (36.6 °C)   TempSrc: Temporal Temporal Temporal Oral   SpO2: 94% 93% 95% 92%   Weight:   181 lb 9.6 oz (82.4 kg)    Height:           Intake/Output Summary (Last 24 hours) at 2/2/2020 0835  Last data filed at 2/2/2020 0445  Gross per 24 hour   Intake 315 ml   Output 500 ml   Net -185 ml     Recent Labs     02/02/20  0535   HGB 10.5*   HCT 33.3*   BUN 15   CREATININE 0.8        PE  Cardiac: RRR  Lungs: decreased bases  Chest incision . Sternum stable. Prior chest tube site incisions C/D/I, no erythema with intact sutures.   Abd: Softly distended, nontender, +BS  Ext: Incisions C/D/I, approximated, no erythema, +minmal LE edema        A/P:   Stable s/p CABG x 2, POD#7  --Increase activity as tolerated  --Continue knee high JO ANN hose/pcds/progressive ambulation       Acute blood loss anemia secondary to open heart surgery  --stable, hgb 10.5 today   --trend H/H   --continue to hold anticoagulants at this time     PAF (intermitent afib 1/25 - 1/28)  --now maintaining NSR--tolerating BB and oral amio  --heparin infusion on hold post right thoracentesis yesterday--continue to hold at this time with drop in hgb  --discussed with Dr. Rushie Brunner this point risk of bleeding greater than benefit of anticoagulation--will monitor over the next couple of days for any further afib and discuss NOAC on discharge at that time        Prolonged postoperative respiratory insufficiency  -- wean oxygen to keep SpO2 greater than or equal to 92%, today on 3L 02  --continue duonebs with ezpap  --encourage C&DB/SMI  --continue empiric zosyn (day 6)  --diurese--continue bumex as per pulmonary   --pulmonary following      Bilateral pleural effusions  --s/p left thoracentesis 1/30/2020 for 850cc hemorrhagic fluid per pulmonary      ICM  --LV estimated at 20% preop, improved with IABP to 40-45%  --IABP removed POD1  --Epi off 1/26  --Tolerating toprol XL, diuresis  --Will need life vest prior to discharge per cardiology (ordered)    --repeat TTE to evaluate LV function --done 1/30/2020--EF 45-50%     Post operative delirium  --narcotics discontinued 1/29  --encourage wakefulness during the day and sleep at night  --improving     Constipation/abdominal distention   --resolved  --Continue MOM, colace, and senna   --encouraged continued increase in oral intake and activity.      Disposition  - Ambulating 400ft  -DC home in next 1-2 days once pulmonary issues improve and okay with all other consultants

## 2020-02-02 NOTE — PROGRESS NOTES
King Wright M.D.,[facility]  Rocky Yang D.O., F.A.C.O.I., Leila Mcgee M.D. Magdi Milner M.D., Beverley Foster M.D. Rashad Cruz D.O. Daily Pulmonary Progress Note    Patient:  Ayad Mabry 67 y.o. male MRN: [id number]     Date of Service: 2/2/2020      Synopsis     We are following patient for acute respiratory failure status post CABG    \"CC\" shortness of breath    Code status: Full      Subjective      Patient was seen and examined. More fatigued today. Minimal cough. Review of Systems:  Constitutional: Denies fever, weight loss, night sweats, and fatigue  Skin: Denies pigmentation, dark lesions, and rashes   HEENT: Denies hearing loss, tinnitus, ear drainage, epistaxis, sore throat, and hoarseness. Cardiovascular: Denies palpitations, chest pain, and chest pressure. Respiratory: Denies cough,+ dyspnea at rest, hemoptysis, apnea, and choking.   Gastrointestinal: Denies nausea, vomiting, poor appetite, diarrhea, heartburn or reflux  Genitourinary: Denies dysuria, frequency, urgency or hematuria  Musculoskeletal: Denies myalgias, muscle weakness, and bone pain  24-hour events:  Left thoracentesis yesterday     Objective   Vitals: /60   Pulse 73   Temp 98.5 °F (36.9 °C)   Resp 16   Ht 5' 7\" (1.702 m)   Wt 181 lb 9.6 oz (82.4 kg)   SpO2 91% Comment: 95 before walk, 91-92 walking, 91 after walk  BMI 28.44 kg/m²     I/O:    Intake/Output Summary (Last 24 hours) at 2/2/2020 1252  Last data filed at 2/2/2020 1033  Gross per 24 hour   Intake 180 ml   Output 501 ml   Net -321 ml       Vent Information  $Ventilation: $Initial Day  Skin Assessment: Clean, dry, & intact  Vt Ordered: 540 mL  Rate Set: 0 bmp  Peak Flow: 57 L/min  Pressure Support: 8 cmH20  FiO2 : 50 %  Sensitivity: 3  PEEP/CPAP: 5  I Time/ I Time %: 0.8 s       IPAP: 15 cmH20  CPAP/EPAP: 5 cmH2O     CURRENT MEDS :  Scheduled Meds:   senna  2 tablet Oral Nightly    docusate sodium  100 mg Oral BID  aspirin  81 mg Oral Daily    ferrous sulfate  325 mg Oral BID     vitamin C  500 mg Oral BID    folic acid  1 mg Oral Daily    pantoprazole  40 mg Oral Daily    insulin lispro  0-6 Units Subcutaneous TID     insulin lispro  0-3 Units Subcutaneous Nightly    sodium chloride  25 mL Intravenous Q8H    And    piperacillin-tazobactam  3.375 g Intravenous Q8H    losartan  25 mg Oral Daily    formoterol  20 mcg Nebulization Q12H    budesonide  500 mcg Nebulization BID    bumetanide  1 mg Intravenous Q12H    amiodarone  400 mg Oral BID    metoprolol succinate  25 mg Oral Daily    heparin flush  3 mL Intravenous 2 times per day    sodium chloride flush  10 mL Intravenous 2 times per day    magnesium oxide  400 mg Oral Daily    ipratropium-albuterol  1 ampule Inhalation Q4H WA    insulin glargine  0.15 Units/kg Subcutaneous Nightly    tamsulosin  0.4 mg Oral Daily    atorvastatin  80 mg Oral Nightly       Physical Exam:  General Appearance: appears comfortable in no acute distress. HEENT: Normocephalic atraumatic without obvious abnormality   Neck: Lips, mucosa, and tongue normal.  Supple, symmetrical, trachea midline, no adenopathy;thyroid:  no enlargement/tenderness/nodules or JVD. Lung: Breath sounds CTA, more aeration left today right clear. Respirations   unlabored. Symmetrical expansion. Heart: RRR, normal S1, S2. No MRG  Abdomen: Soft, NT, ND. BS present x 4 quadrants. No bruit or organomegaly. Extremities: Pedal pulses 2+ symmetric b/l. Extremities normal, no cyanosis, clubbing, or edema. Musculokeletal: No joint swelling, no muscle tenderness. ROM normal in all joints of extremities. Neurologic: Mental status: Alert and Oriented X3 .     Pertinent/ New Labs and Imaging Studies     Imaging Personally Reviewed:  Pending     ECHO  Effusion   No evidence for hemodynamically significant pericardial effusion.      Aorta   Normal aortic root and ascending every 4 hours while awake  6. Continue Bumex 1 mg every 12 hours , strict I&O  7. Keep pulse oximetry greater than 92%, currently on 3 L   8. Will need outpatient PFT  9. Await pleural fluid studies- no growth   10. Transition back to oral anticoagulation when okay per surgery  11.  Repeat CXR         Electronically signed by Kiran Still DO on 2/2/2020 at 12:52 PM

## 2020-02-03 VITALS
RESPIRATION RATE: 20 BRPM | DIASTOLIC BLOOD PRESSURE: 38 MMHG | HEIGHT: 67 IN | SYSTOLIC BLOOD PRESSURE: 100 MMHG | TEMPERATURE: 98.5 F | WEIGHT: 178.5 LBS | HEART RATE: 68 BPM | BODY MASS INDEX: 28.02 KG/M2 | OXYGEN SATURATION: 95 %

## 2020-02-03 LAB
ANION GAP SERPL CALCULATED.3IONS-SCNC: 13 MMOL/L (ref 7–16)
BUN BLDV-MCNC: 12 MG/DL (ref 8–23)
CALCIUM SERPL-MCNC: 8.8 MG/DL (ref 8.6–10.2)
CHLORIDE BLD-SCNC: 101 MMOL/L (ref 98–107)
CO2: 24 MMOL/L (ref 22–29)
CREAT SERPL-MCNC: 0.8 MG/DL (ref 0.7–1.2)
GFR AFRICAN AMERICAN: >60
GFR NON-AFRICAN AMERICAN: >60 ML/MIN/1.73
GLUCOSE BLD-MCNC: 102 MG/DL (ref 74–99)
HCT VFR BLD CALC: 36.4 % (ref 37–54)
HEMOGLOBIN: 11.4 G/DL (ref 12.5–16.5)
MAGNESIUM: 2.4 MG/DL (ref 1.6–2.6)
MCH RBC QN AUTO: 27.7 PG (ref 26–35)
MCHC RBC AUTO-ENTMCNC: 31.3 % (ref 32–34.5)
MCV RBC AUTO: 88.6 FL (ref 80–99.9)
METER GLUCOSE: 118 MG/DL (ref 74–99)
METER GLUCOSE: 141 MG/DL (ref 74–99)
METER GLUCOSE: 164 MG/DL (ref 74–99)
PDW BLD-RTO: 14.1 FL (ref 11.5–15)
PLATELET # BLD: 479 E9/L (ref 130–450)
PMV BLD AUTO: 10.8 FL (ref 7–12)
POTASSIUM SERPL-SCNC: 4.2 MMOL/L (ref 3.5–5)
RBC # BLD: 4.11 E12/L (ref 3.8–5.8)
SODIUM BLD-SCNC: 138 MMOL/L (ref 132–146)
WBC # BLD: 16.5 E9/L (ref 4.5–11.5)

## 2020-02-03 PROCEDURE — 2580000003 HC RX 258: Performed by: NURSE PRACTITIONER

## 2020-02-03 PROCEDURE — 2700000000 HC OXYGEN THERAPY PER DAY

## 2020-02-03 PROCEDURE — 94660 CPAP INITIATION&MGMT: CPT

## 2020-02-03 PROCEDURE — 80048 BASIC METABOLIC PNL TOTAL CA: CPT

## 2020-02-03 PROCEDURE — 36415 COLL VENOUS BLD VENIPUNCTURE: CPT

## 2020-02-03 PROCEDURE — 6370000000 HC RX 637 (ALT 250 FOR IP): Performed by: NURSE PRACTITIONER

## 2020-02-03 PROCEDURE — 6360000002 HC RX W HCPCS: Performed by: CLINICAL NURSE SPECIALIST

## 2020-02-03 PROCEDURE — 83735 ASSAY OF MAGNESIUM: CPT

## 2020-02-03 PROCEDURE — 94667 MNPJ CHEST WALL 1ST: CPT

## 2020-02-03 PROCEDURE — 85027 COMPLETE CBC AUTOMATED: CPT

## 2020-02-03 PROCEDURE — 2580000003 HC RX 258: Performed by: INTERNAL MEDICINE

## 2020-02-03 PROCEDURE — 82962 GLUCOSE BLOOD TEST: CPT

## 2020-02-03 PROCEDURE — 6360000002 HC RX W HCPCS: Performed by: INTERNAL MEDICINE

## 2020-02-03 PROCEDURE — 94640 AIRWAY INHALATION TREATMENT: CPT

## 2020-02-03 PROCEDURE — 2500000003 HC RX 250 WO HCPCS: Performed by: INTERNAL MEDICINE

## 2020-02-03 PROCEDURE — 6370000000 HC RX 637 (ALT 250 FOR IP): Performed by: INTERNAL MEDICINE

## 2020-02-03 PROCEDURE — 93798 PHYS/QHP OP CAR RHAB W/ECG: CPT

## 2020-02-03 PROCEDURE — 6360000002 HC RX W HCPCS: Performed by: NURSE PRACTITIONER

## 2020-02-03 RX ORDER — ASCORBIC ACID 500 MG
500 TABLET ORAL 2 TIMES DAILY
Qty: 60 TABLET | Refills: 0 | Status: SHIPPED | OUTPATIENT
Start: 2020-02-03 | End: 2020-04-23

## 2020-02-03 RX ORDER — CEFDINIR 300 MG/1
300 CAPSULE ORAL 2 TIMES DAILY
Qty: 10 CAPSULE | Refills: 0 | Status: SHIPPED | OUTPATIENT
Start: 2020-02-03 | End: 2020-02-08

## 2020-02-03 RX ORDER — LOSARTAN POTASSIUM 25 MG/1
25 TABLET ORAL DAILY
Qty: 30 TABLET | Refills: 3 | Status: SHIPPED | OUTPATIENT
Start: 2020-02-03 | End: 2020-11-25

## 2020-02-03 RX ORDER — TAMSULOSIN HYDROCHLORIDE 0.4 MG/1
0.4 CAPSULE ORAL DAILY
Qty: 30 CAPSULE | Refills: 0 | Status: SHIPPED | OUTPATIENT
Start: 2020-02-03 | End: 2020-11-25

## 2020-02-03 RX ORDER — ATORVASTATIN CALCIUM 80 MG/1
80 TABLET, FILM COATED ORAL NIGHTLY
Qty: 30 TABLET | Refills: 3 | Status: SHIPPED | OUTPATIENT
Start: 2020-02-03

## 2020-02-03 RX ORDER — AMIODARONE HYDROCHLORIDE 200 MG/1
200 TABLET ORAL SEE ADMIN INSTRUCTIONS
Qty: 28 TABLET | Refills: 0 | Status: SHIPPED | OUTPATIENT
Start: 2020-02-03 | End: 2020-04-23

## 2020-02-03 RX ORDER — FERROUS SULFATE 325(65) MG
325 TABLET ORAL 2 TIMES DAILY WITH MEALS
Qty: 60 TABLET | Refills: 0 | Status: SHIPPED | OUTPATIENT
Start: 2020-02-03 | End: 2020-04-23

## 2020-02-03 RX ORDER — METOPROLOL SUCCINATE 25 MG/1
25 TABLET, EXTENDED RELEASE ORAL DAILY
Qty: 30 TABLET | Refills: 3 | Status: SHIPPED | OUTPATIENT
Start: 2020-02-03 | End: 2020-11-25

## 2020-02-03 RX ORDER — FOLIC ACID 1 MG/1
1 TABLET ORAL DAILY
Qty: 30 TABLET | Refills: 0 | Status: SHIPPED | OUTPATIENT
Start: 2020-02-03 | End: 2020-04-23 | Stop reason: ALTCHOICE

## 2020-02-03 RX ORDER — PANTOPRAZOLE SODIUM 40 MG/1
40 TABLET, DELAYED RELEASE ORAL DAILY
Qty: 14 TABLET | Refills: 0 | Status: SHIPPED | OUTPATIENT
Start: 2020-02-03 | End: 2020-04-23 | Stop reason: ALTCHOICE

## 2020-02-03 RX ORDER — ASPIRIN 81 MG/1
81 TABLET ORAL DAILY
Qty: 30 TABLET | Refills: 3 | Status: SHIPPED | OUTPATIENT
Start: 2020-02-03

## 2020-02-03 RX ORDER — ALBUTEROL SULFATE 90 UG/1
2 AEROSOL, METERED RESPIRATORY (INHALATION) 4 TIMES DAILY PRN
Qty: 3 INHALER | Refills: 1 | Status: SHIPPED | OUTPATIENT
Start: 2020-02-03 | End: 2020-04-23

## 2020-02-03 RX ORDER — POTASSIUM CHLORIDE 750 MG/1
10 TABLET, EXTENDED RELEASE ORAL DAILY
Qty: 7 TABLET | Refills: 0 | Status: SHIPPED | OUTPATIENT
Start: 2020-02-03 | End: 2020-04-23 | Stop reason: ALTCHOICE

## 2020-02-03 RX ORDER — BUMETANIDE 1 MG/1
1 TABLET ORAL DAILY
Qty: 7 TABLET | Refills: 0 | Status: SHIPPED | OUTPATIENT
Start: 2020-02-03 | End: 2020-04-23 | Stop reason: ALTCHOICE

## 2020-02-03 RX ADMIN — Medication 300 UNITS: at 09:52

## 2020-02-03 RX ADMIN — MAGNESIUM GLUCONATE 500 MG ORAL TABLET 400 MG: 500 TABLET ORAL at 09:51

## 2020-02-03 RX ADMIN — FOLIC ACID 1 MG: 1 TABLET ORAL at 09:51

## 2020-02-03 RX ADMIN — ASPIRIN 81 MG: 81 TABLET, COATED ORAL at 09:51

## 2020-02-03 RX ADMIN — Medication 300 UNITS: at 04:27

## 2020-02-03 RX ADMIN — Medication 500 MG: at 09:51

## 2020-02-03 RX ADMIN — IPRATROPIUM BROMIDE AND ALBUTEROL SULFATE 1 AMPULE: 2.5; .5 SOLUTION RESPIRATORY (INHALATION) at 12:58

## 2020-02-03 RX ADMIN — SODIUM CHLORIDE, PRESERVATIVE FREE 10 ML: 5 INJECTION INTRAVENOUS at 04:27

## 2020-02-03 RX ADMIN — AMIODARONE HYDROCHLORIDE 400 MG: 200 TABLET ORAL at 09:51

## 2020-02-03 RX ADMIN — FERROUS SULFATE TAB 325 MG (65 MG ELEMENTAL FE) 325 MG: 325 (65 FE) TAB at 09:51

## 2020-02-03 RX ADMIN — TAMSULOSIN HYDROCHLORIDE 0.4 MG: 0.4 CAPSULE ORAL at 09:51

## 2020-02-03 RX ADMIN — DOCUSATE SODIUM 100 MG: 100 CAPSULE, LIQUID FILLED ORAL at 09:51

## 2020-02-03 RX ADMIN — CEFTRIAXONE 2 G: 2 INJECTION, POWDER, FOR SOLUTION INTRAMUSCULAR; INTRAVENOUS at 15:00

## 2020-02-03 RX ADMIN — LOSARTAN POTASSIUM 25 MG: 25 TABLET, FILM COATED ORAL at 09:51

## 2020-02-03 RX ADMIN — SODIUM CHLORIDE, PRESERVATIVE FREE 10 ML: 5 INJECTION INTRAVENOUS at 06:05

## 2020-02-03 RX ADMIN — BUMETANIDE 1 MG: 0.25 INJECTION INTRAMUSCULAR; INTRAVENOUS at 06:05

## 2020-02-03 RX ADMIN — PANTOPRAZOLE SODIUM 40 MG: 40 TABLET, DELAYED RELEASE ORAL at 09:51

## 2020-02-03 RX ADMIN — BUDESONIDE 500 MCG: 0.5 SUSPENSION RESPIRATORY (INHALATION) at 12:57

## 2020-02-03 RX ADMIN — SODIUM CHLORIDE, PRESERVATIVE FREE 10 ML: 5 INJECTION INTRAVENOUS at 09:53

## 2020-02-03 RX ADMIN — FORMOTEROL FUMARATE DIHYDRATE 20 MCG: 20 SOLUTION RESPIRATORY (INHALATION) at 12:57

## 2020-02-03 RX ADMIN — INSULIN LISPRO 1 UNITS: 100 INJECTION, SOLUTION INTRAVENOUS; SUBCUTANEOUS at 11:42

## 2020-02-03 RX ADMIN — METOPROLOL SUCCINATE 25 MG: 25 TABLET, EXTENDED RELEASE ORAL at 09:52

## 2020-02-03 ASSESSMENT — PAIN SCALES - GENERAL
PAINLEVEL_OUTOF10: 0
PAINLEVEL_OUTOF10: 0

## 2020-02-03 NOTE — PROGRESS NOTES
Leo Evans M.D.,[facility]  Kathy Blanco D.O., F.A.C.O.I., Jose Angel Magaña M.D. Tameka Ellsworth M.D., Tapan Del Rosario M.D. Paramjit Marcano D.O. Daily Pulmonary Progress Note    Patient:  Alison Crane 67 y.o. male MRN: [id number]     Date of Service: 2/3/2020      Synopsis     We are following patient for acute respiratory failure status post CABG    \"CC\" shortness of breath    Code status: Full      Subjective      Patient was seen and examined. Doing well with breathing, oxygen weaned off. Occasional cough , not expectorating much. Feeling ok today. Tolerated left thoracentesis from 1/30/2020. Fluid exudate, , 850 cc removed. Review of Systems:  Constitutional: Denies fever, weight loss, night sweats, and fatigue  Skin: Denies pigmentation, dark lesions, and rashes   HEENT: Denies hearing loss, tinnitus, ear drainage, epistaxis, sore throat, and hoarseness. Cardiovascular: Denies palpitations, chest pain, and chest pressure. Respiratory: Denies cough,+ dyspnea at rest, hemoptysis, apnea, and choking.   Gastrointestinal: Denies nausea, vomiting, poor appetite, diarrhea, heartburn or reflux  Genitourinary: Denies dysuria, frequency, urgency or hematuria  Musculoskeletal: Denies myalgias, muscle weakness, and bone pain    24-hour events:  None     Objective   Vitals: BP (!) 98/50   Pulse 70   Temp 98.1 °F (36.7 °C)   Resp 27   Ht 5' 7\" (1.702 m)   Wt 178 lb 8 oz (81 kg)   SpO2 93%   BMI 27.96 kg/m²     I/O:    Intake/Output Summary (Last 24 hours) at 2/3/2020 1202  Last data filed at 2/3/2020 0844  Gross per 24 hour   Intake 1020 ml   Output 2000 ml   Net -980 ml       Vent Information  $Ventilation: $Initial Day  Skin Assessment: Clean, dry, & intact  Vt Ordered: 540 mL  Rate Set: 0 bmp  Peak Flow: 57 L/min  Pressure Support: 8 cmH20  FiO2 : 50 %  Sensitivity: 3  PEEP/CPAP: 5  I Time/ I Time %: 0.8 s       IPAP: 15 cmH20  CPAP/EPAP: 5 cmH2O     CURRENT MEDS :  Scheduled Meds:   cefTRIAXone (ROCEPHIN) IV  2 g Intravenous Q24H    senna  2 tablet Oral Nightly    docusate sodium  100 mg Oral BID    aspirin  81 mg Oral Daily    ferrous sulfate  325 mg Oral BID     vitamin C  500 mg Oral BID    folic acid  1 mg Oral Daily    pantoprazole  40 mg Oral Daily    insulin lispro  0-6 Units Subcutaneous TID     insulin lispro  0-3 Units Subcutaneous Nightly    losartan  25 mg Oral Daily    formoterol  20 mcg Nebulization Q12H    budesonide  500 mcg Nebulization BID    bumetanide  1 mg Intravenous Q12H    amiodarone  400 mg Oral BID    metoprolol succinate  25 mg Oral Daily    heparin flush  3 mL Intravenous 2 times per day    sodium chloride flush  10 mL Intravenous 2 times per day    magnesium oxide  400 mg Oral Daily    ipratropium-albuterol  1 ampule Inhalation Q4H WA    insulin glargine  0.15 Units/kg Subcutaneous Nightly    tamsulosin  0.4 mg Oral Daily    atorvastatin  80 mg Oral Nightly       Physical Exam:  General Appearance: appears comfortable in no acute distress. HEENT: Normocephalic atraumatic without obvious abnormality   Neck: Lips, mucosa, and tongue normal.  Supple, symmetrical, trachea midline, no adenopathy;thyroid:  no enlargement/tenderness/nodules or JVD. Lung: Breath sounds CTA, more aeration left today right clear. Respirations   unlabored. Symmetrical expansion. Heart: RRR, normal S1, S2. No MRG  Abdomen: Soft, NT, ND. BS present x 4 quadrants. No bruit or organomegaly. Extremities: Pedal pulses 2+ symmetric b/l. Extremities normal, no cyanosis, clubbing, or edema. Musculokeletal: No joint swelling, no muscle tenderness. ROM normal in all joints of extremities. Neurologic: Mental status: Alert and Oriented X3 .     Pertinent/ New Labs and Imaging Studies     Imaging Personally Reviewed:  Pending     ECHO  Effusion   No evidence for hemodynamically significant pericardial effusion.      Aorta   Normal aortic root and

## 2020-02-03 NOTE — PLAN OF CARE
Problem: Coping:  Goal: Ability to verbalize feelings about condition will improve  Description  Ability to verbalize feelings about condition will improve  Outcome: Met This Shift

## 2020-02-03 NOTE — CARE COORDINATION
SOCIAL WORK/CASEMANAGEMENT TRANSITION OF CARE PLANNING: pt ambulated 400' today with cardiac rehab. Plan is home with [facility]. Notified jeff vidales, to have life vest fitted today. Per ctvs pt may go home pending pulmonary. Pt is on 2l nc still. Cozetta Factor  2/3/2020    Life vest per ctvs is no longer needed. Cozetta Factor  2/3/2020    Notified [facility] of discharge. Per rn, pt on room air at 89% Cozetta Factor  . 2/3/2020

## 2020-02-05 ENCOUNTER — TELEPHONE (OUTPATIENT)
Dept: CARDIOLOGY CLINIC | Age: 75
End: 2020-02-05

## 2020-02-11 NOTE — DISCHARGE SUMMARY
Physician Discharge Summary     Patient ID:  Sarmad Yang  [id number]  55 y.o.  1945    Admit date: 1/22/2020    Discharge date and time: 2/3/2020  6:30 PM     Admitting Physician: Mick Gunter DO     Discharge Physician: Mick Gunter DO     Admission Diagnoses: CAD in native artery [I25.10]    Discharge Diagnoses:  Multivessel coronary artery disease and mild  mitral valve regurgitation and ischemic cardiomyopathy    PROCEDURES PERFORMED:  1. Coronary artery bypass grafting x2 using the left internal mammary  artery to the left anterior descending artery and reverse saphenous vein  graft to the posterior descending artery. 2.  Left lower extremity endoscopic vein harvest      Discharged Condition: stable    Indication for Admission: Patient is a 67 y.o. male without significant past medical history, [facility] complaining [facility] pain, patient was found to have NSTEMI as well as ongoing chest pain. He underwent urgent LHC yesterday which revealed severe MVCAD. Given his continued chest pain, an IABP was inserted. He is currently chest pain free.          Hospital Course: course as above and on 1/24/2020 patient underwent  Coronary artery bypass grafting x2 using the left internal mammary artery to the left anterior descending artery and reverse saphenous vein graft to the posterior descending artery. Left lower extremity endoscopic vein harvest.  Patient was transferred to [address] [place] [address] in stable condition and extubated that same day. On POD 1 she was weaning down on epi, IABP was removed and cont in CV[facility] . On POD 2 she was in afib and amio gtt was started, she cont to wean down on epi to off and CTs were DC'd, she again cont in [address] [place] [address]. On POD 3 PICC was ordered, she was started on BB, she remained in afib, and she cont to remain in CVIC due to HF o2 requirements. On POD 4 he was down to 6L and transferred to step down floor. He was placed on heparin infusion and was in NSR.   On POD 5 he was more confused, he was still in NSR, and on heparin infusion, he cont on zosyn , and was tolerating BB. Narcotics was DC'd . On POD 6 he underwent bilateral thorcentesis , On POD 7 -9 he cont to wean on o2, and confusion improved. Epicardial pacing wires were removed   On POD 10 patient was weaned off o2, he was DC'd on PO abx he was hemodynamically stable maintained NSR and was DC'd home     Consults: pulm       Discharge Exam:  PE  Cardiac: RRR  Lungs: decreased bases  Chest incision . Sternum stable. Prior chest tube site incisions C/D/I, no erythema with intact sutures. Abd: Softly distended, nontender, +BS  Ext: Incisions C/D/I, approximated, no erythema, +minmal LE edema       Disposition: home    Patient Instructions: Activity: sternal precautions   Diet: cardiac diet  Wound Care: as directed    No current facility-administered medications for this encounter.      Current Outpatient Medications:     aspirin 81 MG EC tablet, Take 1 tablet by mouth daily, Disp: 30 tablet, Rfl: 3    amiodarone (CORDARONE) 200 MG tablet, Take 1 tablet by mouth See Admin Instructions Take two tablets one time daily x 1 week, then take one tablet one time daily x 2 weeks and then discontinue, Disp: 28 tablet, Rfl: 0    atorvastatin (LIPITOR) 80 MG tablet, Take 1 tablet by mouth nightly, Disp: 30 tablet, Rfl: 3    losartan (COZAAR) 25 MG tablet, Take 1 tablet by mouth daily, Disp: 30 tablet, Rfl: 3    metoprolol succinate (TOPROL XL) 25 MG extended release tablet, Take 1 tablet by mouth daily, Disp: 30 tablet, Rfl: 3    ferrous sulfate 325 (65 Fe) MG tablet, Take 1 tablet by mouth 2 times daily (with meals), Disp: 60 tablet, Rfl: 0    folic acid (FOLVITE) 1 MG tablet, Take 1 tablet by mouth daily, Disp: 30 tablet, Rfl: 0    tamsulosin (FLOMAX) 0.4 MG capsule, Take 1 capsule by mouth daily, Disp: 30 capsule, Rfl: 0    pantoprazole (PROTONIX) 40 MG tablet, Take 1 tablet by mouth daily for 14 days, Disp: 14 tablet, Rfl: 0    vitamin C (VITAMIN C) 500 MG tablet, Take 1 tablet by mouth 2 times daily, Disp: 60 tablet, Rfl: 0    albuterol sulfate  (90 Base) MCG/ACT inhaler, Inhale 2 puffs into the lungs 4 times daily as needed for Wheezing, Disp: 3 Inhaler, Rfl: 1    bumetanide (BUMEX) 1 MG tablet, Take 1 tablet by mouth daily for 7 days, Disp: 7 tablet, Rfl: 0    potassium chloride (KLOR-CON M) 10 MEQ extended release tablet, Take 1 tablet by mouth daily for 7 days, Disp: 7 tablet, Rfl: 0    Lift Chair MISC, by Does not apply route, Disp: 1 each, Rfl: 0    vitamin B-12 (CYANOCOBALAMIN) 500 MCG tablet, Take 500 mcg by mouth daily, Disp: , Rfl:     Cholecalciferol (VITAMIN D) 50 MCG (2000 UT) CAPS capsule, Take by mouth, Disp: , Rfl:     Smoking cessation education given to patient prior to DC    Patient educated on cardiac rehab program after DC     Signed:  Erna Hernandez  2/11/2020  10:15 AM

## 2020-02-12 ENCOUNTER — TELEPHONE (OUTPATIENT)
Dept: CARDIOTHORACIC SURGERY | Age: 75
End: 2020-02-12

## 2020-02-13 NOTE — TELEPHONE ENCOUNTER
Spoke to pts wife she said there is no way they can come to hospital today. She doesn't drive and the weather is bad. States he is coughing today and seems to be bringing up phlegm. He had a dry cough yesterday. He feels better then yesterday.  Please advise

## 2020-02-19 NOTE — TELEPHONE ENCOUNTER
I spoke to Aren on Monday she stated it was ordered but not yet performed. She said they will fax over results when complete.

## 2020-02-21 ENCOUNTER — HOSPITAL ENCOUNTER (OUTPATIENT)
Dept: CARDIAC REHAB | Age: 75
Setting detail: THERAPIES SERIES
Discharge: HOME | End: 2020-02-21

## 2020-02-24 ENCOUNTER — HOSPITAL ENCOUNTER (OUTPATIENT)
Dept: CARDIAC REHAB | Age: 75
Setting detail: THERAPIES SERIES
Discharge: HOME | End: 2020-02-24

## 2020-02-25 ENCOUNTER — OFFICE VISIT (OUTPATIENT)
Dept: CARDIOTHORACIC SURGERY | Age: 75
End: 2020-02-25

## 2020-02-25 VITALS
BODY MASS INDEX: 26.06 KG/M2 | SYSTOLIC BLOOD PRESSURE: 115 MMHG | RESPIRATION RATE: 16 BRPM | HEIGHT: 67 IN | DIASTOLIC BLOOD PRESSURE: 54 MMHG | WEIGHT: 166 LBS | HEART RATE: 58 BPM

## 2020-02-25 PROCEDURE — 99024 POSTOP FOLLOW-UP VISIT: CPT | Performed by: NURSE PRACTITIONER

## 2020-02-27 ENCOUNTER — TELEPHONE (OUTPATIENT)
Dept: CARDIOLOGY CLINIC | Age: 75
End: 2020-02-27

## 2020-02-28 ENCOUNTER — HOSPITAL ENCOUNTER (OUTPATIENT)
Dept: CARDIAC REHAB | Age: 75
Setting detail: THERAPIES SERIES
Discharge: HOME | End: 2020-02-28

## 2020-03-02 ENCOUNTER — HOSPITAL ENCOUNTER (OUTPATIENT)
Dept: CARDIAC REHAB | Age: 75
Setting detail: THERAPIES SERIES
Discharge: HOME | End: 2020-03-02

## 2020-03-03 ENCOUNTER — HOSPITAL ENCOUNTER (OUTPATIENT)
Dept: CARDIAC REHAB | Age: 75
Setting detail: THERAPIES SERIES
Discharge: HOME | End: 2020-03-03

## 2020-03-05 ENCOUNTER — HOSPITAL ENCOUNTER (OUTPATIENT)
Dept: CARDIAC REHAB | Age: 75
Setting detail: THERAPIES SERIES
Discharge: HOME | End: 2020-03-05

## 2020-04-23 ENCOUNTER — VIRTUAL VISIT (OUTPATIENT)
Dept: CARDIOLOGY CLINIC | Age: 75
End: 2020-04-23
Payer: MEDICARE

## 2020-04-23 VITALS — HEIGHT: 67 IN | WEIGHT: 164 LBS | BODY MASS INDEX: 25.74 KG/M2

## 2020-04-23 PROCEDURE — 99442 PR PHYS/QHP TELEPHONE EVALUATION 11-20 MIN: CPT | Performed by: INTERNAL MEDICINE

## 2020-04-23 NOTE — PROGRESS NOTES
4/23/2020. Consent:  He and/or health care decision maker is aware that that he may receive a bill for this telephone service, depending on his insurance coverage, and has provided verbal consent to proceed: Yes    Documentation:  I communicated with the patient and/or health care decision maker about there cardiovascular issues, see above documentation. I affirm this is a Patient Initiated Episode with an Established Patient who has not had a related appointment within my department in the past 7 days or scheduled within the next 24 hours.     Total Time: minutes: 11-20 minutes    Note: not billable if this call serves to triage the patient into an appointment for the relevant concern      Yolande Bermudez

## 2020-09-23 NOTE — ANESTHESIA PRE PROCEDURE
 calcium carbonate (TUMS) chewable tablet 500 mg  500 mg Oral TID PRN Marcus Speak, DO        pantoprazole (PROTONIX) tablet 40 mg  40 mg Oral QAM AC Marcus Speak, DO   40 mg at 01/23/20 0645    aspirin EC tablet 81 mg  81 mg Oral Daily Raina Colin MD   81 mg at 01/23/20 0848    atorvastatin (LIPITOR) tablet 80 mg  80 mg Oral Nightly Raina Colin MD   80 mg at 01/23/20 2118    carvedilol (COREG) tablet 6.25 mg  6.25 mg Oral BID WC Raina Colin MD   6.25 mg at 01/23/20 1721    spironolactone (ALDACTONE) tablet 25 mg  25 mg Oral Daily Raina Colin MD   25 mg at 01/23/20 0848       Allergies: Allergies   Allergen Reactions    Tetanus Toxoids      Flu like symptoms       Problem List:    Patient Active Problem List   Diagnosis Code    CAD in native artery I25.10    [facility] I[facility].4    Chest pain R07.9       Past Medical History:        Diagnosis Date    CAD in native artery 1/22/2020    Hernia, umbilical     [facility]        Past Surgical History:        Procedure Laterality Date    HERNIA REPAIR         Social History:    Social History     Tobacco Use    Smoking status: Former Smoker     Last attempt to quit: 1/1/2005     Years since quitting: 15.0    Smokeless tobacco: Current User     Types: Chew   Substance Use Topics    Alcohol use: Not on file                                Ready to quit: Not Answered  Counseling given: Not Answered      Vital Signs (Current):   Vitals:    01/24/20 0200 01/24/20 0300 01/24/20 0400 01/24/20 0500   BP: 137/62 126/70 (!) 129/53 91/73   Pulse: 80 85 86 84   Resp: 17 20 15 17   Temp:   97.8 °F (36.6 °C)    TempSrc:   Temporal    SpO2: 97% 95% 93% 94%   Weight:       Height:                                                  BP Readings from Last 3 Encounters:   01/24/20 91/73   01/23/20 120/69     CT Chest 1/23/20  Impression       1.  Small bilateral pleural effusions and bibasilar atelectasis right [General Appearance - Well Developed] : well developed more than left. 2. Aorta and pulmonary arteries have normal size. Mild CAD. 3. Probable few tiny noncalcified gallstones.         Carotid US 1/23/20  Impression   Spectral Doppler sonographic findings consistent with the presence of   an intra-aortic balloon pump. Mild bilateral carotid atherosclerosis. Estimated stenosis in the right and left internal carotid arteries is   0-49% by NASCET criteria. L Heart Cath 1/22/20    ANGIOGRAPHIC FINDINGS:  1. Left main coronary artery arises normally from the left sinus   of Valsalva.  Calcifications noted at the takeoff, suspected   moderately diffusely diseased vessel, small in caliber, 30%   ostial disease, 30% distal disease, proximal aneurysmal   dilatation, unable to engage with a 6 [place] [employer] catheter, had to use   4 [place] catheter   2. The LAD is a midsize vessel extends down to the apex, there is   80% ostial disease today, there is total occlusion in the mid   segment, moderately calcified, the rest of the vessel has luminal   irregularities  3. The left circumflex artery is a midsize vessel that gives off   to midsize OM branches, the distal left circumflex artery has 80%   stenosis at the takeoff of OM branch and then there is another   90% stenosis distally, the first OM branch is totally occluded,   the second OM branch is a large vessel with the inferior   subbranch has 60 to 70% disease in the midsegment  4. The right coronary artery is a midsize vessel, otherwise   normally from the right sinus of Valsalva, there is 80 to 90%   lesion in the midsegment, then the rest of the mid segment of the   RCA is mildly diffusely diseased  5. Left ventriculography showed estimated ejection fraction of   20%, with apical dyskinesis, global wall hypokinesis, plus for   angiographic mitral valve regurgitation with enlarged left atrium  6.  LVEDP of 27 and there was no gradient across aortic valve on   pullback.         COMPLICATIONS:  None.     ESTIMATED TOTAL BLOOD LOSS:   20-30 mL.       MEDICATIONS USED DURING THE PROCEDURE:  We used intraarterial   heparin  for anticoagulation for the diagnostic procedure.    We used intraarterial verapamil to prevent vasospasm.         CONSCIOUS SEDATION:  We used Versed and fentanyl for conscious   sedation. First dose was given at  1534.  Procedure ended at  0404    .     There were  56 minutes of direct face-to-face supervision during   conscious sedation  Administration.       Total fluoroscopy time was  7  minutes.       Total contrast used was  100  mL.       IMPRESSION:  1. Severe multivessel disease  2. Severe cardiomyopathy  3. Severe angiographic mitral valve regurgitation (the position   of the pigtail catheter might have worsening the severity of the   mitral valve regurgitation)  4. Elevated LVEDP  5.  Intra-aortic balloon pump insertion for chest pain not   responsive to medical therapy,       RECOMMENDATIONS:  1.  Continue current treatment  2.    CT surgery consultation for evaluation for possible CABG  3.    Echocardiogram  4.  Patient will be transferred to CVICU for further evaluation   and management    EKG 1/22/20  Component Value Ref Range & Units Status Collected Lab   Ventricular Rate 80  BPM Preliminary 01/22/2020  6:33 PM [facility]   Atrial Rate 80  BPM Preliminary 01/22/2020  6:33 PM [facility]   P-R Interval 144  ms Preliminary 01/22/2020  6:33 PM [facility]   QRS Duration 84  ms Preliminary 01/22/2020  6:33 PM [facility]   Q-T Interval 404  ms Preliminary 01/22/2020  6:33 PM [facility]   QTc Calculation (Bazett) 465  ms Preliminary 01/22/2020  6:33 PM [facility]   P [place] 72  degrees Preliminary 01/22/2020  6:33 PM [facility]   R [place] -9  degrees Preliminary 01/22/2020  6:33 PM [facility]   T [place] -23  degrees Preliminary 01/22/2020  6:33 PM [facility]   Testing Performed By     Lab - Abbreviation Name Director Address Valid Date Range   360-[facility] MUSE Unknown Unknown 04/18/16 0721-Present   Narrative & [General Appearance - Well Nourished] : well nourished Impression     Normal sinus rhythm  Low voltage QRS  Septal infarct , age undetermined  T wave abnormality, consider lateral ischemia  Abnormal ECG  No previous ECGs available         NPO Status: Time of last liquid consumption: 2200                        Time of last solid consumption: 1930pt instructed to be NPO at midnight                        Date of last liquid consumption: 01/23/20                        Date of last solid food consumption: 01/23/20    BMI:   Wt Readings from Last 3 Encounters:   01/23/20 183 lb 6.8 oz (83.2 kg)     Body mass index is 28.73 kg/m². CBC:   Lab Results   Component Value Date    WBC 12.1 01/24/2020    RBC 4.93 01/24/2020    HGB 14.1 01/24/2020    HCT 43.0 01/24/2020    MCV 87.2 01/24/2020    RDW 13.3 01/24/2020     01/24/2020       CMP:   Lab Results   Component Value Date     01/24/2020    K 4.4 01/24/2020     01/24/2020    CO2 23 01/24/2020    BUN 15 01/24/2020    CREATININE 0.8 01/24/2020    GFRAA >60 01/24/2020    LABGLOM >60 01/24/2020    GLUCOSE 130 01/24/2020    PROT 6.0 01/24/2020    CALCIUM 8.5 01/24/2020    BILITOT 0.6 01/24/2020    ALKPHOS 60 01/24/2020    AST 91 01/24/2020    ALT 44 01/24/2020       POC Tests: No results for input(s): POCGLU, POCNA, POCK, POCCL, POCBUN, POCHEMO, POCHCT in the last 72 hours.     Coags:   Lab Results   Component Value Date    PROTIME 12.0 01/23/2020    INR 1.1 01/23/2020    APTT 30.2 01/23/2020       HCG (If Applicable): No results found for: PREGTESTUR, PREGSERUM, HCG, HCGQUANT     ABGs: No results found for: PHART, PO2ART, QDF1GQJ, WPK6KQM, BEART, Z3QIFEJR     Type & Screen (If Applicable):  No results found for: LABABO, [address]    Anesthesia Evaluation  Patient summary reviewed and Nursing notes reviewed no history of anesthetic complications:   Airway: Mallampati: III  TM distance: >3 FB   Neck ROM: full  Mouth opening: > = 3 FB Dental:          Pulmonary: breath sounds clear to [Edema] : no peripheral edema [Exaggerated Use Of Accessory Muscles For Inspiration] : no accessory muscle use [Normal Station and Gait] : the gait and station were normal for the patient's age [] : no rash [No Focal Deficits] : no focal deficits [Oriented To Time, Place, And Person] : oriented to person, place, and time

## 2020-10-20 ENCOUNTER — TELEPHONE (OUTPATIENT)
Dept: CARDIOLOGY CLINIC | Age: 75
End: 2020-10-20

## 2020-10-20 NOTE — TELEPHONE ENCOUNTER
Staci Yeung had Cath  And surgery in Jan 2020, he has appt with you in Nov (rescheduled one for thurs ) He wants you to put in a lab request for any blood work he should have checked since heart surgery in Jan.  Thank you

## 2020-11-25 ENCOUNTER — OFFICE VISIT (OUTPATIENT)
Dept: CARDIOLOGY CLINIC | Age: 75
End: 2020-11-25
Payer: MEDICARE

## 2020-11-25 VITALS
DIASTOLIC BLOOD PRESSURE: 68 MMHG | RESPIRATION RATE: 18 BRPM | BODY MASS INDEX: 25.74 KG/M2 | HEIGHT: 67 IN | HEART RATE: 68 BPM | WEIGHT: 164 LBS | SYSTOLIC BLOOD PRESSURE: 130 MMHG

## 2020-11-25 PROCEDURE — 99214 OFFICE O/P EST MOD 30 MIN: CPT | Performed by: INTERNAL MEDICINE

## 2020-11-25 PROCEDURE — 93000 ELECTROCARDIOGRAM COMPLETE: CPT | Performed by: INTERNAL MEDICINE

## 2020-11-25 PROCEDURE — G8484 FLU IMMUNIZE NO ADMIN: HCPCS | Performed by: INTERNAL MEDICINE

## 2020-11-25 PROCEDURE — G8417 CALC BMI ABV UP PARAM F/U: HCPCS | Performed by: INTERNAL MEDICINE

## 2020-11-25 PROCEDURE — 1123F ACP DISCUSS/DSCN MKR DOCD: CPT | Performed by: INTERNAL MEDICINE

## 2020-11-25 PROCEDURE — G8427 DOCREV CUR MEDS BY ELIG CLIN: HCPCS | Performed by: INTERNAL MEDICINE

## 2020-11-25 PROCEDURE — 1036F TOBACCO NON-USER: CPT | Performed by: INTERNAL MEDICINE

## 2020-11-25 PROCEDURE — 4040F PNEUMOC VAC/ADMIN/RCVD: CPT | Performed by: INTERNAL MEDICINE

## 2020-11-25 RX ORDER — ROSUVASTATIN CALCIUM 10 MG/1
10 TABLET, COATED ORAL DAILY
Qty: 90 TABLET | Refills: 3 | Status: SHIPPED | OUTPATIENT
Start: 2020-11-25

## 2020-11-25 RX ORDER — MULTIVIT WITH MINERALS/LUTEIN
1000 TABLET ORAL DAILY
COMMUNITY

## 2020-11-25 RX ORDER — ROSUVASTATIN CALCIUM 10 MG/1
10 TABLET, COATED ORAL DAILY
Qty: 90 TABLET | Refills: 3 | Status: SHIPPED
Start: 2020-11-25 | End: 2020-11-25 | Stop reason: SDUPTHER

## 2020-11-25 NOTE — PROGRESS NOTES
CHIEF COMPLAINT: CAD-CABGx2. HISTORY OF PRESENT ILLNESS: Patient is a 67 y.o. male seen at the request of Trae Muhammad DO. Follow up post CABG x 2 1/24/2020. No CP or SOB. Past Medical History:   Diagnosis Date    CAD in native artery 1/22/2020    Hernia, umbilical     MI (myocardial infarction) [facility])        Patient Active Problem List   Diagnosis    CAD in native artery    Non-ST elevation MI (NSTEMI) ([facility] [address]    Chest pain    Ischemic cardiomyopathy    Atrial fibrillation with RVR (HCC)       Allergies   Allergen Reactions    Tetanus Toxoids      Flu like symptoms       Current Outpatient Medications   Medication Sig Dispense Refill    Ascorbic Acid (VITAMIN C) 1000 MG tablet Take 1,000 mg by mouth daily      ELDERBERRY PO Take 1 tablet by mouth daily      Lactobacillus (DILIP ASSIST PO) Take 1 tablet by mouth daily      rosuvastatin (CRESTOR) 10 MG tablet Take 1 tablet by mouth daily 90 tablet 3    aspirin 81 MG EC tablet Take 1 tablet by mouth daily 30 tablet 3    vitamin B-12 (CYANOCOBALAMIN) 500 MCG tablet Take 500 mcg by mouth daily      Cholecalciferol (VITAMIN D) 50 MCG (2000 UT) CAPS capsule Take by mouth      atorvastatin (LIPITOR) 80 MG tablet Take 1 tablet by mouth nightly (Patient not taking: Reported on 11/25/2020) 30 tablet 3     No current facility-administered medications for this visit.         Social History     Socioeconomic History    Marital status:      Spouse name: Not on file    Number of children: Not on file    Years of education: Not on file    Highest education level: Not on file   Occupational History    Not on file   Social Needs    Financial resource strain: Not on file    Food insecurity     Worry: Not on file     Inability: Not on file    Transportation needs     Medical: Not on file     Non-medical: Not on file   Tobacco Use    Smoking status: Former Smoker     Last attempt to quit: 1/1/2005     Years since quitting: 15.9   

## (undated) DEVICE — SOLUTION IV IRRIG POUR BRL 0.9% SODIUM CHL 2F7124

## (undated) DEVICE — SOLUTION IV IRRIG WATER 1000ML POUR BRL 2F7114

## (undated) DEVICE — GLOVE SURG SZ 65 THK91MIL LTX FREE SYN POLYISOPRENE

## (undated) DEVICE — PACK OPEN HRT DRP

## (undated) DEVICE — GLOVE SURG SZ 7.5 L11.73IN FNGR THK9.8MIL STRW LTX POLYMER

## (undated) DEVICE — SUMP INTCARD SUCT AD 20FR PERICARD MAYO STYL FLX VERSATILE

## (undated) DEVICE — GLOVE ORANGE PI 7   MSG9070

## (undated) DEVICE — Device

## (undated) DEVICE — SET SURG BASIN OPEN HEART NO  1 REUSABLE

## (undated) DEVICE — Z INACTIVE USE 2662641 SOLUTION IV 1000ML 140MEQ/L SOD 5MEQ/L K 3MEQ/L MG 27MEQ/L

## (undated) DEVICE — PADDLE INTERN DEFIB CHILD

## (undated) DEVICE — GLOVE ORANGE PI 7 1/2   MSG9075

## (undated) DEVICE — Z INACTIVE USE 2660664 SOLUTION IRRIG 3000ML 0.9% SOD CHL USP UROMATIC PLAS CONT

## (undated) DEVICE — DRAIN CHN 19FR L0.25MM DIA6.3MM SIL RND HUBLESS FULL FLUT

## (undated) DEVICE — TUBING PERF PMP L10FT OD0.25IN ID1/16IN MED CLASS VI PRECUT

## (undated) DEVICE — DRAIN SURG SGL COLL PT TB FOR ATS BG OASIS

## (undated) DEVICE — BLADE CLIPPER GEN PURP NS

## (undated) DEVICE — SET CARDIAC I

## (undated) DEVICE — PICO 7 10CM X 30CM: Brand: PICO™ 7

## (undated) DEVICE — Z DISCONTINUED PER MEDLINE USE 2425483 TAPE UMB L30IN DIA1/8IN WHT COT NONABSORBABLE W/O NDL FOR

## (undated) DEVICE — DRAPE THER FLUID WARMING 66X44 IN FLAT SLUSH DBL DISC ORS

## (undated) DEVICE — SS SUTURE, 3 PER SLEEVE: Brand: MYO/WIRE II

## (undated) DEVICE — 6 FOOT DISPOSABLE EXTENSION CABLE WITH SAFE CONNECT / SCREW-DOWN

## (undated) DEVICE — 1.5L THIN WALL CAN: Brand: CRD

## (undated) DEVICE — CATHETER,URETHRAL,REDRUBBER,STERILE,20FR: Brand: MEDLINE

## (undated) DEVICE — AEGIS 1" DISK 4MM HOLE, PEEL OPEN: Brand: MEDLINE

## (undated) DEVICE — Z DUP USE 2257490 ADHESIVE SKIN CLSRE 036ML TPCL 2CTL CNCRLTE HIGH VSCSTY DRMB

## (undated) DEVICE — SOLUTION IV 50ML 0.9% SOD CHL PLAS CONT USP VIAFLX

## (undated) DEVICE — TTL1LYR 16FR10ML 100%SIL TMPST TR: Brand: MEDLINE

## (undated) DEVICE — CARDIAC STRYKER STERNAL SAW

## (undated) DEVICE — ALCOHOL RUBBING ISO 16OZ 70%

## (undated) DEVICE — TOWEL,OR,DSP,ST,BLUE,DLX,4/PK,20PK/CS: Brand: MEDLINE

## (undated) DEVICE — SET SURG BASIN MAYO REUSABLE

## (undated) DEVICE — SET CARDIAC II

## (undated) DEVICE — CONNECTOR PERF W0.25XH3/8IN BASE Y SHP REDUC W/O LUERLOCK

## (undated) DEVICE — TUBING HEMCONC L36IN DIA0.25IN DHF W/ CONN

## (undated) DEVICE — TIP APPL GEL PLT ENDO 5MMX32CM

## (undated) DEVICE — MPS® DELIVERY SET W/ARREST AGENT AND ADDITIVE CASSETTES, HEAT EXCHANGER & 10 FT. DELIVERY TUBING: Brand: MPS

## (undated) DEVICE — PERFUSION PACK CUST OPN HRT

## (undated) DEVICE — VASOVIEW 2 HEMOPRO MIN ORD 5 EA

## (undated) DEVICE — SURGICAL PROCEDURE PACK CRD SEHC

## (undated) DEVICE — TAPE ADH W2INXL10YD WHT PAPR GENTLE BRTH FLX COMFORTABLE

## (undated) DEVICE — CANNULA PERF 7FR L5.5IN AORT ROOT RADPQ STD TIP W/ VENT LN

## (undated) DEVICE — MPS® PRESSURE LINE W/TRANSDUCER: Brand: MPS

## (undated) DEVICE — CONNECTOR PERF W64XH64XL64MM W  LUERLOCK

## (undated) DEVICE — SOLUTION IV 1000ML 0.9% SOD CHL PH 5 INJ USP VIAFLX PLAS

## (undated) DEVICE — CATHETER THOR 32FR L23IN PVC 6 EYELET STR ATRAUM

## (undated) DEVICE — BLOOD TRANSFUSION FILTER: Brand: HAEMONETICS

## (undated) DEVICE — STERILE LATEX POWDER FREE SURGICAL GLOVES WITH HYDROGEL COATING: Brand: PROTEXIS

## (undated) DEVICE — TOWEL,OR,DSP,ST,WHITE,DLX,4/PK,20PK/CS: Brand: MEDLINE

## (undated) DEVICE — CONNECTOR PERF W0.5XH3/8XL3/8IN BASE UNEQUAL Y SHP W/O

## (undated) DEVICE — RETROGRADE CARDIOPLEGIA CATHETER: Brand: EDWARDS LIFESCIENCES RETROGRADE CARDIOPLEGIA CATHETER

## (undated) DEVICE — LABEL MED CARD SURG 4 IN PANEL STRL

## (undated) DEVICE — KIT PLT RATIO DISPNS KT 2IN CANN TIP SPRY TIP DISP MAGELLAN

## (undated) DEVICE — GLOVE SURG SZ 6 THK91MIL LTX FREE SYN POLYISOPRENE ANTI

## (undated) DEVICE — BASIC SINGLE BASIN 1-LF: Brand: MEDLINE INDUSTRIES, INC.

## (undated) DEVICE — TAPE ADH W2INXL10YD PLAS TRNSPAR H2O RESIST HYPOALRG CURAD

## (undated) DEVICE — AGENT HEMSTAT W4XL8IN OXIDIZED REGENERATED CELOS ABSRB

## (undated) DEVICE — CLOTH SURG PREP PREOPERATIVE CHLORHEXIDINE GLUC 2% READYPREP

## (undated) DEVICE — SET CARDIAC VALVE EXTRAS

## (undated) DEVICE — PUMP SUC IRR TBNG L10FT W/ HNDPC ASSEMB STRYKEFLOW 2

## (undated) DEVICE — TUBING, SUCTION, 3/16" X 12', STRAIGHT: Brand: MEDLINE

## (undated) DEVICE — AVID DUAL STAGE VENOUS DRAINAGE CANNULA: Brand: AVID DUAL STAGE VENOUS DRAINAGE CANNULA